# Patient Record
Sex: MALE | Race: WHITE | NOT HISPANIC OR LATINO
[De-identification: names, ages, dates, MRNs, and addresses within clinical notes are randomized per-mention and may not be internally consistent; named-entity substitution may affect disease eponyms.]

---

## 2018-01-04 ENCOUNTER — APPOINTMENT (OUTPATIENT)
Dept: UROLOGY | Facility: CLINIC | Age: 63
End: 2018-01-04

## 2018-01-18 ENCOUNTER — APPOINTMENT (OUTPATIENT)
Dept: UROLOGY | Facility: CLINIC | Age: 63
End: 2018-01-18
Payer: MEDICARE

## 2018-01-18 DIAGNOSIS — I10 ESSENTIAL (PRIMARY) HYPERTENSION: ICD-10-CM

## 2018-01-18 PROCEDURE — 99203 OFFICE O/P NEW LOW 30 MIN: CPT

## 2018-01-22 ENCOUNTER — APPOINTMENT (OUTPATIENT)
Dept: UROLOGY | Facility: CLINIC | Age: 63
End: 2018-01-22
Payer: MEDICARE

## 2018-01-29 ENCOUNTER — LABORATORY RESULT (OUTPATIENT)
Age: 63
End: 2018-01-29

## 2018-01-29 ENCOUNTER — APPOINTMENT (OUTPATIENT)
Dept: UROLOGY | Facility: CLINIC | Age: 63
End: 2018-01-29
Payer: MEDICARE

## 2018-01-29 VITALS
WEIGHT: 166 LBS | RESPIRATION RATE: 18 BRPM | BODY MASS INDEX: 26.06 KG/M2 | HEART RATE: 78 BPM | TEMPERATURE: 98.1 F | DIASTOLIC BLOOD PRESSURE: 86 MMHG | OXYGEN SATURATION: 98 % | SYSTOLIC BLOOD PRESSURE: 150 MMHG | HEIGHT: 67 IN

## 2018-01-29 VITALS — SYSTOLIC BLOOD PRESSURE: 160 MMHG | DIASTOLIC BLOOD PRESSURE: 84 MMHG | RESPIRATION RATE: 18 BRPM | HEART RATE: 88 BPM

## 2018-01-29 PROCEDURE — 55700: CPT

## 2018-01-29 PROCEDURE — 76872 US TRANSRECTAL: CPT

## 2018-01-29 PROCEDURE — 76942 ECHO GUIDE FOR BIOPSY: CPT | Mod: 59

## 2018-01-29 RX ORDER — GENTAMICIN SULFATE 40 MG/ML
40 INJECTION, SOLUTION INTRAMUSCULAR; INTRAVENOUS
Qty: 2 | Refills: 0 | Status: COMPLETED | OUTPATIENT
Start: 2018-01-29 | End: 2018-01-29

## 2018-02-02 ENCOUNTER — APPOINTMENT (OUTPATIENT)
Dept: UROLOGY | Facility: CLINIC | Age: 63
End: 2018-02-02
Payer: MEDICARE

## 2018-02-02 PROCEDURE — 99212 OFFICE O/P EST SF 10 MIN: CPT

## 2018-02-02 RX ORDER — SALINE LAXATIVE 7; 19 G/118ML; G/118ML
7-19 ENEMA RECTAL
Qty: 1 | Refills: 0 | Status: COMPLETED | COMMUNITY
Start: 2018-01-18 | End: 2018-02-02

## 2018-02-02 RX ORDER — OMEPRAZOLE 20 MG/1
20 CAPSULE, DELAYED RELEASE ORAL
Qty: 90 | Refills: 0 | Status: ACTIVE | COMMUNITY
Start: 2018-01-22

## 2018-02-02 RX ORDER — LEVOFLOXACIN 500 MG/1
500 TABLET, FILM COATED ORAL
Qty: 3 | Refills: 0 | Status: COMPLETED | COMMUNITY
Start: 2018-01-18 | End: 2018-02-02

## 2018-02-08 ENCOUNTER — APPOINTMENT (OUTPATIENT)
Dept: NUCLEAR MEDICINE | Facility: CLINIC | Age: 63
End: 2018-02-08
Payer: MEDICARE

## 2018-02-08 ENCOUNTER — APPOINTMENT (OUTPATIENT)
Dept: CT IMAGING | Facility: CLINIC | Age: 63
End: 2018-02-08
Payer: MEDICARE

## 2018-02-08 ENCOUNTER — OUTPATIENT (OUTPATIENT)
Dept: OUTPATIENT SERVICES | Facility: HOSPITAL | Age: 63
LOS: 1 days | End: 2018-02-08

## 2018-02-08 DIAGNOSIS — C61 MALIGNANT NEOPLASM OF PROSTATE: ICD-10-CM

## 2018-02-08 PROCEDURE — 78306 BONE IMAGING WHOLE BODY: CPT | Mod: 26

## 2018-02-08 PROCEDURE — 78999 UNLISTED MISC PX DX NUC MED: CPT | Mod: 26

## 2018-02-08 PROCEDURE — 74178 CT ABD&PLV WO CNTR FLWD CNTR: CPT | Mod: 26

## 2018-02-12 ENCOUNTER — APPOINTMENT (OUTPATIENT)
Dept: UROLOGY | Facility: CLINIC | Age: 63
End: 2018-02-12
Payer: MEDICARE

## 2018-02-12 VITALS
TEMPERATURE: 98.7 F | HEIGHT: 67 IN | WEIGHT: 166 LBS | DIASTOLIC BLOOD PRESSURE: 77 MMHG | SYSTOLIC BLOOD PRESSURE: 117 MMHG | HEART RATE: 70 BPM | OXYGEN SATURATION: 94 % | BODY MASS INDEX: 26.06 KG/M2

## 2018-02-12 DIAGNOSIS — Z77.098 CONTACT WITH AND (SUSPECTED) EXPOSURE TO OTHER HAZARDOUS, CHIEFLY NONMEDICINAL, CHEMICALS: ICD-10-CM

## 2018-02-12 DIAGNOSIS — Z87.891 PERSONAL HISTORY OF NICOTINE DEPENDENCE: ICD-10-CM

## 2018-02-12 DIAGNOSIS — Z83.79 FAMILY HISTORY OF OTHER DISEASES OF THE DIGESTIVE SYSTEM: ICD-10-CM

## 2018-02-12 DIAGNOSIS — Z78.9 OTHER SPECIFIED HEALTH STATUS: ICD-10-CM

## 2018-02-12 DIAGNOSIS — Z82.49 FAMILY HISTORY OF ISCHEMIC HEART DISEASE AND OTHER DISEASES OF THE CIRCULATORY SYSTEM: ICD-10-CM

## 2018-02-12 DIAGNOSIS — Z84.1 FAMILY HISTORY OF DISORDERS OF KIDNEY AND URETER: ICD-10-CM

## 2018-02-12 PROCEDURE — 99214 OFFICE O/P EST MOD 30 MIN: CPT

## 2018-02-16 ENCOUNTER — OUTPATIENT (OUTPATIENT)
Dept: OUTPATIENT SERVICES | Facility: HOSPITAL | Age: 63
LOS: 1 days | Discharge: HOME | End: 2018-02-16

## 2018-02-16 ENCOUNTER — OUTPATIENT (OUTPATIENT)
Dept: OUTPATIENT SERVICES | Facility: HOSPITAL | Age: 63
LOS: 1 days | End: 2018-02-16
Payer: MEDICARE

## 2018-02-16 ENCOUNTER — APPOINTMENT (OUTPATIENT)
Dept: MRI IMAGING | Facility: CLINIC | Age: 63
End: 2018-02-16
Payer: MEDICARE

## 2018-02-16 DIAGNOSIS — C61 MALIGNANT NEOPLASM OF PROSTATE: ICD-10-CM

## 2018-02-16 PROCEDURE — A9585: CPT

## 2018-02-16 PROCEDURE — 72197 MRI PELVIS W/O & W/DYE: CPT

## 2018-02-16 PROCEDURE — 72197 MRI PELVIS W/O & W/DYE: CPT | Mod: 26

## 2018-02-21 ENCOUNTER — APPOINTMENT (OUTPATIENT)
Dept: RADIATION ONCOLOGY | Facility: CLINIC | Age: 63
End: 2018-02-21
Payer: MEDICARE

## 2018-02-21 VITALS
BODY MASS INDEX: 26.68 KG/M2 | DIASTOLIC BLOOD PRESSURE: 89 MMHG | OXYGEN SATURATION: 95 % | WEIGHT: 170 LBS | HEART RATE: 77 BPM | RESPIRATION RATE: 16 BRPM | SYSTOLIC BLOOD PRESSURE: 147 MMHG | TEMPERATURE: 98.4 F | HEIGHT: 67 IN

## 2018-02-21 DIAGNOSIS — R68.89 OTHER GENERAL SYMPTOMS AND SIGNS: ICD-10-CM

## 2018-02-21 PROCEDURE — 99205 OFFICE O/P NEW HI 60 MIN: CPT | Mod: 25

## 2018-02-21 RX ORDER — ENALAPRIL MALEATE 10 MG/1
10 TABLET ORAL
Refills: 0 | Status: ACTIVE | COMMUNITY

## 2018-02-21 RX ORDER — NIFEDIPINE 60 MG/1
60 TABLET, FILM COATED, EXTENDED RELEASE ORAL
Refills: 0 | Status: ACTIVE | COMMUNITY

## 2018-02-28 ENCOUNTER — OUTPATIENT (OUTPATIENT)
Dept: OUTPATIENT SERVICES | Facility: HOSPITAL | Age: 63
LOS: 1 days | Discharge: HOME | End: 2018-02-28
Payer: MEDICARE

## 2018-02-28 VITALS
WEIGHT: 166.01 LBS | DIASTOLIC BLOOD PRESSURE: 94 MMHG | TEMPERATURE: 98 F | OXYGEN SATURATION: 98 % | SYSTOLIC BLOOD PRESSURE: 140 MMHG | HEIGHT: 67 IN | HEART RATE: 59 BPM | RESPIRATION RATE: 16 BRPM

## 2018-02-28 DIAGNOSIS — F41.9 ANXIETY DISORDER, UNSPECIFIED: ICD-10-CM

## 2018-02-28 DIAGNOSIS — G62.9 POLYNEUROPATHY, UNSPECIFIED: ICD-10-CM

## 2018-02-28 DIAGNOSIS — Z96.7 PRESENCE OF OTHER BONE AND TENDON IMPLANTS: Chronic | ICD-10-CM

## 2018-02-28 DIAGNOSIS — C61 MALIGNANT NEOPLASM OF PROSTATE: ICD-10-CM

## 2018-02-28 DIAGNOSIS — E78.5 HYPERLIPIDEMIA, UNSPECIFIED: ICD-10-CM

## 2018-02-28 DIAGNOSIS — Z98.890 OTHER SPECIFIED POSTPROCEDURAL STATES: Chronic | ICD-10-CM

## 2018-02-28 DIAGNOSIS — Z01.818 ENCOUNTER FOR OTHER PREPROCEDURAL EXAMINATION: ICD-10-CM

## 2018-02-28 DIAGNOSIS — N32.89 OTHER SPECIFIED DISORDERS OF BLADDER: ICD-10-CM

## 2018-02-28 DIAGNOSIS — I10 ESSENTIAL (PRIMARY) HYPERTENSION: ICD-10-CM

## 2018-02-28 DIAGNOSIS — K66.0 PERITONEAL ADHESIONS (POSTPROCEDURAL) (POSTINFECTION): ICD-10-CM

## 2018-02-28 DIAGNOSIS — K21.9 GASTRO-ESOPHAGEAL REFLUX DISEASE WITHOUT ESOPHAGITIS: ICD-10-CM

## 2018-02-28 DIAGNOSIS — G89.29 OTHER CHRONIC PAIN: ICD-10-CM

## 2018-02-28 LAB
ABO RH CONFIRMATION: SIGNIFICANT CHANGE UP
ANION GAP SERPL CALC-SCNC: 6 MMOL/L — SIGNIFICANT CHANGE UP (ref 5–17)
APPEARANCE UR: CLEAR — SIGNIFICANT CHANGE UP
APTT BLD: 28.5 SEC — SIGNIFICANT CHANGE UP (ref 27.5–37.4)
BASOPHILS # BLD AUTO: 0.1 K/UL — SIGNIFICANT CHANGE UP (ref 0–0.2)
BASOPHILS NFR BLD AUTO: 1.1 % — SIGNIFICANT CHANGE UP (ref 0–2)
BILIRUB UR-MCNC: NEGATIVE — SIGNIFICANT CHANGE UP
BLD GP AB SCN SERPL QL: SIGNIFICANT CHANGE UP
BUN SERPL-MCNC: 14 MG/DL — SIGNIFICANT CHANGE UP (ref 7–23)
CALCIUM SERPL-MCNC: 9.1 MG/DL — SIGNIFICANT CHANGE UP (ref 8.5–10.1)
CHLORIDE SERPL-SCNC: 102 MMOL/L — SIGNIFICANT CHANGE UP (ref 96–108)
CO2 SERPL-SCNC: 29 MMOL/L — SIGNIFICANT CHANGE UP (ref 22–31)
COLOR SPEC: YELLOW — SIGNIFICANT CHANGE UP
CREAT SERPL-MCNC: 0.95 MG/DL — SIGNIFICANT CHANGE UP (ref 0.5–1.3)
DIFF PNL FLD: NEGATIVE — SIGNIFICANT CHANGE UP
EOSINOPHIL # BLD AUTO: 0.1 K/UL — SIGNIFICANT CHANGE UP (ref 0–0.5)
EOSINOPHIL NFR BLD AUTO: 2.3 % — SIGNIFICANT CHANGE UP (ref 0–6)
GLUCOSE SERPL-MCNC: 112 MG/DL — HIGH (ref 70–99)
GLUCOSE UR QL: NEGATIVE MG/DL — SIGNIFICANT CHANGE UP
HCT VFR BLD CALC: 44.7 % — SIGNIFICANT CHANGE UP (ref 39–50)
HGB BLD-MCNC: 15.9 G/DL — SIGNIFICANT CHANGE UP (ref 13–17)
INR BLD: 1.01 RATIO — SIGNIFICANT CHANGE UP (ref 0.88–1.16)
KETONES UR-MCNC: NEGATIVE — SIGNIFICANT CHANGE UP
LEUKOCYTE ESTERASE UR-ACNC: NEGATIVE — SIGNIFICANT CHANGE UP
LYMPHOCYTES # BLD AUTO: 1.3 K/UL — SIGNIFICANT CHANGE UP (ref 1–3.3)
LYMPHOCYTES # BLD AUTO: 21.6 % — SIGNIFICANT CHANGE UP (ref 13–44)
MCHC RBC-ENTMCNC: 31.4 PG — SIGNIFICANT CHANGE UP (ref 27–34)
MCHC RBC-ENTMCNC: 35.6 GM/DL — SIGNIFICANT CHANGE UP (ref 32–36)
MCV RBC AUTO: 88.1 FL — SIGNIFICANT CHANGE UP (ref 80–100)
MONOCYTES # BLD AUTO: 0.5 K/UL — SIGNIFICANT CHANGE UP (ref 0–0.9)
MONOCYTES NFR BLD AUTO: 8.5 % — SIGNIFICANT CHANGE UP (ref 2–14)
NEUTROPHILS # BLD AUTO: 3.9 K/UL — SIGNIFICANT CHANGE UP (ref 1.8–7.4)
NEUTROPHILS NFR BLD AUTO: 66.4 % — SIGNIFICANT CHANGE UP (ref 43–77)
NITRITE UR-MCNC: NEGATIVE — SIGNIFICANT CHANGE UP
PH UR: 7 — SIGNIFICANT CHANGE UP (ref 5–8)
PLATELET # BLD AUTO: 236 K/UL — SIGNIFICANT CHANGE UP (ref 150–400)
POTASSIUM SERPL-MCNC: 4.4 MMOL/L — SIGNIFICANT CHANGE UP (ref 3.5–5.3)
POTASSIUM SERPL-SCNC: 4.4 MMOL/L — SIGNIFICANT CHANGE UP (ref 3.5–5.3)
PROT UR-MCNC: NEGATIVE MG/DL — SIGNIFICANT CHANGE UP
PROTHROM AB SERPL-ACNC: 10.9 SEC — SIGNIFICANT CHANGE UP (ref 9.8–12.7)
RBC # BLD: 5.07 M/UL — SIGNIFICANT CHANGE UP (ref 4.2–5.8)
RBC # FLD: 11.1 % — SIGNIFICANT CHANGE UP (ref 10.3–14.5)
SODIUM SERPL-SCNC: 137 MMOL/L — SIGNIFICANT CHANGE UP (ref 135–145)
SP GR SPEC: 1 — LOW (ref 1.01–1.02)
TYPE + AB SCN PNL BLD: SIGNIFICANT CHANGE UP
UROBILINOGEN FLD QL: NEGATIVE MG/DL — SIGNIFICANT CHANGE UP
WBC # BLD: 5.8 K/UL — SIGNIFICANT CHANGE UP (ref 3.8–10.5)
WBC # FLD AUTO: 5.8 K/UL — SIGNIFICANT CHANGE UP (ref 3.8–10.5)

## 2018-02-28 PROCEDURE — 93010 ELECTROCARDIOGRAM REPORT: CPT

## 2018-02-28 NOTE — H&P PST ADULT - HISTORY OF PRESENT ILLNESS
62 year old male PMH of HTN, HLD, neuropathy right hand, anxiety, GERD diagnosed with prostate cancer presents to PST for robotic radical prostatectomy

## 2018-02-28 NOTE — H&P PST ADULT - PSH
H/O elbow surgery  2005 with nerve damage post surgery according to pt  History of arthroscopy of right shoulder    History of back surgery  laminectomy 2000  S/P ORIF (open reduction internal fixation) fracture  right heel

## 2018-02-28 NOTE — H&P PST ADULT - PMH
Anxiety    GERD (gastroesophageal reflux disease)    Hypertension    Neuropathy    Prostate cancer    Skin cancer

## 2018-03-01 ENCOUNTER — CLINICAL ADVICE (OUTPATIENT)
Age: 63
End: 2018-03-01

## 2018-03-01 LAB
CULTURE RESULTS: NO GROWTH — SIGNIFICANT CHANGE UP
SPECIMEN SOURCE: SIGNIFICANT CHANGE UP

## 2018-03-05 ENCOUNTER — OUTPATIENT (OUTPATIENT)
Dept: OUTPATIENT SERVICES | Facility: HOSPITAL | Age: 63
LOS: 1 days | Discharge: HOME | End: 2018-03-05
Payer: MEDICARE

## 2018-03-05 ENCOUNTER — RESULT REVIEW (OUTPATIENT)
Age: 63
End: 2018-03-05

## 2018-03-05 DIAGNOSIS — Z98.890 OTHER SPECIFIED POSTPROCEDURAL STATES: Chronic | ICD-10-CM

## 2018-03-05 DIAGNOSIS — Z96.7 PRESENCE OF OTHER BONE AND TENDON IMPLANTS: Chronic | ICD-10-CM

## 2018-03-05 DIAGNOSIS — C61 MALIGNANT NEOPLASM OF PROSTATE: ICD-10-CM

## 2018-03-05 LAB — SURGICAL PATHOLOGY FINAL REPORT - CH: SIGNIFICANT CHANGE UP

## 2018-03-05 PROCEDURE — 88321 CONSLTJ&REPRT SLD PREP ELSWR: CPT

## 2018-03-05 RX ORDER — FAMOTIDINE 10 MG/ML
20 INJECTION INTRAVENOUS ONCE
Qty: 0 | Refills: 0 | Status: COMPLETED | OUTPATIENT
Start: 2018-03-06 | End: 2018-03-06

## 2018-03-05 RX ORDER — OXYCODONE HYDROCHLORIDE 5 MG/1
10 TABLET ORAL ONCE
Qty: 0 | Refills: 0 | Status: DISCONTINUED | OUTPATIENT
Start: 2018-03-06 | End: 2018-03-06

## 2018-03-05 RX ORDER — ACETAMINOPHEN 500 MG
975 TABLET ORAL ONCE
Qty: 0 | Refills: 0 | Status: COMPLETED | OUTPATIENT
Start: 2018-03-06 | End: 2018-03-06

## 2018-03-05 RX ORDER — OXYCODONE HYDROCHLORIDE 5 MG/1
5 TABLET ORAL EVERY 4 HOURS
Qty: 0 | Refills: 0 | Status: DISCONTINUED | OUTPATIENT
Start: 2018-03-06 | End: 2018-03-06

## 2018-03-05 RX ORDER — SODIUM CHLORIDE 9 MG/ML
3 INJECTION INTRAMUSCULAR; INTRAVENOUS; SUBCUTANEOUS EVERY 8 HOURS
Qty: 0 | Refills: 0 | Status: DISCONTINUED | OUTPATIENT
Start: 2018-03-06 | End: 2018-03-08

## 2018-03-06 ENCOUNTER — INPATIENT (INPATIENT)
Facility: HOSPITAL | Age: 63
LOS: 1 days | Discharge: HOME | End: 2018-03-08
Attending: UROLOGY | Admitting: UROLOGY
Payer: MEDICARE

## 2018-03-06 ENCOUNTER — OTHER (OUTPATIENT)
Age: 63
End: 2018-03-06

## 2018-03-06 ENCOUNTER — RESULT REVIEW (OUTPATIENT)
Age: 63
End: 2018-03-06

## 2018-03-06 ENCOUNTER — APPOINTMENT (OUTPATIENT)
Dept: UROLOGY | Facility: HOSPITAL | Age: 63
End: 2018-03-06

## 2018-03-06 VITALS
HEART RATE: 63 BPM | TEMPERATURE: 98 F | HEIGHT: 67 IN | RESPIRATION RATE: 16 BRPM | SYSTOLIC BLOOD PRESSURE: 137 MMHG | DIASTOLIC BLOOD PRESSURE: 85 MMHG | WEIGHT: 169.98 LBS | OXYGEN SATURATION: 98 %

## 2018-03-06 DIAGNOSIS — Z98.890 OTHER SPECIFIED POSTPROCEDURAL STATES: Chronic | ICD-10-CM

## 2018-03-06 DIAGNOSIS — Z96.7 PRESENCE OF OTHER BONE AND TENDON IMPLANTS: Chronic | ICD-10-CM

## 2018-03-06 LAB
ANION GAP SERPL CALC-SCNC: 5 MMOL/L — SIGNIFICANT CHANGE UP (ref 5–17)
BASOPHILS # BLD AUTO: 0 K/UL — SIGNIFICANT CHANGE UP (ref 0–0.2)
BASOPHILS NFR BLD AUTO: 0.2 % — SIGNIFICANT CHANGE UP (ref 0–2)
BUN SERPL-MCNC: 15 MG/DL — SIGNIFICANT CHANGE UP (ref 7–23)
CALCIUM SERPL-MCNC: 8.4 MG/DL — LOW (ref 8.5–10.1)
CHLORIDE SERPL-SCNC: 105 MMOL/L — SIGNIFICANT CHANGE UP (ref 96–108)
CO2 SERPL-SCNC: 28 MMOL/L — SIGNIFICANT CHANGE UP (ref 22–31)
CREAT SERPL-MCNC: 1.13 MG/DL — SIGNIFICANT CHANGE UP (ref 0.5–1.3)
EOSINOPHIL # BLD AUTO: 0 K/UL — SIGNIFICANT CHANGE UP (ref 0–0.5)
EOSINOPHIL NFR BLD AUTO: 0.1 % — SIGNIFICANT CHANGE UP (ref 0–6)
GLUCOSE SERPL-MCNC: 150 MG/DL — HIGH (ref 70–99)
HCT VFR BLD CALC: 41.6 % — SIGNIFICANT CHANGE UP (ref 39–50)
HGB BLD-MCNC: 14.6 G/DL — SIGNIFICANT CHANGE UP (ref 13–17)
LYMPHOCYTES # BLD AUTO: 0.7 K/UL — LOW (ref 1–3.3)
LYMPHOCYTES # BLD AUTO: 5.1 % — LOW (ref 13–44)
MCHC RBC-ENTMCNC: 31.1 PG — SIGNIFICANT CHANGE UP (ref 27–34)
MCHC RBC-ENTMCNC: 35.2 GM/DL — SIGNIFICANT CHANGE UP (ref 32–36)
MCV RBC AUTO: 88.4 FL — SIGNIFICANT CHANGE UP (ref 80–100)
MONOCYTES # BLD AUTO: 0.2 K/UL — SIGNIFICANT CHANGE UP (ref 0–0.9)
MONOCYTES NFR BLD AUTO: 1.4 % — LOW (ref 2–14)
NEUTROPHILS # BLD AUTO: 12.2 K/UL — HIGH (ref 1.8–7.4)
NEUTROPHILS NFR BLD AUTO: 93.2 % — HIGH (ref 43–77)
PLATELET # BLD AUTO: 239 K/UL — SIGNIFICANT CHANGE UP (ref 150–400)
POTASSIUM SERPL-MCNC: 4 MMOL/L — SIGNIFICANT CHANGE UP (ref 3.5–5.3)
POTASSIUM SERPL-SCNC: 4 MMOL/L — SIGNIFICANT CHANGE UP (ref 3.5–5.3)
RBC # BLD: 4.7 M/UL — SIGNIFICANT CHANGE UP (ref 4.2–5.8)
RBC # FLD: 10.6 % — SIGNIFICANT CHANGE UP (ref 10.3–14.5)
SODIUM SERPL-SCNC: 138 MMOL/L — SIGNIFICANT CHANGE UP (ref 135–145)
WBC # BLD: 13.1 K/UL — HIGH (ref 3.8–10.5)
WBC # FLD AUTO: 13.1 K/UL — HIGH (ref 3.8–10.5)

## 2018-03-06 PROCEDURE — 55866 LAPS SURG PRST8ECT RPBIC RAD: CPT

## 2018-03-06 PROCEDURE — 88307 TISSUE EXAM BY PATHOLOGIST: CPT | Mod: 26

## 2018-03-06 PROCEDURE — 38571 LAPAROSCOPY LYMPHADENECTOMY: CPT

## 2018-03-06 PROCEDURE — 88309 TISSUE EXAM BY PATHOLOGIST: CPT | Mod: 26

## 2018-03-06 RX ORDER — HYDROMORPHONE HYDROCHLORIDE 2 MG/ML
0.5 INJECTION INTRAMUSCULAR; INTRAVENOUS; SUBCUTANEOUS
Qty: 0 | Refills: 0 | Status: DISCONTINUED | OUTPATIENT
Start: 2018-03-06 | End: 2018-03-06

## 2018-03-06 RX ORDER — NIFEDIPINE 30 MG
60 TABLET, EXTENDED RELEASE 24 HR ORAL DAILY
Qty: 0 | Refills: 0 | Status: DISCONTINUED | OUTPATIENT
Start: 2018-03-06 | End: 2018-03-08

## 2018-03-06 RX ORDER — NIFEDIPINE 30 MG
1 TABLET, EXTENDED RELEASE 24 HR ORAL
Qty: 0 | Refills: 0 | COMMUNITY

## 2018-03-06 RX ORDER — SODIUM CHLORIDE 9 MG/ML
1000 INJECTION INTRAMUSCULAR; INTRAVENOUS; SUBCUTANEOUS
Qty: 0 | Refills: 0 | Status: DISCONTINUED | OUTPATIENT
Start: 2018-03-06 | End: 2018-03-07

## 2018-03-06 RX ORDER — HEPARIN SODIUM 5000 [USP'U]/ML
5000 INJECTION INTRAVENOUS; SUBCUTANEOUS EVERY 8 HOURS
Qty: 0 | Refills: 0 | Status: DISCONTINUED | OUTPATIENT
Start: 2018-03-06 | End: 2018-03-08

## 2018-03-06 RX ORDER — ONDANSETRON 8 MG/1
4 TABLET, FILM COATED ORAL EVERY 6 HOURS
Qty: 0 | Refills: 0 | Status: DISCONTINUED | OUTPATIENT
Start: 2018-03-06 | End: 2018-03-08

## 2018-03-06 RX ORDER — HYDROMORPHONE HYDROCHLORIDE 2 MG/ML
0.5 INJECTION INTRAMUSCULAR; INTRAVENOUS; SUBCUTANEOUS
Qty: 0 | Refills: 0 | Status: DISCONTINUED | OUTPATIENT
Start: 2018-03-06 | End: 2018-03-08

## 2018-03-06 RX ORDER — HYDROMORPHONE HYDROCHLORIDE 2 MG/ML
1 INJECTION INTRAMUSCULAR; INTRAVENOUS; SUBCUTANEOUS EVERY 4 HOURS
Qty: 0 | Refills: 0 | Status: DISCONTINUED | OUTPATIENT
Start: 2018-03-06 | End: 2018-03-07

## 2018-03-06 RX ORDER — OMEPRAZOLE 10 MG/1
1 CAPSULE, DELAYED RELEASE ORAL
Qty: 0 | Refills: 0 | COMMUNITY

## 2018-03-06 RX ORDER — OXYCODONE HYDROCHLORIDE 5 MG/1
10 TABLET ORAL EVERY 6 HOURS
Qty: 0 | Refills: 0 | Status: DISCONTINUED | OUTPATIENT
Start: 2018-03-06 | End: 2018-03-08

## 2018-03-06 RX ORDER — CEFAZOLIN SODIUM 1 G
2000 VIAL (EA) INJECTION EVERY 8 HOURS
Qty: 0 | Refills: 0 | Status: COMPLETED | OUTPATIENT
Start: 2018-03-06 | End: 2018-03-07

## 2018-03-06 RX ORDER — ONDANSETRON 8 MG/1
4 TABLET, FILM COATED ORAL ONCE
Qty: 0 | Refills: 0 | Status: DISCONTINUED | OUTPATIENT
Start: 2018-03-06 | End: 2018-03-06

## 2018-03-06 RX ORDER — SODIUM CHLORIDE 9 MG/ML
1000 INJECTION, SOLUTION INTRAVENOUS
Qty: 0 | Refills: 0 | Status: DISCONTINUED | OUTPATIENT
Start: 2018-03-06 | End: 2018-03-06

## 2018-03-06 RX ORDER — PANTOPRAZOLE SODIUM 20 MG/1
40 TABLET, DELAYED RELEASE ORAL
Qty: 0 | Refills: 0 | Status: DISCONTINUED | OUTPATIENT
Start: 2018-03-06 | End: 2018-03-08

## 2018-03-06 RX ORDER — OXYCODONE HYDROCHLORIDE 5 MG/1
5 TABLET ORAL ONCE
Qty: 0 | Refills: 0 | Status: DISCONTINUED | OUTPATIENT
Start: 2018-03-06 | End: 2018-03-06

## 2018-03-06 RX ORDER — GABAPENTIN 400 MG/1
3 CAPSULE ORAL
Qty: 0 | Refills: 0 | COMMUNITY

## 2018-03-06 RX ORDER — OXYCODONE HYDROCHLORIDE 5 MG/1
1 TABLET ORAL
Qty: 0 | Refills: 0 | COMMUNITY

## 2018-03-06 RX ORDER — ALPRAZOLAM 0.25 MG
1 TABLET ORAL AT BEDTIME
Qty: 0 | Refills: 0 | Status: DISCONTINUED | OUTPATIENT
Start: 2018-03-06 | End: 2018-03-08

## 2018-03-06 RX ORDER — GABAPENTIN 400 MG/1
300 CAPSULE ORAL DAILY
Qty: 0 | Refills: 0 | Status: DISCONTINUED | OUTPATIENT
Start: 2018-03-06 | End: 2018-03-08

## 2018-03-06 RX ORDER — FENTANYL CITRATE 50 UG/ML
50 INJECTION INTRAVENOUS
Qty: 0 | Refills: 0 | Status: DISCONTINUED | OUTPATIENT
Start: 2018-03-06 | End: 2018-03-06

## 2018-03-06 RX ADMIN — FAMOTIDINE 20 MILLIGRAM(S): 10 INJECTION INTRAVENOUS at 06:53

## 2018-03-06 RX ADMIN — GABAPENTIN 300 MILLIGRAM(S): 400 CAPSULE ORAL at 16:10

## 2018-03-06 RX ADMIN — HYDROMORPHONE HYDROCHLORIDE 0.5 MILLIGRAM(S): 2 INJECTION INTRAMUSCULAR; INTRAVENOUS; SUBCUTANEOUS at 11:46

## 2018-03-06 RX ADMIN — OXYCODONE HYDROCHLORIDE 10 MILLIGRAM(S): 5 TABLET ORAL at 16:13

## 2018-03-06 RX ADMIN — HYDROMORPHONE HYDROCHLORIDE 0.5 MILLIGRAM(S): 2 INJECTION INTRAMUSCULAR; INTRAVENOUS; SUBCUTANEOUS at 12:30

## 2018-03-06 RX ADMIN — HYDROMORPHONE HYDROCHLORIDE 0.5 MILLIGRAM(S): 2 INJECTION INTRAMUSCULAR; INTRAVENOUS; SUBCUTANEOUS at 12:31

## 2018-03-06 RX ADMIN — Medication 975 MILLIGRAM(S): at 06:53

## 2018-03-06 RX ADMIN — HYDROMORPHONE HYDROCHLORIDE 0.5 MILLIGRAM(S): 2 INJECTION INTRAMUSCULAR; INTRAVENOUS; SUBCUTANEOUS at 12:23

## 2018-03-06 RX ADMIN — Medication 60 MILLIGRAM(S): at 17:29

## 2018-03-06 RX ADMIN — SODIUM CHLORIDE 125 MILLILITER(S): 9 INJECTION INTRAMUSCULAR; INTRAVENOUS; SUBCUTANEOUS at 13:21

## 2018-03-06 RX ADMIN — HEPARIN SODIUM 5000 UNIT(S): 5000 INJECTION INTRAVENOUS; SUBCUTANEOUS at 22:13

## 2018-03-06 RX ADMIN — HYDROMORPHONE HYDROCHLORIDE 0.5 MILLIGRAM(S): 2 INJECTION INTRAMUSCULAR; INTRAVENOUS; SUBCUTANEOUS at 11:56

## 2018-03-06 RX ADMIN — SODIUM CHLORIDE 125 MILLILITER(S): 9 INJECTION INTRAMUSCULAR; INTRAVENOUS; SUBCUTANEOUS at 20:02

## 2018-03-06 RX ADMIN — Medication 10 MILLIGRAM(S): at 17:29

## 2018-03-06 RX ADMIN — OXYCODONE HYDROCHLORIDE 10 MILLIGRAM(S): 5 TABLET ORAL at 06:53

## 2018-03-06 RX ADMIN — HYDROMORPHONE HYDROCHLORIDE 1 MILLIGRAM(S): 2 INJECTION INTRAMUSCULAR; INTRAVENOUS; SUBCUTANEOUS at 20:15

## 2018-03-06 RX ADMIN — OXYCODONE HYDROCHLORIDE 10 MILLIGRAM(S): 5 TABLET ORAL at 17:13

## 2018-03-06 RX ADMIN — HYDROMORPHONE HYDROCHLORIDE 0.5 MILLIGRAM(S): 2 INJECTION INTRAMUSCULAR; INTRAVENOUS; SUBCUTANEOUS at 11:36

## 2018-03-06 RX ADMIN — HYDROMORPHONE HYDROCHLORIDE 0.5 MILLIGRAM(S): 2 INJECTION INTRAMUSCULAR; INTRAVENOUS; SUBCUTANEOUS at 13:05

## 2018-03-06 RX ADMIN — OXYCODONE HYDROCHLORIDE 5 MILLIGRAM(S): 5 TABLET ORAL at 12:07

## 2018-03-06 RX ADMIN — Medication 100 MILLIGRAM(S): at 21:13

## 2018-03-06 RX ADMIN — HYDROMORPHONE HYDROCHLORIDE 1 MILLIGRAM(S): 2 INJECTION INTRAMUSCULAR; INTRAVENOUS; SUBCUTANEOUS at 20:00

## 2018-03-06 RX ADMIN — HYDROMORPHONE HYDROCHLORIDE 0.5 MILLIGRAM(S): 2 INJECTION INTRAMUSCULAR; INTRAVENOUS; SUBCUTANEOUS at 11:26

## 2018-03-06 RX ADMIN — OXYCODONE HYDROCHLORIDE 5 MILLIGRAM(S): 5 TABLET ORAL at 11:53

## 2018-03-06 RX ADMIN — SODIUM CHLORIDE 100 MILLILITER(S): 9 INJECTION, SOLUTION INTRAVENOUS at 11:29

## 2018-03-06 RX ADMIN — HYDROMORPHONE HYDROCHLORIDE 0.5 MILLIGRAM(S): 2 INJECTION INTRAMUSCULAR; INTRAVENOUS; SUBCUTANEOUS at 12:10

## 2018-03-06 RX ADMIN — HYDROMORPHONE HYDROCHLORIDE 0.5 MILLIGRAM(S): 2 INJECTION INTRAMUSCULAR; INTRAVENOUS; SUBCUTANEOUS at 12:52

## 2018-03-06 RX ADMIN — SODIUM CHLORIDE 3 MILLILITER(S): 9 INJECTION INTRAMUSCULAR; INTRAVENOUS; SUBCUTANEOUS at 22:16

## 2018-03-06 NOTE — BRIEF OPERATIVE NOTE - PROCEDURE
<<-----Click on this checkbox to enter Procedure Radical prostatectomy  03/06/2018  Robotic assisted laparoscopic radical prostatectomy with b/l inguinal lymph node dissection  Active  [email or web address]

## 2018-03-06 NOTE — PROGRESS NOTE ADULT - SUBJECTIVE AND OBJECTIVE BOX
Subjective: 62y y/o M seen at bedside for post-op check. Reports moderate lower abdominal pain (just received oxycodone IR) but otherwise no complaints. (+) PO intake fluids, dinner tray at bedside (-) flatus/BM (-) OOB. Denies N/V/CP/SOB/F/C.     STATUS POST:  robot assisted radical prostatectomy with b/l inguinal lymph node dissection    POST OPERATIVE DAY #: 0     MALIGNANT NEOPLASM OF PROSTATE  No pertinent family history in first degree relatives  Handoff  MEWS Score  Skin cancer  Anxiety  Neuropathy  GERD (gastroesophageal reflux disease)  Hypertension  Prostate cancer  Prostate cancer  Prostate cancer  Radical prostatectomy  History of arthroscopy of right shoulder  History of back surgery  S/P ORIF (open reduction internal fixation) fracture  H/O elbow surgery      MEDICATIONS  (STANDING):  enalapril 10 milliGRAM(s) Oral daily  gabapentin 300 milliGRAM(s) Oral daily  heparin  Injectable 5000 Unit(s) SubCutaneous every 8 hours  NIFEdipine XL 60 milliGRAM(s) Oral daily  pantoprazole    Tablet 40 milliGRAM(s) Oral before breakfast  sodium chloride 0.9%. 1000 milliLiter(s) (125 mL/Hr) IV Continuous <Continuous>    MEDICATIONS  (PRN):  ALPRAZolam 1 milliGRAM(s) Oral at bedtime PRN for anxiety  HYDROmorphone  Injectable 1 milliGRAM(s) IV Push every 4 hours PRN Severe Pain  HYDROmorphone  Injectable 0.5 milliGRAM(s) IV Push every 10 minutes PRN Severe Pain (7 - 10)  ondansetron Injectable 4 milliGRAM(s) IV Push every 6 hours PRN Nausea  oxyCODONE    IR 10 milliGRAM(s) Oral every 6 hours PRN Moderate Pain      Vital Signs Last 24 Hrs  T(C): 36.5 (06 Mar 2018 13:33), Max: 36.5 (06 Mar 2018 13:33)  T(F): 97.7 (06 Mar 2018 13:33), Max: 97.7 (06 Mar 2018 13:33)  HR: 73 (06 Mar 2018 13:33) (60 - 88)  BP: 153/88 (06 Mar 2018 13:33) (136/68 - 166/85)  BP(mean): --  RR: 18 (06 Mar 2018 13:33) (12 - 18)  SpO2: 97% (06 Mar 2018 13:33) (97% - 100%)    Physical Exam:    Constitutional: NAD  HEENT: PERRL, EOMI, mucous membranes moist  Neck: No JVD  Respiratory: CTAB, no r/r/w, no accessory muscle use  Cardiovascular: RRR, S1, S2, distal pulses intact in all four extremities  Gastrointestinal: multiple abdominal incisions c/d/i with dermabond without erythema/ecchymosis/swelling/drainage, NAREN dressing c/d/i, NAREN with sanguinous drainage, BS+ hypoactive in all four quadrants, ND/grossly NT, soft, no rigidity/guarding/rebound tenderness, cunningham in place with clear yellow drainage (bloody drainage in reservoir)  Extremities: no pedal edema/calf tenderness/swelling b/l, venodynes in place  Neurological: A&O x 3; without gross deficit  Musculoskeletal: motor and sensory grossly intact in all four extremities    LABS:                        14.6   13.1  )-----------( 239      ( 06 Mar 2018 12:27 )             41.6     03-06    138  |  105  |  15  ----------------------------<  150<H>  4.0   |  28  |  1.13    Ca    8.4<L>      06 Mar 2018 12:27    I&O's Detail    06 Mar 2018 07:01  -  06 Mar 2018 16:40  --------------------------------------------------------  IN:    Other: 1880 mL  Total IN: 1880 mL    OUT:    Bulb: 80 mL    Indwelling Catheter - Urethral: 40 mL + 200 mL in bag  Total OUT: 320 mL    Total NET: 1560 mL    A/P: 62y y/o Male POD#0 s/p robot assisted radical prostatectomy with b/l inguinal lymph node dissection today. Pt doing well post-operatively  - VSS, CBC/BMP changes expected post-op, H/H stable, adequate UO, expected NAREN output  - Pt to stay overnight, repeat AM labs, and AM follow-up to evaluate for possible discharge  - Pt OOB to chair this PM, OOB in AM  - Continue pain management, IVF, advance diet as tolerated, DVT ppx with Heparin/SCDs, IS, Ancef x 2 doses post-op, NAREN drain to self-suction, cunningham to drainage, I&Os q6h

## 2018-03-07 LAB
ANION GAP SERPL CALC-SCNC: 7 MMOL/L — SIGNIFICANT CHANGE UP (ref 5–17)
BASOPHILS # BLD AUTO: 0.03 K/UL — SIGNIFICANT CHANGE UP (ref 0–0.2)
BASOPHILS NFR BLD AUTO: 0.3 % — SIGNIFICANT CHANGE UP (ref 0–2)
BUN SERPL-MCNC: 11 MG/DL — SIGNIFICANT CHANGE UP (ref 7–23)
CALCIUM SERPL-MCNC: 8.2 MG/DL — LOW (ref 8.5–10.1)
CHLORIDE SERPL-SCNC: 106 MMOL/L — SIGNIFICANT CHANGE UP (ref 96–108)
CO2 SERPL-SCNC: 26 MMOL/L — SIGNIFICANT CHANGE UP (ref 22–31)
CREAT SERPL-MCNC: 1.06 MG/DL — SIGNIFICANT CHANGE UP (ref 0.5–1.3)
EOSINOPHIL # BLD AUTO: 0.02 K/UL — SIGNIFICANT CHANGE UP (ref 0–0.5)
EOSINOPHIL NFR BLD AUTO: 0.2 % — SIGNIFICANT CHANGE UP (ref 0–6)
GLUCOSE SERPL-MCNC: 121 MG/DL — HIGH (ref 70–99)
HCT VFR BLD CALC: 38.4 % — LOW (ref 39–50)
HGB BLD-MCNC: 13.8 G/DL — SIGNIFICANT CHANGE UP (ref 13–17)
IMM GRANULOCYTES NFR BLD AUTO: 0.4 % — SIGNIFICANT CHANGE UP (ref 0–1.5)
LYMPHOCYTES # BLD AUTO: 1.63 K/UL — SIGNIFICANT CHANGE UP (ref 1–3.3)
LYMPHOCYTES # BLD AUTO: 14.8 % — SIGNIFICANT CHANGE UP (ref 13–44)
MCHC RBC-ENTMCNC: 31.6 PG — SIGNIFICANT CHANGE UP (ref 27–34)
MCHC RBC-ENTMCNC: 35.9 GM/DL — SIGNIFICANT CHANGE UP (ref 32–36)
MCV RBC AUTO: 87.9 FL — SIGNIFICANT CHANGE UP (ref 80–100)
MONOCYTES # BLD AUTO: 0.99 K/UL — HIGH (ref 0–0.9)
MONOCYTES NFR BLD AUTO: 9 % — SIGNIFICANT CHANGE UP (ref 2–14)
NEUTROPHILS # BLD AUTO: 8.33 K/UL — HIGH (ref 1.8–7.4)
NEUTROPHILS NFR BLD AUTO: 75.3 % — SIGNIFICANT CHANGE UP (ref 43–77)
NRBC # BLD: 0 /100 WBCS — SIGNIFICANT CHANGE UP (ref 0–0)
PLATELET # BLD AUTO: 227 K/UL — SIGNIFICANT CHANGE UP (ref 150–400)
POTASSIUM SERPL-MCNC: 3.7 MMOL/L — SIGNIFICANT CHANGE UP (ref 3.5–5.3)
POTASSIUM SERPL-SCNC: 3.7 MMOL/L — SIGNIFICANT CHANGE UP (ref 3.5–5.3)
RBC # BLD: 4.37 M/UL — SIGNIFICANT CHANGE UP (ref 4.2–5.8)
RBC # FLD: 11.8 % — SIGNIFICANT CHANGE UP (ref 10.3–14.5)
SODIUM SERPL-SCNC: 139 MMOL/L — SIGNIFICANT CHANGE UP (ref 135–145)
WBC # BLD: 11.04 K/UL — HIGH (ref 3.8–10.5)
WBC # FLD AUTO: 11.04 K/UL — HIGH (ref 3.8–10.5)

## 2018-03-07 RX ORDER — BENZOCAINE AND MENTHOL 5; 1 G/100ML; G/100ML
1 LIQUID ORAL
Qty: 0 | Refills: 0 | Status: DISCONTINUED | OUTPATIENT
Start: 2018-03-07 | End: 2018-03-08

## 2018-03-07 RX ORDER — OXYBUTYNIN CHLORIDE 5 MG
5 TABLET ORAL EVERY 8 HOURS
Qty: 0 | Refills: 0 | Status: DISCONTINUED | OUTPATIENT
Start: 2018-03-07 | End: 2018-03-08

## 2018-03-07 RX ORDER — SODIUM CHLORIDE 0.65 %
2 AEROSOL, SPRAY (ML) NASAL
Qty: 0 | Refills: 0 | Status: DISCONTINUED | OUTPATIENT
Start: 2018-03-07 | End: 2018-03-08

## 2018-03-07 RX ORDER — KETOROLAC TROMETHAMINE 30 MG/ML
30 SYRINGE (ML) INJECTION EVERY 8 HOURS
Qty: 0 | Refills: 0 | Status: DISCONTINUED | OUTPATIENT
Start: 2018-03-07 | End: 2018-03-08

## 2018-03-07 RX ORDER — HYDROMORPHONE HYDROCHLORIDE 2 MG/ML
0.5 INJECTION INTRAMUSCULAR; INTRAVENOUS; SUBCUTANEOUS ONCE
Qty: 0 | Refills: 0 | Status: DISCONTINUED | OUTPATIENT
Start: 2018-03-07 | End: 2018-03-08

## 2018-03-07 RX ORDER — HYDROMORPHONE HYDROCHLORIDE 2 MG/ML
1 INJECTION INTRAMUSCULAR; INTRAVENOUS; SUBCUTANEOUS
Qty: 0 | Refills: 0 | Status: DISCONTINUED | OUTPATIENT
Start: 2018-03-07 | End: 2018-03-08

## 2018-03-07 RX ORDER — DOCUSATE SODIUM 100 MG
100 CAPSULE ORAL THREE TIMES A DAY
Qty: 0 | Refills: 0 | Status: DISCONTINUED | OUTPATIENT
Start: 2018-03-07 | End: 2018-03-08

## 2018-03-07 RX ORDER — SENNA PLUS 8.6 MG/1
2 TABLET ORAL AT BEDTIME
Qty: 0 | Refills: 0 | Status: DISCONTINUED | OUTPATIENT
Start: 2018-03-07 | End: 2018-03-08

## 2018-03-07 RX ADMIN — Medication 30 MILLIGRAM(S): at 21:02

## 2018-03-07 RX ADMIN — SODIUM CHLORIDE 125 MILLILITER(S): 9 INJECTION INTRAMUSCULAR; INTRAVENOUS; SUBCUTANEOUS at 05:23

## 2018-03-07 RX ADMIN — SODIUM CHLORIDE 3 MILLILITER(S): 9 INJECTION INTRAMUSCULAR; INTRAVENOUS; SUBCUTANEOUS at 15:22

## 2018-03-07 RX ADMIN — Medication 10 MILLIGRAM(S): at 05:31

## 2018-03-07 RX ADMIN — OXYCODONE HYDROCHLORIDE 10 MILLIGRAM(S): 5 TABLET ORAL at 02:43

## 2018-03-07 RX ADMIN — Medication 100 MILLIGRAM(S): at 15:39

## 2018-03-07 RX ADMIN — Medication 5 MILLIGRAM(S): at 21:03

## 2018-03-07 RX ADMIN — PANTOPRAZOLE SODIUM 40 MILLIGRAM(S): 20 TABLET, DELAYED RELEASE ORAL at 07:39

## 2018-03-07 RX ADMIN — SODIUM CHLORIDE 3 MILLILITER(S): 9 INJECTION INTRAMUSCULAR; INTRAVENOUS; SUBCUTANEOUS at 21:03

## 2018-03-07 RX ADMIN — HYDROMORPHONE HYDROCHLORIDE 1 MILLIGRAM(S): 2 INJECTION INTRAMUSCULAR; INTRAVENOUS; SUBCUTANEOUS at 09:36

## 2018-03-07 RX ADMIN — BENZOCAINE AND MENTHOL 1 LOZENGE: 5; 1 LIQUID ORAL at 05:30

## 2018-03-07 RX ADMIN — Medication 30 MILLIGRAM(S): at 21:17

## 2018-03-07 RX ADMIN — HYDROMORPHONE HYDROCHLORIDE 1 MILLIGRAM(S): 2 INJECTION INTRAMUSCULAR; INTRAVENOUS; SUBCUTANEOUS at 02:56

## 2018-03-07 RX ADMIN — GABAPENTIN 300 MILLIGRAM(S): 400 CAPSULE ORAL at 12:14

## 2018-03-07 RX ADMIN — HYDROMORPHONE HYDROCHLORIDE 1 MILLIGRAM(S): 2 INJECTION INTRAMUSCULAR; INTRAVENOUS; SUBCUTANEOUS at 06:48

## 2018-03-07 RX ADMIN — HYDROMORPHONE HYDROCHLORIDE 1 MILLIGRAM(S): 2 INJECTION INTRAMUSCULAR; INTRAVENOUS; SUBCUTANEOUS at 09:51

## 2018-03-07 RX ADMIN — Medication 30 MILLIGRAM(S): at 14:15

## 2018-03-07 RX ADMIN — HEPARIN SODIUM 5000 UNIT(S): 5000 INJECTION INTRAVENOUS; SUBCUTANEOUS at 05:26

## 2018-03-07 RX ADMIN — HEPARIN SODIUM 5000 UNIT(S): 5000 INJECTION INTRAVENOUS; SUBCUTANEOUS at 13:17

## 2018-03-07 RX ADMIN — Medication 5 MILLIGRAM(S): at 16:45

## 2018-03-07 RX ADMIN — Medication 2 SPRAY(S): at 05:29

## 2018-03-07 RX ADMIN — SODIUM CHLORIDE 3 MILLILITER(S): 9 INJECTION INTRAMUSCULAR; INTRAVENOUS; SUBCUTANEOUS at 05:33

## 2018-03-07 RX ADMIN — SENNA PLUS 2 TABLET(S): 8.6 TABLET ORAL at 21:02

## 2018-03-07 RX ADMIN — Medication 30 MILLIGRAM(S): at 13:17

## 2018-03-07 RX ADMIN — Medication 60 MILLIGRAM(S): at 05:31

## 2018-03-07 RX ADMIN — Medication 100 MILLIGRAM(S): at 05:25

## 2018-03-07 RX ADMIN — HEPARIN SODIUM 5000 UNIT(S): 5000 INJECTION INTRAVENOUS; SUBCUTANEOUS at 21:01

## 2018-03-07 RX ADMIN — HYDROMORPHONE HYDROCHLORIDE 1 MILLIGRAM(S): 2 INJECTION INTRAMUSCULAR; INTRAVENOUS; SUBCUTANEOUS at 03:11

## 2018-03-07 RX ADMIN — Medication 5 MILLIGRAM(S): at 10:42

## 2018-03-07 NOTE — PROGRESS NOTE ADULT - SUBJECTIVE AND OBJECTIVE BOX
Pt woke up and coughed up mucus  that was in his upper airway. he had recently eaten a large quantity of ice cream and also c/o nasal dryness/post nasal drip. Coughing aggravated his Abd, dilaudid given with relief. no N/V    Heart: [id number]    Lungs: CTA B/L    Abd: ND, +BS, Abd incisions clean/dry with dermabond, NAREN in place with min drainage    A/P: nasal congestion with post nasal drip. cleared throat of mucus, now comfortable         [place] saline spray to B/L nostrils PRN          encourage PO fluids, incentive spirometer          cepacol lozenge PRN

## 2018-03-07 NOTE — PROGRESS NOTE ADULT - SUBJECTIVE AND OBJECTIVE BOX
Status Post:  Robotic Radical Prostatectomy    Post Operative Day #: 1    SUBJECTIVE:    Flatus: N             Bowel Movement: N  Pain (0-10): 10           Pain Control Adequate: N  Nausea: N          Vomiting: N  Diarrhea/Constipation?  N  Chest Pain: N   SOB:  N    Patient complaining of sudden onset suprapubic pain-- sharp 10/10.  Began about an hour ago, was relieved with analgesics and now has returned.  Denies N/V, D/C, F/C, chest pain, dyspnea, SOB.    MEDICATIONS  (STANDING):  enalapril 10 milliGRAM(s) Oral daily  gabapentin 300 milliGRAM(s) Oral daily  heparin  Injectable 5000 Unit(s) SubCutaneous every 8 hours  HYDROmorphone  Injectable 0.5 milliGRAM(s) IV Push once  NIFEdipine XL 60 milliGRAM(s) Oral daily  oxybutynin 5 milliGRAM(s) Oral every 8 hours  pantoprazole    Tablet 40 milliGRAM(s) Oral before breakfast  sodium chloride 0.9% lock flush 3 milliLiter(s) IV Push every 8 hours  sodium chloride 0.9%. 1000 milliLiter(s) (125 mL/Hr) IV Continuous <Continuous>    MEDICATIONS  (PRN):  ALPRAZolam 1 milliGRAM(s) Oral at bedtime PRN for anxiety  benzocaine 15 mG/menthol 3.6 mG Lozenge 1 Lozenge Oral every 3 hours PRN dry/sore throat  HYDROmorphone  Injectable 0.5 milliGRAM(s) IV Push every 10 minutes PRN Severe Pain (7 - 10)  HYDROmorphone  Injectable 1 milliGRAM(s) IV Push every 3 hours PRN Severe Pain (7 - 10)  ondansetron Injectable 4 milliGRAM(s) IV Push every 6 hours PRN Nausea  oxyCODONE    IR 10 milliGRAM(s) Oral every 6 hours PRN Moderate Pain  sodium chloride 0.65% Nasal 2 Spray(s) Both Nostrils every 3 hours PRN Nasal Congestion      OBJECTIVE:  Vital Signs Last 24 Hrs  T(C): 36.7 (07 Mar 2018 09:31), Max: 37.4 (07 Mar 2018 04:37)  T(F): 98.1 (07 Mar 2018 09:31), Max: 99.4 (07 Mar 2018 04:37)  HR: 98 (07 Mar 2018 09:31) (60 - 105)  BP: 152/76 (07 Mar 2018 09:31) (129/86 - 166/85)  BP(mean): --  RR: 95 (07 Mar 2018 09:31) (12 - 95)  SpO2: 94% (07 Mar 2018 04:37) (94% - 100%)  I&O's Detail    06 Mar 2018 07:01  -  07 Mar 2018 07:00  --------------------------------------------------------  IN:    Other: 1880 mL    sodium chloride 0.9%.: 2000 mL  Total IN: 3880 mL    OUT:    Bulb: 180 mL    Indwelling Catheter - Urethral: 2840 mL  Total OUT: 3020 mL    Total NET: 860 mL          CBC Full  -  ( 07 Mar 2018 05:54 )  WBC Count : 11.04 K/uL  Hemoglobin : 13.8 g/dL  Hematocrit : 38.4 %  Platelet Count - Automated : 227 K/uL  Mean Cell Volume : 87.9 fl  Mean Cell Hemoglobin : 31.6 pg  Mean Cell Hemoglobin Concentration : 35.9 gm/dL  Auto Neutrophil # : 8.33 K/uL  Auto Lymphocyte # : 1.63 K/uL  Auto Monocyte # : 0.99 K/uL  Auto Eosinophil # : 0.02 K/uL  Auto Basophil # : 0.03 K/uL  Auto Neutrophil % : 75.3 %  Auto Lymphocyte % : 14.8 %  Auto Monocyte % : 9.0 %  Auto Eosinophil % : 0.2 %  Auto Basophil % : 0.3 %  CBC Full  -  ( 06 Mar 2018 12:27 )  WBC Count : 13.1 K/uL  Hemoglobin : 14.6 g/dL  Hematocrit : 41.6 %  Platelet Count - Automated : 239 K/uL  Mean Cell Volume : 88.4 fl  Mean Cell Hemoglobin : 31.1 pg  Mean Cell Hemoglobin Concentration : 35.2 gm/dL  Auto Neutrophil # : 12.2 K/uL  Auto Lymphocyte # : 0.7 K/uL  Auto Monocyte # : 0.2 K/uL  Auto Eosinophil # : 0.0 K/uL  Auto Basophil # : 0.0 K/uL  Auto Neutrophil % : 93.2 %  Auto Lymphocyte % : 5.1 %  Auto Monocyte % : 1.4 %  Auto Eosinophil % : 0.1 %  Auto Basophil % : 0.2 %    07 Mar 2018 05:54    139    |  106    |  11     ----------------------------<  121    3.7     |  26     |  1.06   06 Mar 2018 12:27    138    |  105    |  15     ----------------------------<  150    4.0     |  28     |  1.13     Ca    8.2        07 Mar 2018 05:54  Ca    8.4        06 Mar 2018 12:27        PHYSICAL EXAM:      Constitutional: Appears in severe pain/ uncomfortable, unable to lie flat    Respiratory: CTA B/L.  No W/R/R    Cardiovascular: S1 and S2, no murmur    Gastrointestinal: abdomen nondistended, incision sites C/D/I with Dermabond, NAREN insertion site clean and dry.  NAREN with serosanguinous outputs    Genitourinary:  No scrotal edema.  Cunningham catheter in place.    Extremities: No C/C/E    Neurological: A&Ox3    Skin: Warm and dry, intact    Musculoskeletal:  FROM x 4      Assessment/Plan: POD # 1 s/p Robotic Radical Nephrectomy with intermittent severe suprapubic pain c/w bladder spasms    -- D/W Dr. Mckenna  -- Continue analgesics  -- Start Ditropan 5mg PO every 8 hours  -- Cunningham catheter advanced one inch  -- Maintain cunningham catheter to gravity  -- DVT prophylaxis  -- observation  -- am labs 3/8/18 Status Post:  Robotic Radical Prostatectomy    Post Operative Day #: 1    SUBJECTIVE:    Flatus: N             Bowel Movement: N  Pain (0-10): 10           Pain Control Adequate: N  Nausea: N          Vomiting: N  Diarrhea/Constipation?  N  Chest Pain: N   SOB:  N    Patient complaining of sudden onset suprapubic pain-- sharp 10/10.  Began about an hour ago, was relieved with analgesics and now has returned.  Denies N/V, D/C, F/C, chest pain, dyspnea, SOB.    MEDICATIONS  (STANDING):  enalapril 10 milliGRAM(s) Oral daily  gabapentin 300 milliGRAM(s) Oral daily  heparin  Injectable 5000 Unit(s) SubCutaneous every 8 hours  HYDROmorphone  Injectable 0.5 milliGRAM(s) IV Push once  NIFEdipine XL 60 milliGRAM(s) Oral daily  oxybutynin 5 milliGRAM(s) Oral every 8 hours  pantoprazole    Tablet 40 milliGRAM(s) Oral before breakfast  sodium chloride 0.9% lock flush 3 milliLiter(s) IV Push every 8 hours  sodium chloride 0.9%. 1000 milliLiter(s) (125 mL/Hr) IV Continuous <Continuous>    MEDICATIONS  (PRN):  ALPRAZolam 1 milliGRAM(s) Oral at bedtime PRN for anxiety  benzocaine 15 mG/menthol 3.6 mG Lozenge 1 Lozenge Oral every 3 hours PRN dry/sore throat  HYDROmorphone  Injectable 0.5 milliGRAM(s) IV Push every 10 minutes PRN Severe Pain (7 - 10)  HYDROmorphone  Injectable 1 milliGRAM(s) IV Push every 3 hours PRN Severe Pain (7 - 10)  ondansetron Injectable 4 milliGRAM(s) IV Push every 6 hours PRN Nausea  oxyCODONE    IR 10 milliGRAM(s) Oral every 6 hours PRN Moderate Pain  sodium chloride 0.65% Nasal 2 Spray(s) Both Nostrils every 3 hours PRN Nasal Congestion      OBJECTIVE:  Vital Signs Last 24 Hrs  T(C): 36.7 (07 Mar 2018 09:31), Max: 37.4 (07 Mar 2018 04:37)  T(F): 98.1 (07 Mar 2018 09:31), Max: 99.4 (07 Mar 2018 04:37)  HR: 98 (07 Mar 2018 09:31) (60 - 105)  BP: 152/76 (07 Mar 2018 09:31) (129/86 - 166/85)  BP(mean): --  RR: 95 (07 Mar 2018 09:31) (12 - 95)  SpO2: 94% (07 Mar 2018 04:37) (94% - 100%)  I&O's Detail    06 Mar 2018 07:01  -  07 Mar 2018 07:00  --------------------------------------------------------  IN:    Other: 1880 mL    sodium chloride 0.9%.: 2000 mL  Total IN: 3880 mL    OUT:    Bulb: 180 mL    Indwelling Catheter - Urethral: 2840 mL  Total OUT: 3020 mL    Total NET: 860 mL          CBC Full  -  ( 07 Mar 2018 05:54 )  WBC Count : 11.04 K/uL  Hemoglobin : 13.8 g/dL  Hematocrit : 38.4 %  Platelet Count - Automated : 227 K/uL  Mean Cell Volume : 87.9 fl  Mean Cell Hemoglobin : 31.6 pg  Mean Cell Hemoglobin Concentration : 35.9 gm/dL  Auto Neutrophil # : 8.33 K/uL  Auto Lymphocyte # : 1.63 K/uL  Auto Monocyte # : 0.99 K/uL  Auto Eosinophil # : 0.02 K/uL  Auto Basophil # : 0.03 K/uL  Auto Neutrophil % : 75.3 %  Auto Lymphocyte % : 14.8 %  Auto Monocyte % : 9.0 %  Auto Eosinophil % : 0.2 %  Auto Basophil % : 0.3 %  CBC Full  -  ( 06 Mar 2018 12:27 )  WBC Count : 13.1 K/uL  Hemoglobin : 14.6 g/dL  Hematocrit : 41.6 %  Platelet Count - Automated : 239 K/uL  Mean Cell Volume : 88.4 fl  Mean Cell Hemoglobin : 31.1 pg  Mean Cell Hemoglobin Concentration : 35.2 gm/dL  Auto Neutrophil # : 12.2 K/uL  Auto Lymphocyte # : 0.7 K/uL  Auto Monocyte # : 0.2 K/uL  Auto Eosinophil # : 0.0 K/uL  Auto Basophil # : 0.0 K/uL  Auto Neutrophil % : 93.2 %  Auto Lymphocyte % : 5.1 %  Auto Monocyte % : 1.4 %  Auto Eosinophil % : 0.1 %  Auto Basophil % : 0.2 %    07 Mar 2018 05:54    139    |  106    |  11     ----------------------------<  121    3.7     |  26     |  1.06   06 Mar 2018 12:27    138    |  105    |  15     ----------------------------<  150    4.0     |  28     |  1.13     Ca    8.2        07 Mar 2018 05:54  Ca    8.4        06 Mar 2018 12:27        PHYSICAL EXAM:      Constitutional: Appears in severe pain/ uncomfortable, unable to lie flat    Respiratory: CTA B/L.  No W/R/R    Cardiovascular: S1 and S2, no murmur    Gastrointestinal: abdomen nondistended, incision sites C/D/I with Dermabond, NAREN insertion site clean and dry.  + voluntary guarding, + suprapubic tenderness with palpation, no rebount.  NAREN with serosanguinous outputs.    Genitourinary:  No scrotal edema.  Cunningham catheter in place, draining clear urine-- no hematuria.    Extremities: No C/C/E    Neurological: A&Ox3    Skin: Warm and dry, intact    Musculoskeletal:  FROM x 4      Assessment/Plan: POD # 1 s/p Robotic Radical Nephrectomy with intermittent severe suprapubic pain c/w bladder spasms    -- D/W Dr. Mckenna  -- Continue analgesics  -- Start Ditropan 5mg PO every 8 hours  -- Cunningham catheter advanced one inch  -- Maintain cunningham catheter to gravity  -- DVT prophylaxis  -- observation  -- am labs 3/8/18

## 2018-03-08 ENCOUNTER — TRANSCRIPTION ENCOUNTER (OUTPATIENT)
Age: 63
End: 2018-03-08

## 2018-03-08 VITALS
SYSTOLIC BLOOD PRESSURE: 145 MMHG | TEMPERATURE: 99 F | OXYGEN SATURATION: 95 % | DIASTOLIC BLOOD PRESSURE: 81 MMHG | RESPIRATION RATE: 18 BRPM | HEART RATE: 75 BPM

## 2018-03-08 LAB
ANION GAP SERPL CALC-SCNC: 8 MMOL/L — SIGNIFICANT CHANGE UP (ref 5–17)
BUN SERPL-MCNC: 17 MG/DL — SIGNIFICANT CHANGE UP (ref 7–23)
CALCIUM SERPL-MCNC: 8.7 MG/DL — SIGNIFICANT CHANGE UP (ref 8.5–10.1)
CHLORIDE SERPL-SCNC: 104 MMOL/L — SIGNIFICANT CHANGE UP (ref 96–108)
CO2 SERPL-SCNC: 26 MMOL/L — SIGNIFICANT CHANGE UP (ref 22–31)
CREAT SERPL-MCNC: 1.04 MG/DL — SIGNIFICANT CHANGE UP (ref 0.5–1.3)
GLUCOSE SERPL-MCNC: 105 MG/DL — HIGH (ref 70–99)
HCT VFR BLD CALC: 36.2 % — LOW (ref 39–50)
HGB BLD-MCNC: 12.8 G/DL — LOW (ref 13–17)
MCHC RBC-ENTMCNC: 31.6 PG — SIGNIFICANT CHANGE UP (ref 27–34)
MCHC RBC-ENTMCNC: 35.4 GM/DL — SIGNIFICANT CHANGE UP (ref 32–36)
MCV RBC AUTO: 89.4 FL — SIGNIFICANT CHANGE UP (ref 80–100)
NRBC # BLD: 0 /100 WBCS — SIGNIFICANT CHANGE UP (ref 0–0)
PLATELET # BLD AUTO: 201 K/UL — SIGNIFICANT CHANGE UP (ref 150–400)
POTASSIUM SERPL-MCNC: 3.7 MMOL/L — SIGNIFICANT CHANGE UP (ref 3.5–5.3)
POTASSIUM SERPL-SCNC: 3.7 MMOL/L — SIGNIFICANT CHANGE UP (ref 3.5–5.3)
RBC # BLD: 4.05 M/UL — LOW (ref 4.2–5.8)
RBC # FLD: 11.9 % — SIGNIFICANT CHANGE UP (ref 10.3–14.5)
SODIUM SERPL-SCNC: 138 MMOL/L — SIGNIFICANT CHANGE UP (ref 135–145)
WBC # BLD: 8.08 K/UL — SIGNIFICANT CHANGE UP (ref 3.8–10.5)
WBC # FLD AUTO: 8.08 K/UL — SIGNIFICANT CHANGE UP (ref 3.8–10.5)

## 2018-03-08 RX ORDER — OXYBUTYNIN CHLORIDE 5 MG
1 TABLET ORAL
Qty: 45 | Refills: 0 | OUTPATIENT
Start: 2018-03-08 | End: 2018-03-22

## 2018-03-08 RX ORDER — ALPRAZOLAM 0.25 MG
0 TABLET ORAL
Qty: 0 | Refills: 0 | COMMUNITY

## 2018-03-08 RX ADMIN — SODIUM CHLORIDE 3 MILLILITER(S): 9 INJECTION INTRAMUSCULAR; INTRAVENOUS; SUBCUTANEOUS at 06:59

## 2018-03-08 RX ADMIN — Medication 100 MILLIGRAM(S): at 05:54

## 2018-03-08 RX ADMIN — Medication 5 MILLIGRAM(S): at 05:54

## 2018-03-08 RX ADMIN — HEPARIN SODIUM 5000 UNIT(S): 5000 INJECTION INTRAVENOUS; SUBCUTANEOUS at 05:56

## 2018-03-08 RX ADMIN — OXYCODONE HYDROCHLORIDE 10 MILLIGRAM(S): 5 TABLET ORAL at 03:43

## 2018-03-08 RX ADMIN — Medication 30 MILLIGRAM(S): at 05:55

## 2018-03-08 RX ADMIN — Medication 30 MILLIGRAM(S): at 06:10

## 2018-03-08 RX ADMIN — Medication 10 MILLIGRAM(S): at 05:55

## 2018-03-08 RX ADMIN — Medication 60 MILLIGRAM(S): at 05:54

## 2018-03-08 RX ADMIN — PANTOPRAZOLE SODIUM 40 MILLIGRAM(S): 20 TABLET, DELAYED RELEASE ORAL at 07:38

## 2018-03-08 NOTE — DISCHARGE NOTE ADULT - CARE PLAN
Principal Discharge DX:	Prostate cancer  Goal:	recovery surgery Principal Discharge DX:	Prostate cancer  Goal:	recovery surgery  Assessment and plan of treatment:	s/p Prostatectomy  -c/w cunningham cath for a week  -ambulate as tolerated  -pain control  -c/w Ditropan until see dr. Mckenna

## 2018-03-08 NOTE — DISCHARGE NOTE ADULT - HOSPITAL COURSE
Status Post:  Robotic Radical Prostatectomy. Pt is on Francois and NAREN drain. NAREN drain removed prior discharge. Pt given Ditropan for bladder spasm. Pt tolerated  pain.

## 2018-03-08 NOTE — PROVIDER CONTACT NOTE (OTHER) - BACKGROUND
Just a message before you leave our office:    Thank you for attending your urology appointment today.  I aim to make your appointment as positive as possible.     I hope that you have had your questions answered and that you understand any plans that have been discussed.  If you do not or if you have more questions, please call me at your convenience with daytime and night time numbers.  The number to call if you have questions is [phone].  If you would rather speak to a person directly, please call my  Jany at [phone] or my nurse Mikayla at [phone].      Please finalize the following details for your follow up:  1. Date and location of your follow up visit.  Please confirm the date and location of your next office and/or surgery appointment.  If you would like to confirm your appointment, please feel free to call Jany or Mikayla at the numbers listed below.  Remember that I see patients in 3 office locations and in the hospital.  Be sure that your follow up appointment is at the location you prefer.     2. Please take a card with my contact information    3. Please consider signing up for [facility].  This will allow you to set up an online account to check your own labs and to send me an email.    Thank you.    Shaquille Lima     PSA, Total (ng/mL)   Date Value   08/09/2017 <0.01   05/11/2017 <0.01   02/08/2017 <0.01   07/07/2016 11.70 (H)   03/29/2016 9.00 (H)   11/02/2015 8.20 (H)   02/27/2013 5.13 (H)        I will see him in 3 months with a PSA.  Sudafed may help with urine leakage during intercourse.  I made this recommendation.     Herson Lima MD    
Pt. had radical prostectomy on 3/6, pt. having pain controlled, pt. alerted me to leaking around the site around 830pm

## 2018-03-08 NOTE — DISCHARGE NOTE ADULT - PLAN OF CARE
recovery surgery s/p Prostatectomy  -c/w cunningham cath for a week  -ambulate as tolerated  -pain control  -c/w Ditropan until see dr. Mckenna

## 2018-03-08 NOTE — DISCHARGE NOTE ADULT - PATIENT PORTAL LINK FT
You can access the "[employer] Patient Portal, offered by [facility], by registering with the following website: [email or web address]

## 2018-03-08 NOTE — DISCHARGE NOTE ADULT - MEDICATION SUMMARY - MEDICATIONS TO TAKE
I will START or STAY ON the medications listed below when I get home from the hospital:    oxyCODONE 10 mg oral tablet  -- 1 tab(s) by mouth every 6 hours, As Needed  -- Indication: For PAIN    enalapril 10 mg oral tablet  -- 1 tab(s) by mouth once a day  -- Indication: For Per PMD    gabapentin 100 mg oral capsule  -- 3 cap(s) by mouth 1 times a day  -- Indication: For PAIN    NIFEdipine 60 mg oral tablet, extended release  -- 1 tab(s) by mouth once a day  -- Indication: For HTN    omeprazole 20 mg oral delayed release capsule  -- 1 cap(s) by mouth once a day  -- Indication: For GERD    oxybutynin 5 mg oral tablet  -- 1 tab(s) by mouth every 8 hours -for bladder spasms   -- Indication: For Bladder spasm

## 2018-03-09 LAB — SURGICAL PATHOLOGY FINAL REPORT - CH: SIGNIFICANT CHANGE UP

## 2018-03-12 ENCOUNTER — APPOINTMENT (OUTPATIENT)
Dept: UROLOGY | Facility: CLINIC | Age: 63
End: 2018-03-12
Payer: MEDICARE

## 2018-03-12 VITALS
SYSTOLIC BLOOD PRESSURE: 134 MMHG | TEMPERATURE: 98 F | DIASTOLIC BLOOD PRESSURE: 77 MMHG | HEART RATE: 80 BPM | OXYGEN SATURATION: 98 %

## 2018-03-12 PROCEDURE — 99024 POSTOP FOLLOW-UP VISIT: CPT

## 2018-03-14 DIAGNOSIS — C61 MALIGNANT NEOPLASM OF PROSTATE: ICD-10-CM

## 2018-03-19 DIAGNOSIS — K21.9 GASTRO-ESOPHAGEAL REFLUX DISEASE WITHOUT ESOPHAGITIS: ICD-10-CM

## 2018-03-19 DIAGNOSIS — G89.29 OTHER CHRONIC PAIN: ICD-10-CM

## 2018-03-19 DIAGNOSIS — I10 ESSENTIAL (PRIMARY) HYPERTENSION: ICD-10-CM

## 2018-03-19 DIAGNOSIS — G62.9 POLYNEUROPATHY, UNSPECIFIED: ICD-10-CM

## 2018-03-19 DIAGNOSIS — C61 MALIGNANT NEOPLASM OF PROSTATE: ICD-10-CM

## 2018-03-19 DIAGNOSIS — R09.82 POSTNASAL DRIP: ICD-10-CM

## 2018-03-19 DIAGNOSIS — K66.0 PERITONEAL ADHESIONS (POSTPROCEDURAL) (POSTINFECTION): ICD-10-CM

## 2018-03-19 DIAGNOSIS — Z85.828 PERSONAL HISTORY OF OTHER MALIGNANT NEOPLASM OF SKIN: ICD-10-CM

## 2018-03-19 DIAGNOSIS — M19.90 UNSPECIFIED OSTEOARTHRITIS, UNSPECIFIED SITE: ICD-10-CM

## 2018-03-19 DIAGNOSIS — N32.89 OTHER SPECIFIED DISORDERS OF BLADDER: ICD-10-CM

## 2018-03-19 DIAGNOSIS — E78.5 HYPERLIPIDEMIA, UNSPECIFIED: ICD-10-CM

## 2018-03-26 ENCOUNTER — APPOINTMENT (OUTPATIENT)
Dept: UROLOGY | Facility: CLINIC | Age: 63
End: 2018-03-26

## 2018-04-16 ENCOUNTER — APPOINTMENT (OUTPATIENT)
Dept: UROLOGY | Facility: CLINIC | Age: 63
End: 2018-04-16
Payer: MEDICARE

## 2018-04-16 VITALS
HEART RATE: 73 BPM | TEMPERATURE: 99.1 F | SYSTOLIC BLOOD PRESSURE: 127 MMHG | DIASTOLIC BLOOD PRESSURE: 80 MMHG | OXYGEN SATURATION: 95 %

## 2018-04-16 PROCEDURE — 99024 POSTOP FOLLOW-UP VISIT: CPT

## 2018-04-19 ENCOUNTER — APPOINTMENT (OUTPATIENT)
Dept: RADIATION ONCOLOGY | Facility: CLINIC | Age: 63
End: 2018-04-19
Payer: MEDICARE

## 2018-04-19 VITALS
RESPIRATION RATE: 16 BRPM | HEART RATE: 78 BPM | SYSTOLIC BLOOD PRESSURE: 122 MMHG | DIASTOLIC BLOOD PRESSURE: 81 MMHG | BODY MASS INDEX: 25.96 KG/M2 | OXYGEN SATURATION: 95 % | WEIGHT: 165.4 LBS | HEIGHT: 67 IN

## 2018-04-19 PROCEDURE — 77263 THER RADIOLOGY TX PLNG CPLX: CPT

## 2018-04-19 PROCEDURE — 99215 OFFICE O/P EST HI 40 MIN: CPT

## 2018-04-19 RX ORDER — METHYLPREDNISOLONE 4 MG/1
4 TABLET ORAL
Qty: 21 | Refills: 0 | Status: COMPLETED | COMMUNITY
Start: 2017-12-01

## 2018-04-19 RX ORDER — AMOXICILLIN AND CLAVULANATE POTASSIUM 875; 125 MG/1; MG/1
875-125 TABLET, COATED ORAL
Qty: 20 | Refills: 0 | Status: COMPLETED | COMMUNITY
Start: 2018-01-07

## 2018-04-19 RX ORDER — CEFUROXIME AXETIL 250 MG/1
250 TABLET ORAL
Qty: 14 | Refills: 0 | Status: COMPLETED | COMMUNITY
Start: 2017-12-22

## 2018-04-23 ENCOUNTER — OUTPATIENT (OUTPATIENT)
Dept: OUTPATIENT SERVICES | Facility: HOSPITAL | Age: 63
LOS: 1 days | Discharge: HOME | End: 2018-04-23

## 2018-04-23 DIAGNOSIS — Z98.890 OTHER SPECIFIED POSTPROCEDURAL STATES: Chronic | ICD-10-CM

## 2018-04-23 DIAGNOSIS — Z96.7 PRESENCE OF OTHER BONE AND TENDON IMPLANTS: Chronic | ICD-10-CM

## 2018-04-23 DIAGNOSIS — Z85.46 PERSONAL HISTORY OF MALIGNANT NEOPLASM OF PROSTATE: ICD-10-CM

## 2018-04-25 ENCOUNTER — APPOINTMENT (OUTPATIENT)
Dept: HEMATOLOGY ONCOLOGY | Facility: CLINIC | Age: 63
End: 2018-04-25
Payer: MEDICARE

## 2018-04-25 VITALS
DIASTOLIC BLOOD PRESSURE: 96 MMHG | HEIGHT: 66.93 IN | HEART RATE: 80 BPM | TEMPERATURE: 99.1 F | BODY MASS INDEX: 25.57 KG/M2 | WEIGHT: 162.92 LBS | SYSTOLIC BLOOD PRESSURE: 152 MMHG | OXYGEN SATURATION: 97 %

## 2018-04-25 PROCEDURE — 99205 OFFICE O/P NEW HI 60 MIN: CPT

## 2018-04-26 ENCOUNTER — APPOINTMENT (OUTPATIENT)
Dept: INFUSION THERAPY | Facility: CLINIC | Age: 63
End: 2018-04-26

## 2018-04-27 ENCOUNTER — RESULT REVIEW (OUTPATIENT)
Age: 63
End: 2018-04-27

## 2018-04-27 PROCEDURE — 77300 RADIATION THERAPY DOSE PLAN: CPT | Mod: 26

## 2018-04-27 PROCEDURE — 77301 RADIOTHERAPY DOSE PLAN IMRT: CPT | Mod: 26

## 2018-04-27 PROCEDURE — 77338 DESIGN MLC DEVICE FOR IMRT: CPT | Mod: 26

## 2018-04-30 DIAGNOSIS — C61 MALIGNANT NEOPLASM OF PROSTATE: ICD-10-CM

## 2018-05-02 VITALS
WEIGHT: 162 LBS | TEMPERATURE: 98.1 F | RESPIRATION RATE: 15 BRPM | HEART RATE: 69 BPM | OXYGEN SATURATION: 98 % | BODY MASS INDEX: 26.03 KG/M2 | HEIGHT: 66 IN | SYSTOLIC BLOOD PRESSURE: 160 MMHG | DIASTOLIC BLOOD PRESSURE: 94 MMHG

## 2018-05-02 LAB — SURGICAL PATHOLOGY STUDY: SIGNIFICANT CHANGE UP

## 2018-05-02 PROCEDURE — 77387B: CUSTOM | Mod: 26

## 2018-05-02 PROCEDURE — 77427 RADIATION TX MANAGEMENT X5: CPT

## 2018-05-02 RX ORDER — GABAPENTIN 100 MG/1
100 CAPSULE ORAL
Qty: 120 | Refills: 0 | Status: COMPLETED | COMMUNITY
Start: 2017-12-15

## 2018-05-03 PROCEDURE — 77387B: CUSTOM | Mod: 26

## 2018-05-04 PROCEDURE — 77387B: CUSTOM | Mod: 26

## 2018-05-07 ENCOUNTER — APPOINTMENT (OUTPATIENT)
Dept: UROLOGY | Facility: CLINIC | Age: 63
End: 2018-05-07

## 2018-05-07 VITALS
RESPIRATION RATE: 16 BRPM | OXYGEN SATURATION: 98 % | BODY MASS INDEX: 26.52 KG/M2 | DIASTOLIC BLOOD PRESSURE: 83 MMHG | WEIGHT: 165 LBS | SYSTOLIC BLOOD PRESSURE: 133 MMHG | TEMPERATURE: 98.3 F | HEART RATE: 71 BPM | HEIGHT: 66 IN

## 2018-05-07 PROCEDURE — 77387B: CUSTOM | Mod: 26

## 2018-05-08 PROCEDURE — 77387B: CUSTOM | Mod: 26

## 2018-05-09 PROCEDURE — 77014: CPT | Mod: 26

## 2018-05-09 PROCEDURE — 77427 RADIATION TX MANAGEMENT X5: CPT

## 2018-05-10 PROCEDURE — 77387B: CUSTOM | Mod: 26

## 2018-05-11 PROCEDURE — 77387B: CUSTOM | Mod: 26

## 2018-05-14 VITALS
SYSTOLIC BLOOD PRESSURE: 142 MMHG | HEIGHT: 66 IN | DIASTOLIC BLOOD PRESSURE: 96 MMHG | TEMPERATURE: 98.2 F | BODY MASS INDEX: 26.52 KG/M2 | RESPIRATION RATE: 15 BRPM | HEART RATE: 69 BPM | OXYGEN SATURATION: 98 % | WEIGHT: 165 LBS

## 2018-05-14 PROCEDURE — 77387B: CUSTOM | Mod: 26

## 2018-05-15 PROCEDURE — 77387B: CUSTOM | Mod: 26

## 2018-05-16 PROCEDURE — 77014: CPT | Mod: 26

## 2018-05-16 PROCEDURE — 77427 RADIATION TX MANAGEMENT X5: CPT

## 2018-05-17 PROCEDURE — 77387B: CUSTOM | Mod: 26

## 2018-05-18 PROCEDURE — 77387B: CUSTOM | Mod: 26

## 2018-05-21 VITALS
HEIGHT: 66 IN | SYSTOLIC BLOOD PRESSURE: 147 MMHG | DIASTOLIC BLOOD PRESSURE: 93 MMHG | OXYGEN SATURATION: 98 % | HEART RATE: 64 BPM | WEIGHT: 167 LBS | TEMPERATURE: 97.8 F | BODY MASS INDEX: 26.84 KG/M2 | RESPIRATION RATE: 16 BRPM

## 2018-05-21 PROCEDURE — 77387B: CUSTOM | Mod: 26

## 2018-05-22 ENCOUNTER — OUTPATIENT (OUTPATIENT)
Dept: OUTPATIENT SERVICES | Facility: HOSPITAL | Age: 63
LOS: 1 days | Discharge: HOME | End: 2018-05-22

## 2018-05-22 DIAGNOSIS — Z98.890 OTHER SPECIFIED POSTPROCEDURAL STATES: Chronic | ICD-10-CM

## 2018-05-22 DIAGNOSIS — Z85.46 PERSONAL HISTORY OF MALIGNANT NEOPLASM OF PROSTATE: ICD-10-CM

## 2018-05-22 DIAGNOSIS — Z96.7 PRESENCE OF OTHER BONE AND TENDON IMPLANTS: Chronic | ICD-10-CM

## 2018-05-22 PROCEDURE — 77387B: CUSTOM | Mod: 26

## 2018-05-23 PROCEDURE — 77427 RADIATION TX MANAGEMENT X5: CPT

## 2018-05-23 PROCEDURE — 77014: CPT | Mod: 26

## 2018-05-24 ENCOUNTER — APPOINTMENT (OUTPATIENT)
Dept: HEMATOLOGY ONCOLOGY | Facility: CLINIC | Age: 63
End: 2018-05-24
Payer: MEDICARE

## 2018-05-24 ENCOUNTER — RESULT REVIEW (OUTPATIENT)
Age: 63
End: 2018-05-24

## 2018-05-24 ENCOUNTER — APPOINTMENT (OUTPATIENT)
Dept: INFUSION THERAPY | Facility: CLINIC | Age: 63
End: 2018-05-24

## 2018-05-24 VITALS
BODY MASS INDEX: 26.92 KG/M2 | WEIGHT: 166.78 LBS | OXYGEN SATURATION: 98 % | DIASTOLIC BLOOD PRESSURE: 92 MMHG | HEART RATE: 66 BPM | SYSTOLIC BLOOD PRESSURE: 151 MMHG

## 2018-05-24 LAB
BASOPHILS # BLD AUTO: 0.1 K/UL — SIGNIFICANT CHANGE UP (ref 0–0.2)
BASOPHILS NFR BLD AUTO: 0.7 % — SIGNIFICANT CHANGE UP (ref 0–2)
EOSINOPHIL # BLD AUTO: 0.3 K/UL — SIGNIFICANT CHANGE UP (ref 0–0.5)
EOSINOPHIL NFR BLD AUTO: 4.3 % — SIGNIFICANT CHANGE UP (ref 0–6)
HCT VFR BLD CALC: 39.7 % — SIGNIFICANT CHANGE UP (ref 39–50)
HGB BLD-MCNC: 14.8 G/DL — SIGNIFICANT CHANGE UP (ref 13–17)
LYMPHOCYTES # BLD AUTO: 0.6 K/UL — LOW (ref 1–3.3)
LYMPHOCYTES # BLD AUTO: 7.2 % — LOW (ref 13–44)
MCHC RBC-ENTMCNC: 31.8 PG — SIGNIFICANT CHANGE UP (ref 27–34)
MCHC RBC-ENTMCNC: 37.2 GM/DL — HIGH (ref 32–36)
MCV RBC AUTO: 85.7 FL — SIGNIFICANT CHANGE UP (ref 80–100)
MONOCYTES # BLD AUTO: 0.6 K/UL — SIGNIFICANT CHANGE UP (ref 0–0.9)
MONOCYTES NFR BLD AUTO: 7.8 % — SIGNIFICANT CHANGE UP (ref 2–14)
NEUTROPHILS # BLD AUTO: 6.3 K/UL — SIGNIFICANT CHANGE UP (ref 1.8–7.4)
NEUTROPHILS NFR BLD AUTO: 79.9 % — HIGH (ref 43–77)
PLATELET # BLD AUTO: 179 K/UL — SIGNIFICANT CHANGE UP (ref 150–400)
RBC # BLD: 4.63 M/UL — SIGNIFICANT CHANGE UP (ref 4.2–5.8)
RBC # FLD: 10.6 % — SIGNIFICANT CHANGE UP (ref 10.3–14.5)
WBC # BLD: 7.9 K/UL — SIGNIFICANT CHANGE UP (ref 3.8–10.5)
WBC # FLD AUTO: 7.9 K/UL — SIGNIFICANT CHANGE UP (ref 3.8–10.5)

## 2018-05-24 PROCEDURE — 99213 OFFICE O/P EST LOW 20 MIN: CPT

## 2018-05-24 PROCEDURE — 77387B: CUSTOM | Mod: 26

## 2018-05-24 PROCEDURE — 77470 SPECIAL RADIATION TREATMENT: CPT | Mod: 26

## 2018-05-25 DIAGNOSIS — C61 MALIGNANT NEOPLASM OF PROSTATE: ICD-10-CM

## 2018-05-25 PROCEDURE — 77387B: CUSTOM | Mod: 26

## 2018-05-29 PROCEDURE — 77387B: CUSTOM | Mod: 26

## 2018-05-30 VITALS
TEMPERATURE: 97.6 F | RESPIRATION RATE: 16 BRPM | HEART RATE: 60 BPM | BODY MASS INDEX: 26.97 KG/M2 | WEIGHT: 167.8 LBS | OXYGEN SATURATION: 99 % | SYSTOLIC BLOOD PRESSURE: 158 MMHG | HEIGHT: 66 IN | DIASTOLIC BLOOD PRESSURE: 92 MMHG

## 2018-05-30 LAB — PSA SERPL-MCNC: 0.05 NG/ML

## 2018-05-30 PROCEDURE — 77014: CPT | Mod: 26

## 2018-05-31 PROCEDURE — 77387B: CUSTOM | Mod: 26

## 2018-06-01 PROCEDURE — 77387B: CUSTOM | Mod: 26

## 2018-06-01 PROCEDURE — 77427 RADIATION TX MANAGEMENT X5: CPT

## 2018-06-04 VITALS
OXYGEN SATURATION: 98 % | RESPIRATION RATE: 16 BRPM | DIASTOLIC BLOOD PRESSURE: 95 MMHG | SYSTOLIC BLOOD PRESSURE: 158 MMHG | HEIGHT: 66 IN | HEART RATE: 60 BPM | WEIGHT: 167 LBS | TEMPERATURE: 97.6 F | BODY MASS INDEX: 26.84 KG/M2

## 2018-06-04 PROCEDURE — 77387B: CUSTOM | Mod: 26

## 2018-06-05 PROCEDURE — 77387B: CUSTOM | Mod: 26

## 2018-06-06 ENCOUNTER — OTHER (OUTPATIENT)
Age: 63
End: 2018-06-06

## 2018-06-06 PROCEDURE — 77014: CPT | Mod: 26

## 2018-06-07 PROCEDURE — 77387B: CUSTOM | Mod: 26

## 2018-06-07 PROCEDURE — 77300 RADIATION THERAPY DOSE PLAN: CPT | Mod: 26

## 2018-06-07 PROCEDURE — 77338 DESIGN MLC DEVICE FOR IMRT: CPT | Mod: 26

## 2018-06-07 PROCEDURE — 77427 RADIATION TX MANAGEMENT X5: CPT

## 2018-06-08 PROCEDURE — 77387B: CUSTOM | Mod: 26

## 2018-06-11 VITALS
HEIGHT: 66 IN | HEART RATE: 60 BPM | WEIGHT: 169 LBS | SYSTOLIC BLOOD PRESSURE: 183 MMHG | DIASTOLIC BLOOD PRESSURE: 98 MMHG | RESPIRATION RATE: 16 BRPM | BODY MASS INDEX: 27.16 KG/M2 | OXYGEN SATURATION: 99 % | TEMPERATURE: 97.4 F

## 2018-06-11 PROCEDURE — 77387B: CUSTOM | Mod: 26

## 2018-06-12 PROCEDURE — 77387B: CUSTOM | Mod: 26

## 2018-06-13 PROCEDURE — 77427 RADIATION TX MANAGEMENT X5: CPT

## 2018-06-13 PROCEDURE — 77014: CPT | Mod: 26

## 2018-06-14 PROCEDURE — 77427 RADIATION TX MANAGEMENT X5: CPT

## 2018-06-14 PROCEDURE — 77387B: CUSTOM | Mod: 26

## 2018-06-18 PROCEDURE — 77387B: CUSTOM | Mod: 26

## 2018-06-19 VITALS
HEART RATE: 69 BPM | OXYGEN SATURATION: 96 % | RESPIRATION RATE: 16 BRPM | BODY MASS INDEX: 26.84 KG/M2 | DIASTOLIC BLOOD PRESSURE: 91 MMHG | HEIGHT: 66 IN | WEIGHT: 167 LBS | SYSTOLIC BLOOD PRESSURE: 145 MMHG | TEMPERATURE: 97.7 F

## 2018-06-19 PROCEDURE — 77387B: CUSTOM | Mod: 26

## 2018-06-20 ENCOUNTER — APPOINTMENT (OUTPATIENT)
Dept: UROLOGY | Facility: CLINIC | Age: 63
End: 2018-06-20

## 2018-06-20 PROCEDURE — 77014: CPT | Mod: 26

## 2018-06-21 PROCEDURE — 77387B: CUSTOM | Mod: 26

## 2018-06-22 PROCEDURE — 77427 RADIATION TX MANAGEMENT X5: CPT

## 2018-06-22 PROCEDURE — 77387B: CUSTOM | Mod: 26

## 2018-06-25 ENCOUNTER — APPOINTMENT (OUTPATIENT)
Dept: UROLOGY | Facility: CLINIC | Age: 63
End: 2018-06-25
Payer: MEDICARE

## 2018-06-25 VITALS
DIASTOLIC BLOOD PRESSURE: 102 MMHG | WEIGHT: 167 LBS | OXYGEN SATURATION: 97 % | SYSTOLIC BLOOD PRESSURE: 157 MMHG | TEMPERATURE: 98.1 F | BODY MASS INDEX: 26.84 KG/M2 | RESPIRATION RATE: 16 BRPM | HEART RATE: 63 BPM | HEIGHT: 66 IN

## 2018-06-25 PROCEDURE — 99213 OFFICE O/P EST LOW 20 MIN: CPT

## 2018-06-25 PROCEDURE — 77387B: CUSTOM | Mod: 26

## 2018-06-26 PROCEDURE — 77387B: CUSTOM | Mod: 26

## 2018-06-27 PROCEDURE — 77387B: CUSTOM | Mod: 26

## 2018-07-17 ENCOUNTER — APPOINTMENT (OUTPATIENT)
Dept: RADIATION ONCOLOGY | Facility: CLINIC | Age: 63
End: 2018-07-17
Payer: MEDICARE

## 2018-07-17 VITALS
WEIGHT: 167 LBS | DIASTOLIC BLOOD PRESSURE: 67 MMHG | HEART RATE: 68 BPM | SYSTOLIC BLOOD PRESSURE: 135 MMHG | RESPIRATION RATE: 16 BRPM | TEMPERATURE: 98 F | BODY MASS INDEX: 26.84 KG/M2 | HEIGHT: 66 IN | OXYGEN SATURATION: 94 %

## 2018-07-17 PROBLEM — C44.90 UNSPECIFIED MALIGNANT NEOPLASM OF SKIN, UNSPECIFIED: Chronic | Status: ACTIVE | Noted: 2018-02-28

## 2018-07-17 PROBLEM — G62.9 POLYNEUROPATHY, UNSPECIFIED: Chronic | Status: ACTIVE | Noted: 2018-02-28

## 2018-07-17 PROBLEM — C61 MALIGNANT NEOPLASM OF PROSTATE: Chronic | Status: ACTIVE | Noted: 2018-02-28

## 2018-07-17 PROBLEM — I10 ESSENTIAL (PRIMARY) HYPERTENSION: Chronic | Status: ACTIVE | Noted: 2018-02-28

## 2018-07-17 PROBLEM — K21.9 GASTRO-ESOPHAGEAL REFLUX DISEASE WITHOUT ESOPHAGITIS: Chronic | Status: ACTIVE | Noted: 2018-02-28

## 2018-07-17 PROBLEM — F41.9 ANXIETY DISORDER, UNSPECIFIED: Chronic | Status: ACTIVE | Noted: 2018-02-28

## 2018-07-17 PROCEDURE — 99024 POSTOP FOLLOW-UP VISIT: CPT

## 2018-08-06 ENCOUNTER — OUTPATIENT (OUTPATIENT)
Dept: OUTPATIENT SERVICES | Facility: HOSPITAL | Age: 63
LOS: 1 days | Discharge: HOME | End: 2018-08-06

## 2018-08-06 DIAGNOSIS — Z98.890 OTHER SPECIFIED POSTPROCEDURAL STATES: Chronic | ICD-10-CM

## 2018-08-06 DIAGNOSIS — Z96.7 PRESENCE OF OTHER BONE AND TENDON IMPLANTS: Chronic | ICD-10-CM

## 2018-08-06 DIAGNOSIS — Z85.46 PERSONAL HISTORY OF MALIGNANT NEOPLASM OF PROSTATE: ICD-10-CM

## 2018-08-14 ENCOUNTER — RESULT REVIEW (OUTPATIENT)
Age: 63
End: 2018-08-14

## 2018-08-14 ENCOUNTER — APPOINTMENT (OUTPATIENT)
Dept: HEMATOLOGY ONCOLOGY | Facility: CLINIC | Age: 63
End: 2018-08-14
Payer: MEDICARE

## 2018-08-14 ENCOUNTER — APPOINTMENT (OUTPATIENT)
Dept: INFUSION THERAPY | Facility: CLINIC | Age: 63
End: 2018-08-14

## 2018-08-14 VITALS
SYSTOLIC BLOOD PRESSURE: 148 MMHG | HEART RATE: 62 BPM | OXYGEN SATURATION: 98 % | HEIGHT: 66 IN | BODY MASS INDEX: 27.07 KG/M2 | DIASTOLIC BLOOD PRESSURE: 89 MMHG | WEIGHT: 168.43 LBS

## 2018-08-14 LAB
BASOPHILS # BLD AUTO: 0 K/UL — SIGNIFICANT CHANGE UP (ref 0–0.2)
BASOPHILS NFR BLD AUTO: 0.8 % — SIGNIFICANT CHANGE UP (ref 0–2)
EOSINOPHIL # BLD AUTO: 0.4 K/UL — SIGNIFICANT CHANGE UP (ref 0–0.5)
EOSINOPHIL NFR BLD AUTO: 8.2 % — HIGH (ref 0–6)
HCT VFR BLD CALC: 37.8 % — LOW (ref 39–50)
HGB BLD-MCNC: 13.8 G/DL — SIGNIFICANT CHANGE UP (ref 13–17)
LYMPHOCYTES # BLD AUTO: 1.2 K/UL — SIGNIFICANT CHANGE UP (ref 1–3.3)
LYMPHOCYTES # BLD AUTO: 25.8 % — SIGNIFICANT CHANGE UP (ref 13–44)
MCHC RBC-ENTMCNC: 32 PG — SIGNIFICANT CHANGE UP (ref 27–34)
MCHC RBC-ENTMCNC: 36.6 GM/DL — HIGH (ref 32–36)
MCV RBC AUTO: 87.5 FL — SIGNIFICANT CHANGE UP (ref 80–100)
MONOCYTES # BLD AUTO: 0.4 K/UL — SIGNIFICANT CHANGE UP (ref 0–0.9)
MONOCYTES NFR BLD AUTO: 8.3 % — SIGNIFICANT CHANGE UP (ref 2–14)
NEUTROPHILS # BLD AUTO: 2.7 K/UL — SIGNIFICANT CHANGE UP (ref 1.8–7.4)
NEUTROPHILS NFR BLD AUTO: 56.9 % — SIGNIFICANT CHANGE UP (ref 43–77)
PLATELET # BLD AUTO: 235 K/UL — SIGNIFICANT CHANGE UP (ref 150–400)
RBC # BLD: 4.32 M/UL — SIGNIFICANT CHANGE UP (ref 4.2–5.8)
RBC # FLD: 11.4 % — SIGNIFICANT CHANGE UP (ref 10.3–14.5)
WBC # BLD: 4.7 K/UL — SIGNIFICANT CHANGE UP (ref 3.8–10.5)
WBC # FLD AUTO: 4.7 K/UL — SIGNIFICANT CHANGE UP (ref 3.8–10.5)

## 2018-08-14 PROCEDURE — 99213 OFFICE O/P EST LOW 20 MIN: CPT

## 2018-08-14 RX ORDER — GABAPENTIN 100 MG/1
CAPSULE ORAL
Refills: 0 | Status: DISCONTINUED | COMMUNITY
End: 2018-08-14

## 2018-08-14 RX ORDER — ALPRAZOLAM 1 MG/1
1 TABLET ORAL
Qty: 60 | Refills: 0 | Status: ACTIVE | COMMUNITY
Start: 2018-08-06

## 2018-08-15 DIAGNOSIS — C61 MALIGNANT NEOPLASM OF PROSTATE: ICD-10-CM

## 2018-08-15 LAB
ALBUMIN SERPL ELPH-MCNC: 4.5 G/DL
ALP BLD-CCNC: 103 U/L
ALT SERPL-CCNC: 42 U/L
ANION GAP SERPL CALC-SCNC: 14 MMOL/L
AST SERPL-CCNC: 24 U/L
BILIRUB SERPL-MCNC: 0.7 MG/DL
BUN SERPL-MCNC: 18 MG/DL
CALCIUM SERPL-MCNC: 9.2 MG/DL
CHLORIDE SERPL-SCNC: 106 MMOL/L
CO2 SERPL-SCNC: 23 MMOL/L
CREAT SERPL-MCNC: 0.86 MG/DL
GLUCOSE SERPL-MCNC: 115 MG/DL
POTASSIUM SERPL-SCNC: 4.7 MMOL/L
PROT SERPL-MCNC: 6.6 G/DL
PSA SERPL-MCNC: <0.01 NG/ML
SODIUM SERPL-SCNC: 143 MMOL/L

## 2018-09-24 ENCOUNTER — APPOINTMENT (OUTPATIENT)
Dept: UROLOGY | Facility: CLINIC | Age: 63
End: 2018-09-24
Payer: MEDICARE

## 2018-09-24 VITALS
HEART RATE: 75 BPM | WEIGHT: 168 LBS | TEMPERATURE: 98.2 F | SYSTOLIC BLOOD PRESSURE: 167 MMHG | BODY MASS INDEX: 27 KG/M2 | DIASTOLIC BLOOD PRESSURE: 100 MMHG | HEIGHT: 66 IN

## 2018-09-24 PROCEDURE — 99213 OFFICE O/P EST LOW 20 MIN: CPT

## 2018-10-15 ENCOUNTER — APPOINTMENT (OUTPATIENT)
Dept: UROLOGY | Facility: CLINIC | Age: 63
End: 2018-10-15
Payer: MEDICARE

## 2018-10-15 VITALS — DIASTOLIC BLOOD PRESSURE: 84 MMHG | SYSTOLIC BLOOD PRESSURE: 128 MMHG | HEART RATE: 79 BPM

## 2018-10-15 PROCEDURE — 81003 URINALYSIS AUTO W/O SCOPE: CPT | Mod: QW

## 2018-10-15 PROCEDURE — 52000 CYSTOURETHROSCOPY: CPT

## 2018-10-16 ENCOUNTER — APPOINTMENT (OUTPATIENT)
Dept: RADIATION ONCOLOGY | Facility: CLINIC | Age: 63
End: 2018-10-16
Payer: MEDICARE

## 2018-10-16 VITALS
HEART RATE: 77 BPM | DIASTOLIC BLOOD PRESSURE: 85 MMHG | HEIGHT: 66 IN | WEIGHT: 174 LBS | RESPIRATION RATE: 16 BRPM | TEMPERATURE: 98 F | BODY MASS INDEX: 27.97 KG/M2 | SYSTOLIC BLOOD PRESSURE: 136 MMHG | OXYGEN SATURATION: 96 %

## 2018-10-16 PROCEDURE — 99214 OFFICE O/P EST MOD 30 MIN: CPT

## 2018-10-16 NOTE — PHYSICAL EXAM
[Extraocular Movements] : extraocular movements were intact [Normal] : oriented to person, place and time, the affect was normal, the mood was normal and not anxious

## 2018-10-16 NOTE — REVIEW OF SYSTEMS
[Anal Pain: Grade 0] : Anal Pain: Grade 0 [Constipation: Grade 0] : Constipation: Grade 0 [Diarrhea: Grade 1 - Increase of <4 stools per day over baseline; mild increase in ostomy output compared to baseline] : Diarrhea: Grade 1 - Increase of <4 stools per day over baseline; mild increase in ostomy output compared to baseline [Fecal Incontinence: Grade 0] : Fecal Incontinence: Grade 0 [Nausea: Grade 0] : Nausea: Grade 0 [Edema Limbs: Grade 0] : Edema Limbs: Grade 0  [Fatigue: Grade 1 - Fatigue relieved by rest] : Fatigue: Grade 1 - Fatigue relieved by rest [Localized Edema: Grade 0] : Localized Edema: Grade 0  [Neck Edema: Grade 0] : Neck Edema: Grade 0 [Hematuria: Grade 0] : Hematuria: Grade 0 [Urinary Incontinence: Grade 1 - Occasional (e.g., with coughing, sneezing, etc.), pads not indicated] : Urinary Incontinence: Grade 1 - Occasional (e.g., with coughing, sneezing, etc.), pads not indicated [Urinary Retention: Grade 0] : Urinary Retention: Grade 0 [Urinary Tract Pain: Grade 0] : Urinary Tract Pain: Grade 0 [Urinary Urgency: Grade 2 - Limiting instrumental ADL; medical management indicated] : Urinary Urgency: Grade 2 - Limiting instrumental ADL; medical management indicated [Urinary Frequency: Grade 2 - Limiting instrumental ADL; medical management indicated] : Urinary Frequency: Grade 2 - Limiting instrumental ADL; medical management indicated [Erectile Dysfunction: Grade 3 - Decrease in erectile function (frequency/rigidity of erections) but erectile intervention not helpful (e.g., medication or mechanical devices such as penile pump); placement of a permanent penile prosthesis indicated (not p] : Erectile Dysfunction: Grade 3 - Decrease in erectile function (frequency/rigidity of erections) but erectile intervention not helpful (e.g., medication or mechanical devices such as penile pump); placement of a permanent penile prosthesis indicated (not previously present)

## 2018-10-30 ENCOUNTER — OUTPATIENT (OUTPATIENT)
Dept: OUTPATIENT SERVICES | Facility: HOSPITAL | Age: 63
LOS: 1 days | End: 2018-10-30
Payer: COMMERCIAL

## 2018-10-30 DIAGNOSIS — Z98.890 OTHER SPECIFIED POSTPROCEDURAL STATES: Chronic | ICD-10-CM

## 2018-10-30 DIAGNOSIS — G90.511 COMPLEX REGIONAL PAIN SYNDROME I OF RIGHT UPPER LIMB: ICD-10-CM

## 2018-10-30 DIAGNOSIS — Z96.7 PRESENCE OF OTHER BONE AND TENDON IMPLANTS: Chronic | ICD-10-CM

## 2018-10-30 PROCEDURE — 64510 N BLOCK STELLATE GANGLION: CPT | Mod: RT

## 2018-10-30 PROCEDURE — 77003 FLUOROGUIDE FOR SPINE INJECT: CPT

## 2018-11-08 ENCOUNTER — APPOINTMENT (OUTPATIENT)
Dept: UROLOGY | Facility: CLINIC | Age: 63
End: 2018-11-08
Payer: MEDICARE

## 2018-11-08 VITALS
HEIGHT: 66 IN | WEIGHT: 172 LBS | HEART RATE: 77 BPM | DIASTOLIC BLOOD PRESSURE: 93 MMHG | SYSTOLIC BLOOD PRESSURE: 153 MMHG | BODY MASS INDEX: 27.64 KG/M2

## 2018-11-08 PROCEDURE — 54235 NJX CORPORA CAVERNOSA RX AGT: CPT

## 2018-11-08 RX ORDER — OXYCODONE 10 MG/1
10 TABLET ORAL
Refills: 0 | Status: DISCONTINUED | COMMUNITY
End: 2018-11-08

## 2018-11-08 RX ORDER — OXYBUTYNIN CHLORIDE 5 MG/1
5 TABLET ORAL
Qty: 45 | Refills: 0 | Status: DISCONTINUED | COMMUNITY
Start: 2018-03-08 | End: 2018-11-08

## 2018-11-08 RX ORDER — ALPROSTADIL 500 UG/1
500 SUPPOSITORY URETHRAL
Qty: 5 | Refills: 3 | Status: DISCONTINUED | COMMUNITY
Start: 2018-10-15 | End: 2018-11-08

## 2018-11-08 RX ORDER — SILDENAFIL 20 MG/1
20 TABLET ORAL
Qty: 90 | Refills: 1 | Status: DISCONTINUED | COMMUNITY
Start: 2018-09-24 | End: 2018-11-08

## 2018-11-08 RX ORDER — TADALAFIL 5 MG/1
5 TABLET ORAL DAILY
Qty: 30 | Refills: 1 | Status: DISCONTINUED | COMMUNITY
Start: 2018-10-16 | End: 2018-11-08

## 2018-11-08 RX ORDER — OXYBUTYNIN CHLORIDE 5 MG/1
5 TABLET ORAL
Qty: 30 | Refills: 0 | Status: DISCONTINUED | COMMUNITY
Start: 2018-05-21 | End: 2018-11-08

## 2018-11-13 ENCOUNTER — APPOINTMENT (OUTPATIENT)
Dept: RADIATION ONCOLOGY | Facility: CLINIC | Age: 63
End: 2018-11-13
Payer: MEDICARE

## 2018-11-13 VITALS
DIASTOLIC BLOOD PRESSURE: 84 MMHG | HEIGHT: 66 IN | TEMPERATURE: 99.2 F | SYSTOLIC BLOOD PRESSURE: 141 MMHG | RESPIRATION RATE: 16 BRPM | OXYGEN SATURATION: 96 % | HEART RATE: 71 BPM | BODY MASS INDEX: 26.2 KG/M2 | WEIGHT: 163 LBS

## 2018-11-13 PROCEDURE — 99214 OFFICE O/P EST MOD 30 MIN: CPT

## 2018-11-13 NOTE — REASON FOR VISIT
[Routine Follow-Up] : routine follow-up visit for [Prostate Cancer] : prostate cancer [Spouse] : spouse

## 2018-11-13 NOTE — HISTORY OF PRESENT ILLNESS
[FreeTextEntry1] : 63 year old gentleman with high grade, high volume prostate adenocarcinoma, [place] 9 s/p robot-assisted radical prostatectomy on 3/14/18 with positive margins and seminal vesicle involvement, followed by adjuvant RT 7020 cGy to prostate bed and lymph nodes completed on 6/27/18.  He continues on ADT.\par Reports that he discontinued oxybutynin when prescription ran out.  He notes significant urinary frequency, particularly nocturia, >5x.  He also notes daytime urinary frequency and urgency.  HIs urinary leakage/incontinence has improved since surgery, though stable and present when coughing or sneezing.  \par He reports irregular bowel movements, with diarrhea requiring Imodium every 2-3 days.  He also notes erectile dysfunction since surgery; he previously tried sildenafil, but experienced adverse side effects.  \par His most recent PSA on 8/15/18 was <0.01 ng/mL.  He is due for his next LHRH agonist depot injection in 12/2018.

## 2018-11-13 NOTE — DISEASE MANAGEMENT
[Pathological] : TNM Stage: p [III] : III [FreeTextEntry4] : [place] 9 prostate adenocarcinoma [TTNM] : 4 [NTNM] : 0 [MTNM] : 0 [de-identified] : prostate bed / pelvis

## 2018-11-13 NOTE — DISEASE MANAGEMENT
[Pathological] : TNM Stage: p [III] : III [FreeTextEntry4] : [place] 9 prostate adenocarcinoma [TTNM] : 4 [NTNM] : 0 [MTNM] : 0 [de-identified] : prostate bed / pelvis

## 2018-11-13 NOTE — HISTORY OF PRESENT ILLNESS
[FreeTextEntry1] : This 63 year-old gentleman is being seen for 1-month follow-up to reassess trial of oxybutynin for urinary frequency/incontinence.  He has a history of prostate adenocarcinoma ([place] 5+4 = 9, PNI, +SV/+bladder neck), status post radical prostatectomy and lymph node sampling on 3/17/18, with post-operative PSA of 1.57 ng/mL on 4/10/18, followed by adjuvant radiation to the prostate bed/pelvis 70.2 Gy completing 6/27/18. He continues on androgen deprivation therapy with appropriate PSA suppression (PSA <0.01 ng/mL on 8/14/18).\par He has noted increased daytime and nighttime urinary frequency, and reports having discontinued oxybutynin when prescription ran out. Will refill oxybutynin, and re-assess urinary frequency.\par Also notes bowel frequency, requiring Imodium every 2-3 days.\par Erectile dysfunction, previously tried sildenafil with adverse side effect. Trial tadalafil for urinary frequency/incontinence.  In interim, he was evaluated by Dr. Richard and tried Tri-Mix self-injections.  He reports some moderate results, though noted general malaise and side effects the next day.\par He also notes decreased muscle strength and increased fatigue since being on ADT, progressive over the past few months.

## 2018-11-13 NOTE — REVIEW OF SYSTEMS
[Constipation: Grade 0] : Constipation: Grade 0 [Diarrhea: Grade 0] : Diarrhea: Grade 0 [Edema Limbs: Grade 0] : Edema Limbs: Grade 0  [Fatigue: Grade 2 - Fatigue not relieved by rest; limiting instrumental ADL] : Fatigue: Grade 2 - Fatigue not relieved by rest; limiting instrumental ADL [Localized Edema: Grade 0] : Localized Edema: Grade 0  [Neck Edema: Grade 0] : Neck Edema: Grade 0 [Urinary Incontinence: Grade 1 - Occasional (e.g., with coughing, sneezing, etc.), pads not indicated] : Urinary Incontinence: Grade 1 - Occasional (e.g., with coughing, sneezing, etc.), pads not indicated [Urinary Retention: Grade 0] : Urinary Retention: Grade 0 [Urinary Tract Pain: Grade 0] : Urinary Tract Pain: Grade 0 [Urinary Urgency: Grade 2 - Limiting instrumental ADL; medical management indicated] : Urinary Urgency: Grade 2 - Limiting instrumental ADL; medical management indicated [Urinary Frequency: Grade 1 - Present] : Urinary Frequency: Grade 1 - Present [Erectile Dysfunction: Grade 3 - Decrease in erectile function (frequency/rigidity of erections) but erectile intervention not helpful (e.g., medication or mechanical devices such as penile pump); placement of a permanent penile prosthesis indicated (not p] : Erectile Dysfunction: Grade 3 - Decrease in erectile function (frequency/rigidity of erections) but erectile intervention not helpful (e.g., medication or mechanical devices such as penile pump); placement of a permanent penile prosthesis indicated (not previously present)

## 2018-11-19 NOTE — PATIENT PROFILE ADULT. - NS PRO OT REFERRAL QUES 1 YN
[facility]  [address], [address] [facility]    OUTPATIENT physical Therapy DAILY Treatment NOTe with Discharge  Visit: 8    NAME: Dale Edwards AGE: 47 y.o. GENDER: male  DATE: 11/19/2018  REFERRING PHYSICIAN: Kelle Hogan NP      GOALS  Short term goals  Time frame: 3 weeks  1. Patient will be compliant and independent with the initial HEP as evidenced by being able to perform without cuing. 2. Patient will report a 25% improvement in symptoms. 7. Patient will tolerate 15 minutes of clinic activities before onset of symptoms. Long term goals  Time frame: 6 weeks  1. Patient will have an improved score on the CROWLEY outcome measure by 5 points to demonstrate an increase in functional activity tolerance. 2. Patient will be independent in final individualized HEP. 3. Patient will return to walking without being limited by symptoms. SUBJECTIVE:   \"I'm feeling good. No falls\"    Pain In: 0/10    OBJECTIVE DATA SUMMARY:   Objective data from initial evaluation:  Pain:  Location: right knee  Quality: aching  Now: 4/10  Factors that improve pain: rest    Posture:   Decreased WB on right LE    Strength:   RLE, able to resist 4/5 knee flexion and 3/5 extension    Range of Motion:   Not tested    Spinal Assessment:   WNL    Joint Mobility:   Not tested    Palpation:   Not TTP in right LE    Neurologic Assessment:   Tone: right LE ataxia   Sensation: WFL   Reflexes: not tested    Mobility:   Transitional Movements: definite use of hands    Gait: pt uses a st cane in left hand, drags right foot    Balance:  See CROWLEY balance assessment below    Functional Measure:   Crowley Balance Test: 39/56    In compliance with CMSs Claims Based Outcome Reporting, the following G-code set was chosen for this patient based on their primary functional limitation being treated:     The outcome measure chosen to determine the severity of the functional limitation was the CROWLEY with a score of 39/56 which was correlated with the impairment scale. ? Mobility - Walking and Moving Around:     - CURRENT STATUS: CJ - 20%-39% impaired, limited or restricted    - GOAL STATUS:  CI - 1%-19% impaired, limited or restricted    - D/C STATUS:  ---------------To be determined---------------     TREATMENT/INTERVENTION:  Modalities (Rationale): None this date    Therapeutic Exercises:  HEP: Standing knee flexion, standing neck flexion/ext and rotation, squats, bridges, SLR, toe raises, self mob right knee flexion/ext, right resisted shoulder abduction (yellow TB dispensed)  Added to HEP 10/31/18: Ankle circles, Ankle DF/PF/EV/INV with yellow TB    D/C HEP 2018  Knee bends, marches, hip abduction, hip extension, resisted side walks (blue band dispensed), step- ups, bridges, SLR, side lying hip abduction, resisted hip adduction, resisted hip abduction, SLS for balance challenge.     Exercises in BOLD performed this date:    LBE: 7 minutes; resistance 6    Standing   Right hip flexion (high knees) with walkin feet x 2  Squats: 20 reps, holding 5# kettle bell  Right hip extension: 2 sets of 10 reps with manual resistance  Heel raises/ toe raises x 8 reps  Lunges on to BOSU x 10 reps B  Side steps with green TB: 30 feet x 4  Side step up onto green step with 2 risers: 20 reps (no UE support)   Step ups onto green step with 2 risers: 15 reps B (no UE support)  Hip abduction and extension with green TB: 20 reps  Quick toe taps on step with 2 risers, no UE support: 15 reps B  Single leg Malay deadlifts with unilateral UE support: 10 reps B    Supine  Bridges x 10 reps 2 sets  SLR with hip abduction (up, out, in, down): 2 sets X 10 reps   BKTC yellow ball x 10 reps  Ankle DF/PF and inversion/eversion with yellow TB: 2 sets of 10 reps each  Ankle circles: 15 reps CW and CCW    Seated   Right UE abduction red TB  T's and X's red TB 10 reps  Hip Adduction squeeze using ball, 10 reps  Abduction red TB x 15 reps   Shoulder flexion 3# x 12 reps B  Knee flexion with yellow ball x 10 reps B    Neuromuscular Re-Education:  NBOS with head turns in all directions on green foam; no UE support; stand-by assistance  NBOS with eyes closed on green foam: 28 seconds  Star touches,10 reps B  Weight shifts on BOSU ball (blue side up); no UE support; stand-by to minimal assistance as needed  Trunk shifts seated on yellow ball (large) between railings  Tandem stance: 9 seconds (right leg in front); 30 seconds (left leg in front); no UE support  Tandem walkin feet x 2; stand-by assistance  SLS on level ground: 20 and 25 seconds on left leg; 6, 10, and 8 seconds on right leg; no UE support  SLS on foam: 9, 4, 7, 11, and 17 seconds on left leg; 2 seconds x 5 on right leg; no UE support  Forward and retro walking over uneven red mat (4 10 lb weights under mat): x 6    Gait Training:   Treadmill: 3 mins at 1.2 mph, 1 min at 1.3 mph; cues for heel strike, increasing stride length  Ambulation on level surface: 200 feet each time; improved carryover after treadmill training noted     Activity tolerance and post treatment pain report:   Good tolerance and participation  Pain Out: 0/10    Education:  Education was provided to the patient on the following topics: Importance of exercises. [x]    No changes were made to the home exercise program.  []    The following changes were made to the home exercise program  Patient verbalized understanding of the topics presented. ASSESSMENT:   Patient continues to present with good participation and motivation. Excellent adherence to HEP. Patient presents without cane today. He reports no falls and that his toe is no longer catching when he walks. Patient is progressing well with strength and balance following CVA 8 years ago.  Pt encouraged to look into work out options and continue exercises, he is ready for discharge from our service    Patients progression toward goals is as follows:  [x]     Improving appropriately and progressing toward goals  []     Improving slowly and progressing toward goals  []     Not making progress toward goals and plan of care will be adjusted    PLAN OF CARE:       []    Continue treatment per established plan of care.   [x]     Recommend the following changes to the plan of care: Discharge      Bobo Cisneros PT   Time Calculation: 20 mins  Patient Time in clinic:   Start Time: 1350   Stop Time: [phone] no

## 2018-12-07 ENCOUNTER — OUTPATIENT (OUTPATIENT)
Dept: OUTPATIENT SERVICES | Facility: HOSPITAL | Age: 63
LOS: 1 days | Discharge: HOME | End: 2018-12-07

## 2018-12-07 DIAGNOSIS — C61 MALIGNANT NEOPLASM OF PROSTATE: ICD-10-CM

## 2018-12-07 DIAGNOSIS — Z96.7 PRESENCE OF OTHER BONE AND TENDON IMPLANTS: Chronic | ICD-10-CM

## 2018-12-07 DIAGNOSIS — Z98.890 OTHER SPECIFIED POSTPROCEDURAL STATES: Chronic | ICD-10-CM

## 2018-12-07 NOTE — H&P PST ADULT - CIGARETTES, PACKS/DAY
Patient Instructions by Alex Stoner DO at 09/27/17 07:39 AM     Author:  Alex Stoner DO Service:  (none) Author Type:  Physician     Filed:  09/27/17 07:40 AM Encounter Date:  9/27/2017 Status:  Signed     :  Alex Stoner DO (Physician)            PATIENT INFORMATION   .    Follow-Up  -- Make an appointment with Alex Stoner DO in six months   -- Try over the counter loratidine (brand names claritin, alavert) or cetirizine (brand name zyrtec)  -- Flonase one spray twice daily to each nostril (point towards eyes)     Additional Educational Resources:  For additional resources regarding your symptoms, diagnosis, or further health information, please visit the Health Resources section on [email or web address] or the Online Health Resources section in [email or web address].            Revision History        User Key Date/Time User Provider Type Action    > [N/A] 09/27/17 07:40 AM Alex Stoner DO Physician Sign             1

## 2018-12-10 LAB
PSA FREE FLD-MCNC: NORMAL
PSA FREE SERPL-MCNC: <0.01 NG/ML
PSA SERPL-MCNC: <0.01 NG/ML

## 2018-12-12 ENCOUNTER — APPOINTMENT (OUTPATIENT)
Dept: INFUSION THERAPY | Facility: CLINIC | Age: 63
End: 2018-12-12

## 2018-12-12 LAB
TESTOST BND SERPL-MCNC: 0.6 PG/ML
TESTOST SERPL-MCNC: 7.6 NG/DL

## 2018-12-14 ENCOUNTER — APPOINTMENT (OUTPATIENT)
Dept: RADIATION ONCOLOGY | Facility: CLINIC | Age: 63
End: 2018-12-14
Payer: MEDICARE

## 2018-12-14 PROCEDURE — 99214 OFFICE O/P EST MOD 30 MIN: CPT

## 2018-12-17 ENCOUNTER — OUTPATIENT (OUTPATIENT)
Dept: OUTPATIENT SERVICES | Facility: HOSPITAL | Age: 63
LOS: 1 days | End: 2018-12-17
Payer: MEDICARE

## 2018-12-17 ENCOUNTER — APPOINTMENT (OUTPATIENT)
Dept: HEMATOLOGY ONCOLOGY | Facility: CLINIC | Age: 63
End: 2018-12-17
Payer: MEDICARE

## 2018-12-17 VITALS
OXYGEN SATURATION: 97 % | HEIGHT: 66 IN | SYSTOLIC BLOOD PRESSURE: 110 MMHG | BODY MASS INDEX: 26.52 KG/M2 | HEART RATE: 77 BPM | DIASTOLIC BLOOD PRESSURE: 71 MMHG | WEIGHT: 165 LBS | TEMPERATURE: 98.9 F

## 2018-12-17 DIAGNOSIS — Z98.890 OTHER SPECIFIED POSTPROCEDURAL STATES: Chronic | ICD-10-CM

## 2018-12-17 DIAGNOSIS — Z96.7 PRESENCE OF OTHER BONE AND TENDON IMPLANTS: Chronic | ICD-10-CM

## 2018-12-17 DIAGNOSIS — C61 MALIGNANT NEOPLASM OF PROSTATE: ICD-10-CM

## 2018-12-17 PROCEDURE — 99214 OFFICE O/P EST MOD 30 MIN: CPT

## 2018-12-17 PROCEDURE — 73060 X-RAY EXAM OF HUMERUS: CPT | Mod: 26,RT

## 2018-12-17 NOTE — REVIEW OF SYSTEMS
[Negative] : Allergic/Immunologic [Fatigue] : fatigue [Muscle Weakness] : muscle weakness [FreeTextEntry2] : hot flashes [de-identified] : ED

## 2018-12-17 NOTE — ASSESSMENT
[FreeTextEntry1] : Very- high-risk prostate cancer, [place] 9, S/P radical prostatectomy and external beam RT for positive seminal vesicle margin. T4N0.\par Patient is now discontinuing treatment of Lupron (ADT)  because he is not tolerating side-effects, (metabolic syndrome/ED).\par Plan is to continue to monitor PSA and follow-up in 3 months.

## 2018-12-17 NOTE — RESULTS/DATA
[FreeTextEntry1] : WBC- 4.7, HGb-13.8, HCT-37.8, Plat-235,000, ANC- 2.7\par  CMP -- Glucose 115\par  PSA- < 0.01

## 2018-12-17 NOTE — HISTORY OF PRESENT ILLNESS
[de-identified] : This 64 y/o male saw Dr. García for PSA elevation from 5 in 2017 to 9 this March. Exam showed a hard stony prostate, and transrectal ultrasound-guided biopsy confirmed [place] 9 adenoCa prostate with perineural invasion.\par Robotic-assisted radical prostatectomy with Dr. Mckenna at [facility] on 3/14/18 - path identical to original, with Aditya 9 prostate adenCa with extensive perineural invasion, sravani-prostatic spread in soft tissue surrounding seminal vesicles, bilateral extensive seminal vesicle involvement and tumor within a few cell layers of bladder base.\par Soft tissue margins of both seminal vesicles were positive. 4 pelvic nodes were negative.\par Post-op PSA on 4/10/18 was 1.57.\par  Started on Lupron in April 2018 and Radiation in may 2018 [de-identified] : The patient returns today for his 4 month follow-up.  He has been on Lupron since April and is not currently tolerating side-effects well. Fatigue/muscle weakness/sexual dysfunction are all related to ADT, and today Jaden has chosen to discontinue Lupron, short of 1 year of therapy.

## 2019-01-09 ENCOUNTER — OTHER (OUTPATIENT)
Age: 64
End: 2019-01-09

## 2019-02-18 ENCOUNTER — LABORATORY RESULT (OUTPATIENT)
Age: 64
End: 2019-02-18

## 2019-03-01 ENCOUNTER — OUTPATIENT (OUTPATIENT)
Dept: OUTPATIENT SERVICES | Facility: HOSPITAL | Age: 64
LOS: 1 days | Discharge: HOME | End: 2019-03-01

## 2019-03-01 DIAGNOSIS — C61 MALIGNANT NEOPLASM OF PROSTATE: ICD-10-CM

## 2019-03-01 DIAGNOSIS — Z98.890 OTHER SPECIFIED POSTPROCEDURAL STATES: Chronic | ICD-10-CM

## 2019-03-01 DIAGNOSIS — Z96.7 PRESENCE OF OTHER BONE AND TENDON IMPLANTS: Chronic | ICD-10-CM

## 2019-03-04 ENCOUNTER — APPOINTMENT (OUTPATIENT)
Dept: UROLOGY | Facility: CLINIC | Age: 64
End: 2019-03-04
Payer: MEDICARE

## 2019-03-04 VITALS — HEART RATE: 74 BPM | SYSTOLIC BLOOD PRESSURE: 154 MMHG | DIASTOLIC BLOOD PRESSURE: 89 MMHG

## 2019-03-04 PROCEDURE — 99213 OFFICE O/P EST LOW 20 MIN: CPT

## 2019-03-05 ENCOUNTER — APPOINTMENT (OUTPATIENT)
Dept: RADIATION ONCOLOGY | Facility: CLINIC | Age: 64
End: 2019-03-05
Payer: MEDICARE

## 2019-03-05 VITALS
BODY MASS INDEX: 26.84 KG/M2 | WEIGHT: 167 LBS | DIASTOLIC BLOOD PRESSURE: 96 MMHG | OXYGEN SATURATION: 98 % | TEMPERATURE: 97.6 F | RESPIRATION RATE: 16 BRPM | HEIGHT: 66 IN | HEART RATE: 68 BPM | SYSTOLIC BLOOD PRESSURE: 156 MMHG

## 2019-03-05 PROCEDURE — 99214 OFFICE O/P EST MOD 30 MIN: CPT

## 2019-03-05 RX ORDER — CEPHALEXIN 500 MG/1
500 CAPSULE ORAL
Qty: 20 | Refills: 0 | Status: COMPLETED | COMMUNITY
Start: 2019-01-18

## 2019-03-05 NOTE — REVIEW OF SYSTEMS
[Anal Pain: Grade 0] : Anal Pain: Grade 0 [Constipation: Grade 0] : Constipation: Grade 0 [Diarrhea: Grade 0] : Diarrhea: Grade 0 [Dyspepsia: Grade 0] : Dyspepsia: Grade 0 [Fecal Incontinence: Grade 0] : Fecal Incontinence: Grade 0 [Nausea: Grade 0] : Nausea: Grade 0 [Proctitis: Grade 0] : Proctitis: Grade 0 [Rectal Pain: Grade 0] : Rectal Pain: Grade 0 [Hematuria: Grade 0] : Hematuria: Grade 0 [Urinary Incontinence: Grade 1 - Occasional (e.g., with coughing, sneezing, etc.), pads not indicated] : Urinary Incontinence: Grade 1 - Occasional (e.g., with coughing, sneezing, etc.), pads not indicated [Urinary Retention: Grade 0] : Urinary Retention: Grade 0 [Urinary Tract Pain: Grade 0] : Urinary Tract Pain: Grade 0 [Urinary Urgency: Grade 0] : Urinary Urgency: Grade 0 [Urinary Frequency: Grade 0] : Urinary Frequency: Grade 0 [Erectile Dysfunction: Grade 3 - Decrease in erectile function (frequency/rigidity of erections) but erectile intervention not helpful (e.g., medication or mechanical devices such as penile pump); placement of a permanent penile prosthesis indicated (not p] : Erectile Dysfunction: Grade 3 - Decrease in erectile function (frequency/rigidity of erections) but erectile intervention not helpful (e.g., medication or mechanical devices such as penile pump); placement of a permanent penile prosthesis indicated (not previously present) [Ejaculation Disorder: Grade 2 - Anejaculation or retrograde ejaculation] : Ejaculation Disorder: Grade 2 - Anejaculation or retrograde ejaculation

## 2019-03-05 NOTE — DISEASE MANAGEMENT
[2] : T2 [0-10] : 0 -10 ng/mL [] : Patient had a Prostate MRI [Extracapsular Extension] : Extracapsular extension [IIIC] : IIIC [Radical Prostatectomy] : Radical Prostatectomy [Radiation Therapy] : Radiation Therapy [Patient had a radical prostatectomy] : Patient had a radical prostatectomy  [9] : [place] Score 9 [0] : M0 [Treatment with radiation therapy] : Treatment with radiation therapy [EBRT] : EBRT [Treatment with androgen ablation] : Treatment with androgen ablation [BiopsyDate] : 1/18 [MeasuredProstateVolume] : 34.2 [TotalCores] : 12 [TotalPositiveCores] : 12 [MaxCoreInvolvement] : 100 [CTresults] : no adenopathy or evidence of metastasis [BoneScanResults] : no evidence of metastasis [RadicalProstatectomyDate] : 3/6/18 [TotalNumberofnodesresected] : 4 [PositiveNodes] : 0 [RadiationCompletedDate] : 6/27/18 [EBRTDose] : 7011 [EBRTFractions] : 39 [ADTStartedDate] : 4/18 [ADTDuration] : 7 months [ADTCompletedDate] : 11/18

## 2019-03-05 NOTE — HISTORY OF PRESENT ILLNESS
[FreeTextEntry1] : 63 year old male returns today for follow up of prostate adenocarcinoma (Aditya 5+4 = 9, PNI, +SV/+bladder neck), status post radical prostatectomy and lymph node sampling on 3/17/18, with post-operative PSA of 1.57 ng/mL on 4/10/18, followed by adjuvant radiation to the prostate bed/pelvis 70.2 Gy completing 6/27/18. \par \par He discontinued androgen deprivation therapy in 11/2018 due to poor tolerance of side effects (last 3-month depot injection on 8/14/18).\par \par Stable 2x nocturia; denies dysuria, urgency, hematuria, dribbling, hesitancy, bowel, bony, fatigue or weight loss.\par \par For his erectile dysfunction, previously tried sildenafil with adverse side effect. He was evaluated by Dr. Richard, and is currently trying Tri-Mix self-injections with mixed results.\par \par He reports interval improvement of his energy and muscle strength since discontinuing ADT.\par \par IPSS today was 7; EPIC 16.

## 2019-03-10 NOTE — HISTORY OF PRESENT ILLNESS
[FreeTextEntry1] : Mr. Singleton presents today for follow-up appt. s/p RALP 3/6/18. Pt reports feeling well however reports ED. Recently   PSA <0.01 and he has ADT

## 2019-03-10 NOTE — PHYSICAL EXAM
[General Appearance - Well Developed] : well developed [General Appearance - Well Nourished] : well nourished [Normal Appearance] : normal appearance [Well Groomed] : well groomed [General Appearance - In No Acute Distress] : no acute distress [Abdomen Soft] : soft [Abdomen Tenderness] : non-tender [Costovertebral Angle Tenderness] : no ~M costovertebral angle tenderness [Urethral Meatus] : meatus normal [Urinary Bladder Findings] : the bladder was normal on palpation [Scrotum] : the scrotum was normal [Testes Mass (___cm)] : there were no testicular masses [Edema] : no peripheral edema [] : no respiratory distress [Respiration, Rhythm And Depth] : normal respiratory rhythm and effort [Exaggerated Use Of Accessory Muscles For Inspiration] : no accessory muscle use [Oriented To Time, Place, And Person] : oriented to person, place, and time [Affect] : the affect was normal [Mood] : the mood was normal [Not Anxious] : not anxious [Normal Station and Gait] : the gait and station were normal for the patient's age [No Focal Deficits] : no focal deficits [No Palpable Adenopathy] : no palpable adenopathy [FreeTextEntry1] : firm hard prostate through out

## 2019-03-10 NOTE — ASSESSMENT
[FreeTextEntry1] : We discussed treatment andf continued followup with PSA.   He is on PDE5 inhibitors therapy for ED and he will see Dr García or Jose Manuel to discuss possible injection Therapyr

## 2019-03-12 ENCOUNTER — RESULT REVIEW (OUTPATIENT)
Age: 64
End: 2019-03-12

## 2019-03-12 ENCOUNTER — APPOINTMENT (OUTPATIENT)
Dept: HEMATOLOGY ONCOLOGY | Facility: CLINIC | Age: 64
End: 2019-03-12
Payer: MEDICARE

## 2019-03-12 VITALS
HEART RATE: 68 BPM | DIASTOLIC BLOOD PRESSURE: 97 MMHG | WEIGHT: 168.02 LBS | TEMPERATURE: 98.1 F | SYSTOLIC BLOOD PRESSURE: 159 MMHG | HEIGHT: 66 IN | BODY MASS INDEX: 27 KG/M2 | OXYGEN SATURATION: 95 %

## 2019-03-12 LAB
BASOPHILS # BLD AUTO: 0.1 K/UL — SIGNIFICANT CHANGE UP (ref 0–0.2)
BASOPHILS NFR BLD AUTO: 1.1 % — SIGNIFICANT CHANGE UP (ref 0–2)
EOSINOPHIL # BLD AUTO: 0.3 K/UL — SIGNIFICANT CHANGE UP (ref 0–0.5)
EOSINOPHIL NFR BLD AUTO: 4.8 % — SIGNIFICANT CHANGE UP (ref 0–6)
HCT VFR BLD CALC: 42.1 % — SIGNIFICANT CHANGE UP (ref 39–50)
HGB BLD-MCNC: 14.2 G/DL — SIGNIFICANT CHANGE UP (ref 13–17)
LYMPHOCYTES # BLD AUTO: 0.8 K/UL — LOW (ref 1–3.3)
LYMPHOCYTES # BLD AUTO: 13 % — SIGNIFICANT CHANGE UP (ref 13–44)
MCHC RBC-ENTMCNC: 30.3 PG — SIGNIFICANT CHANGE UP (ref 27–34)
MCHC RBC-ENTMCNC: 33.8 G/DL — SIGNIFICANT CHANGE UP (ref 32–36)
MCV RBC AUTO: 89.6 FL — SIGNIFICANT CHANGE UP (ref 80–100)
MONOCYTES # BLD AUTO: 0.5 K/UL — SIGNIFICANT CHANGE UP (ref 0–0.9)
MONOCYTES NFR BLD AUTO: 8.9 % — SIGNIFICANT CHANGE UP (ref 2–14)
NEUTROPHILS # BLD AUTO: 4.2 K/UL — SIGNIFICANT CHANGE UP (ref 1.8–7.4)
NEUTROPHILS NFR BLD AUTO: 72.3 % — SIGNIFICANT CHANGE UP (ref 43–77)
PLATELET # BLD AUTO: 244 K/UL — SIGNIFICANT CHANGE UP (ref 150–400)
RBC # BLD: 4.69 M/UL — SIGNIFICANT CHANGE UP (ref 4.2–5.8)
RBC # FLD: 11.3 % — SIGNIFICANT CHANGE UP (ref 10.3–14.5)
WBC # BLD: 5.9 K/UL — SIGNIFICANT CHANGE UP (ref 3.8–10.5)
WBC # FLD AUTO: 5.9 K/UL — SIGNIFICANT CHANGE UP (ref 3.8–10.5)

## 2019-03-12 PROCEDURE — 99214 OFFICE O/P EST MOD 30 MIN: CPT

## 2019-03-13 LAB
ALBUMIN SERPL ELPH-MCNC: 4.6 G/DL
ALP BLD-CCNC: 103 U/L
ALT SERPL-CCNC: 19 U/L
ANION GAP SERPL CALC-SCNC: 11 MMOL/L
AST SERPL-CCNC: 22 U/L
BILIRUB SERPL-MCNC: 0.9 MG/DL
BUN SERPL-MCNC: 21 MG/DL
CALCIUM SERPL-MCNC: 9.9 MG/DL
CHLORIDE SERPL-SCNC: 101 MMOL/L
CO2 SERPL-SCNC: 26 MMOL/L
CREAT SERPL-MCNC: 0.98 MG/DL
GLUCOSE SERPL-MCNC: 97 MG/DL
POTASSIUM SERPL-SCNC: 4.9 MMOL/L
PROT SERPL-MCNC: 6.8 G/DL
PSA SERPL-MCNC: <0.01 NG/ML
SODIUM SERPL-SCNC: 138 MMOL/L

## 2019-03-25 NOTE — ASSESSMENT
[FreeTextEntry1] : Adenocarcinoma of the prostate, Aditya 9, S/P radical prostatectomy and external beam RT for positive seminal vesicle margin. T4N0.\par Patient is now discontinuing treatment of Lupron (ADT) because he is not tolerating side-effects (metabolic syndrome/ED).\par \par Plan:\par -Continue to monitor PSA and follow-up in 3 months.

## 2019-03-25 NOTE — HISTORY OF PRESENT ILLNESS
[de-identified] : Mr. Singleton is a 63 year old male who present for a follow up visit for prostate cancer. He saw Dr. García for PSA elevation from 5 in 2017 to 9 in March 2018. Exam showed a hard stony prostate, and transrectal ultrasound-guided biopsy confirmed [place] 9 adenoCa of the prostate with perineural invasion. He is s/p robotic-assisted radical prostatectomy with Dr. Mckenna at [facility] on 3/14/18, with pathology identical to original: Aditya 9 prostate adenCa with extensive perineural invasion, sravani-prostatic spread in soft tissue surrounding seminal vesicles, bilateral extensive seminal vesicle involvement and tumor within a few cell layers of bladder base. Soft tissue margins of both seminal vesicles were positive, 4 pelvic nodes were negative. Post-op PSA on 4/10/18 was 1.57. Started on Lupron in April 2018 and Radiation in May 2018. Due to intolerable side effects (fatigue, muscle weakness, sexual dysfunction) Jaden decided to hold Lupron in December 2018, at which point his PSA was <0.01. [de-identified] : His PSA has remained at <0.01 (2/18/19)\par He states that he has been feeling better since off Lupron\par He reports his energy levels have improved\par He notes that he still has ED

## 2019-03-25 NOTE — ADDENDUM
[FreeTextEntry1] : All medical record entries made by hailey Esquivel were at Dr. Pipe Landers's direction and personally dictated by me on (3/12/2019).

## 2019-05-06 ENCOUNTER — APPOINTMENT (OUTPATIENT)
Dept: UROLOGY | Facility: CLINIC | Age: 64
End: 2019-05-06
Payer: MEDICARE

## 2019-05-06 VITALS
HEIGHT: 66 IN | BODY MASS INDEX: 26.52 KG/M2 | HEART RATE: 78 BPM | SYSTOLIC BLOOD PRESSURE: 148 MMHG | RESPIRATION RATE: 15 BRPM | WEIGHT: 165 LBS | DIASTOLIC BLOOD PRESSURE: 93 MMHG

## 2019-05-06 PROCEDURE — 99213 OFFICE O/P EST LOW 20 MIN: CPT

## 2019-05-06 NOTE — PHYSICAL EXAM
[Normal Appearance] : normal appearance [General Appearance - Well Developed] : well developed [General Appearance - Well Nourished] : well nourished [General Appearance - In No Acute Distress] : no acute distress [Well Groomed] : well groomed [Abdomen Tenderness] : non-tender [Costovertebral Angle Tenderness] : no ~M costovertebral angle tenderness [Edema] : no peripheral edema [Respiration, Rhythm And Depth] : normal respiratory rhythm and effort [] : no respiratory distress [Exaggerated Use Of Accessory Muscles For Inspiration] : no accessory muscle use [Oriented To Time, Place, And Person] : oriented to person, place, and time [Affect] : the affect was normal [Mood] : the mood was normal [Not Anxious] : not anxious [Normal Station and Gait] : the gait and station were normal for the patient's age

## 2019-05-06 NOTE — LETTER BODY
[Dear  ___] : Dear  [unfilled], [Courtesy Letter:] : I had the pleasure of seeing your patient, [unfilled], in my office today. [Sincerely,] : Sincerely, [Please see my note below.] : Please see my note below. [FreeTextEntry3] : Ed\par \par Feroz Richard MD\par [facility]\par  of Urology\par [employer] [facility]\par

## 2019-05-06 NOTE — ASSESSMENT
[FreeTextEntry1] : Impression:\par \par prostate cancer, JOSE\par erectile dysfunction, \par \par Plan:\par \par increase tri-mix to higher dose.\par follow up three months

## 2019-05-06 NOTE — HISTORY OF PRESENT ILLNESS
[FreeTextEntry1] : patient had robotic prostatectomy and post op radiation. tried tri-mix at standard dose.  initially good but has not worked much lately. on standard dose.

## 2019-06-03 ENCOUNTER — APPOINTMENT (OUTPATIENT)
Dept: UROLOGY | Facility: CLINIC | Age: 64
End: 2019-06-03
Payer: MEDICARE

## 2019-06-03 VITALS — DIASTOLIC BLOOD PRESSURE: 73 MMHG | SYSTOLIC BLOOD PRESSURE: 132 MMHG | HEART RATE: 76 BPM

## 2019-06-03 PROCEDURE — 99213 OFFICE O/P EST LOW 20 MIN: CPT

## 2019-06-04 LAB
PSA FREE FLD-MCNC: NORMAL %
PSA FREE SERPL-MCNC: <0.01 NG/ML
PSA SERPL-MCNC: 0.08 NG/ML
TESTOST BND SERPL-MCNC: 3.5 PG/ML
TESTOST SERPL-MCNC: 353 NG/DL

## 2019-06-11 ENCOUNTER — OUTPATIENT (OUTPATIENT)
Dept: OUTPATIENT SERVICES | Facility: HOSPITAL | Age: 64
LOS: 1 days | Discharge: HOME | End: 2019-06-11

## 2019-06-11 DIAGNOSIS — Z98.890 OTHER SPECIFIED POSTPROCEDURAL STATES: Chronic | ICD-10-CM

## 2019-06-11 DIAGNOSIS — Z96.7 PRESENCE OF OTHER BONE AND TENDON IMPLANTS: Chronic | ICD-10-CM

## 2019-06-11 DIAGNOSIS — C61 MALIGNANT NEOPLASM OF PROSTATE: ICD-10-CM

## 2019-06-11 NOTE — HISTORY OF PRESENT ILLNESS
[FreeTextEntry1] : He has RALP and high risk disease\par Received radiation and HT but stopped HT \par PSA 0.08 from Mat 31, 2019\par Total testosterone 353

## 2019-06-11 NOTE — PHYSICAL EXAM
[General Appearance - Well Developed] : well developed [General Appearance - Well Nourished] : well nourished [Normal Appearance] : normal appearance [Well Groomed] : well groomed [General Appearance - In No Acute Distress] : no acute distress [Abdomen Soft] : soft [Abdomen Tenderness] : non-tender [Costovertebral Angle Tenderness] : no ~M costovertebral angle tenderness [Urinary Bladder Findings] : the bladder was normal on palpation [Urethral Meatus] : meatus normal [Scrotum] : the scrotum was normal [Testes Mass (___cm)] : there were no testicular masses [Edema] : no peripheral edema [] : no respiratory distress [Respiration, Rhythm And Depth] : normal respiratory rhythm and effort [Exaggerated Use Of Accessory Muscles For Inspiration] : no accessory muscle use [Oriented To Time, Place, And Person] : oriented to person, place, and time [Affect] : the affect was normal [Not Anxious] : not anxious [Mood] : the mood was normal [Normal Station and Gait] : the gait and station were normal for the patient's age [No Focal Deficits] : no focal deficits [No Palpable Adenopathy] : no palpable adenopathy [FreeTextEntry1] : firm hard prostate through out

## 2019-06-13 ENCOUNTER — RESULT REVIEW (OUTPATIENT)
Age: 64
End: 2019-06-13

## 2019-06-13 ENCOUNTER — APPOINTMENT (OUTPATIENT)
Dept: HEMATOLOGY ONCOLOGY | Facility: CLINIC | Age: 64
End: 2019-06-13
Payer: MEDICARE

## 2019-06-13 VITALS
HEIGHT: 66 IN | BODY MASS INDEX: 26.36 KG/M2 | OXYGEN SATURATION: 96 % | WEIGHT: 164 LBS | HEART RATE: 70 BPM | DIASTOLIC BLOOD PRESSURE: 84 MMHG | SYSTOLIC BLOOD PRESSURE: 135 MMHG

## 2019-06-13 LAB
BASOPHILS # BLD AUTO: 0.1 K/UL — SIGNIFICANT CHANGE UP (ref 0–0.2)
BASOPHILS NFR BLD AUTO: 0.9 % — SIGNIFICANT CHANGE UP (ref 0–2)
EOSINOPHIL # BLD AUTO: 0.2 K/UL — SIGNIFICANT CHANGE UP (ref 0–0.5)
EOSINOPHIL NFR BLD AUTO: 2.4 % — SIGNIFICANT CHANGE UP (ref 0–6)
HCT VFR BLD CALC: 41.8 % — SIGNIFICANT CHANGE UP (ref 39–50)
HGB BLD-MCNC: 14.1 G/DL — SIGNIFICANT CHANGE UP (ref 13–17)
LYMPHOCYTES # BLD AUTO: 0.9 K/UL — LOW (ref 1–3.3)
LYMPHOCYTES # BLD AUTO: 13.6 % — SIGNIFICANT CHANGE UP (ref 13–44)
MCHC RBC-ENTMCNC: 29.6 PG — SIGNIFICANT CHANGE UP (ref 27–34)
MCHC RBC-ENTMCNC: 33.8 G/DL — SIGNIFICANT CHANGE UP (ref 32–36)
MCV RBC AUTO: 87.6 FL — SIGNIFICANT CHANGE UP (ref 80–100)
MONOCYTES # BLD AUTO: 0.5 K/UL — SIGNIFICANT CHANGE UP (ref 0–0.9)
MONOCYTES NFR BLD AUTO: 7.5 % — SIGNIFICANT CHANGE UP (ref 2–14)
NEUTROPHILS # BLD AUTO: 5.2 K/UL — SIGNIFICANT CHANGE UP (ref 1.8–7.4)
NEUTROPHILS NFR BLD AUTO: 75.5 % — SIGNIFICANT CHANGE UP (ref 43–77)
PLATELET # BLD AUTO: 312 K/UL — SIGNIFICANT CHANGE UP (ref 150–400)
RBC # BLD: 4.77 M/UL — SIGNIFICANT CHANGE UP (ref 4.2–5.8)
RBC # FLD: 11.4 % — SIGNIFICANT CHANGE UP (ref 10.3–14.5)
WBC # BLD: 6.8 K/UL — SIGNIFICANT CHANGE UP (ref 3.8–10.5)
WBC # FLD AUTO: 6.8 K/UL — SIGNIFICANT CHANGE UP (ref 3.8–10.5)

## 2019-06-13 PROCEDURE — 99215 OFFICE O/P EST HI 40 MIN: CPT

## 2019-06-17 LAB
ALBUMIN SERPL ELPH-MCNC: 4.8 G/DL
ALP BLD-CCNC: 109 U/L
ALT SERPL-CCNC: 22 U/L
ANION GAP SERPL CALC-SCNC: 14 MMOL/L
AST SERPL-CCNC: 23 U/L
BILIRUB SERPL-MCNC: 0.7 MG/DL
BUN SERPL-MCNC: 23 MG/DL
CALCIUM SERPL-MCNC: 9.8 MG/DL
CHLORIDE SERPL-SCNC: 99 MMOL/L
CO2 SERPL-SCNC: 25 MMOL/L
CREAT SERPL-MCNC: 0.94 MG/DL
GLUCOSE SERPL-MCNC: 98 MG/DL
POTASSIUM SERPL-SCNC: 4.5 MMOL/L
PROT SERPL-MCNC: 6.8 G/DL
SODIUM SERPL-SCNC: 138 MMOL/L

## 2019-06-17 NOTE — ASSESSMENT
[FreeTextEntry1] : Adenocarcinoma of the prostate, Artie 9, S/P radical prostatectomy and external beam RT for positive seminal vesicle margin. S/P Lupron ADT from April to December 2018, stopped due to intolerable side-effects (metabolic syndrome/ED). Recent PSA (6/4/19) shows slight elevation, 0.08 compared to <0.01 about three months prior.\par \par Plan:\par -Continue to monitor PSA with Dr. Mckenna\par -Follow up in 6 months

## 2019-06-17 NOTE — HISTORY OF PRESENT ILLNESS
[de-identified] : Mr. Singleton is a 64 year old male following up for prostate cancer. He saw Dr. García for PSA elevation from 5 in 2017 to 9 in March 2018. 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 He is s/p robotic-assisted radical prostatectomy with Dr. Mckenna at [facility] on 3/14/18, with pathology identical to original: [place] 9 prostate adenCa with extensive perineural invasion, sravani-prostatic spread in soft tissue surrounding seminal vesicles, bilateral extensive seminal vesicle involvement and tumor within a few cell layers of bladder base. Soft tissue margins of both seminal vesicles were positive, 4 pelvic nodes were negative. Post-op PSA on 4/10/18 was 1.57. Started on Lupron in April 2018 and treated with external beam radiation in May 2018. Due to intolerable side effects (fatigue, muscle weakness, sexual dysfunction) Jaden decided to hold Lupron in December 2018, at which point his PSA was <0.01. He continues under surveillance with PSA monitoring. [de-identified] : Feeling well overall.\par Has fatigue, but attributes this to weight lifting.\par Sees Dr. Mckenna every 3 months. Wishes to reduce doctor visit frequency.\par \par Last PSA: 0.08 ng/mL on 6/4/19\par Previously <0.01 on 3/12/19\par \par

## 2019-06-17 NOTE — ADDENDUM
[FreeTextEntry1] : Documented by Carlos Esquivel acting as scribe for Dr. Pipe Landers on 6/13/2019.\par \par All medical record entries made by the Scribe were at my (Dr. Pipe Landers's) direction and personally dictated by me on aforementioned date. I have reviewed the chart and agree that the record accurately reflects my personal performance of the history, physical exam, assessment and plan. I have also personally directed, reviewed, and agree with the discharge instructions.

## 2019-07-09 ENCOUNTER — APPOINTMENT (OUTPATIENT)
Dept: RADIATION ONCOLOGY | Facility: CLINIC | Age: 64
End: 2019-07-09

## 2019-08-20 ENCOUNTER — APPOINTMENT (OUTPATIENT)
Dept: UROLOGY | Facility: CLINIC | Age: 64
End: 2019-08-20

## 2019-09-04 ENCOUNTER — APPOINTMENT (OUTPATIENT)
Dept: UROLOGY | Facility: CLINIC | Age: 64
End: 2019-09-04
Payer: MEDICARE

## 2019-09-04 DIAGNOSIS — N52.9 MALE ERECTILE DYSFUNCTION, UNSPECIFIED: ICD-10-CM

## 2019-09-04 PROCEDURE — 99213 OFFICE O/P EST LOW 20 MIN: CPT

## 2019-09-04 NOTE — HISTORY OF PRESENT ILLNESS
[FreeTextEntry1] : pt reports past month or so he thinks he notices urine or water like liquid come after he performs. Pt had radical prostatectomy and radiation in past for prostate Ca. He is now taking tri-mix for ED. He doesn't withdraw back needle to see if blood comes before injection. Denies any dysuria, hematuria.  no drainage of medicine from penis.\par gets good erections

## 2019-09-04 NOTE — REVIEW OF SYSTEMS
[Negative] : Psychiatric [Dysuria] : no dysuria [Hesitancy] : no urinary hesitancy [Testicular Pain] : no testicular pain

## 2019-09-04 NOTE — PHYSICAL EXAM
[General Appearance - Well Developed] : well developed [Normal Appearance] : normal appearance [General Appearance - Well Nourished] : well nourished [Well Groomed] : well groomed [General Appearance - In No Acute Distress] : no acute distress [Abdomen Soft] : soft [Costovertebral Angle Tenderness] : no ~M costovertebral angle tenderness [Abdomen Tenderness] : non-tender [Edema] : no peripheral edema [] : no respiratory distress [Respiration, Rhythm And Depth] : normal respiratory rhythm and effort [Exaggerated Use Of Accessory Muscles For Inspiration] : no accessory muscle use [Normal Station and Gait] : the gait and station were normal for the patient's age [No Focal Deficits] : no focal deficits

## 2019-09-04 NOTE — ASSESSMENT
[FreeTextEntry1] : Erectile dysfunction from prostatectomy and XRT\par \par Plan:\par \par recommend sudafed before intercourse\par may not work and patient was counseled about this. \par follow up 6 months.\par

## 2019-09-16 ENCOUNTER — APPOINTMENT (OUTPATIENT)
Dept: UROLOGY | Facility: CLINIC | Age: 64
End: 2019-09-16
Payer: MEDICARE

## 2019-09-16 VITALS — SYSTOLIC BLOOD PRESSURE: 141 MMHG | DIASTOLIC BLOOD PRESSURE: 87 MMHG | HEART RATE: 71 BPM

## 2019-09-16 PROCEDURE — 99213 OFFICE O/P EST LOW 20 MIN: CPT

## 2019-09-21 LAB — PSA SERPL-MCNC: 0.39 NG/ML

## 2019-09-21 NOTE — ASSESSMENT
[FreeTextEntry1] : 63 yo M - high risk prostate cancer s/p RALP, RT and 6 months ADT (Stopped ADT Dec2018)\par \par PSA - rising; up to 0.34 from 0.08\par Testosterone - 330 last time (May 2019)\par Continence - no problems\par Erections - on treatment, working well\par \par Discussed with patient rising PSA and likely need for ADT to resume. He and his wife understood the situation. LIkely he has normal testosterone levels but we will check them again along with PSA repeat. If still castrate sensitive then patient should be restarted on ADT. \par \par Plan - \par 1. testosterone and PSA levels repeat\par 2. follow-up 1 month with results\par 3. make appointment to see Dr. Landers sooner than planned to plan to resume ADT if appropriate \par

## 2019-09-21 NOTE — PHYSICAL EXAM
[General Appearance - Well Developed] : well developed [General Appearance - Well Nourished] : well nourished [Normal Appearance] : normal appearance [Well Groomed] : well groomed [General Appearance - In No Acute Distress] : no acute distress [Abdomen Soft] : soft [Costovertebral Angle Tenderness] : no ~M costovertebral angle tenderness [Abdomen Tenderness] : non-tender [Urethral Meatus] : meatus normal [Urinary Bladder Findings] : the bladder was normal on palpation [Scrotum] : the scrotum was normal [Testes Mass (___cm)] : there were no testicular masses [FreeTextEntry1] : firm hard prostate through out [Edema] : no peripheral edema [Respiration, Rhythm And Depth] : normal respiratory rhythm and effort [] : no respiratory distress [Oriented To Time, Place, And Person] : oriented to person, place, and time [Exaggerated Use Of Accessory Muscles For Inspiration] : no accessory muscle use [Affect] : the affect was normal [Mood] : the mood was normal [Not Anxious] : not anxious [Normal Station and Gait] : the gait and station were normal for the patient's age [No Focal Deficits] : no focal deficits [No Palpable Adenopathy] : no palpable adenopathy

## 2019-09-21 NOTE — HISTORY OF PRESENT ILLNESS
[None] : no symptoms [Erectile Dysfunction] : Erectile Dysfunction [FreeTextEntry1] : \par \par He has RALP (03/2018) and high risk disease - chanelle 9\par Received radiation (05-06/2019) and HT but stopped HT \par Stopped ADT/HT because of side effects\par \par PSA (09/2019) - 0.34 \par -previous PSA 0.08 from May 31, 2019\par Total testosterone 353 (May 2019)\par \par Clinically doing well and feels better off ADT. Followed by Dr. Landers.

## 2019-09-23 LAB
TESTOST BND SERPL-MCNC: 4.8 PG/ML
TESTOST SERPL-MCNC: 407 NG/DL

## 2019-12-08 ENCOUNTER — OUTPATIENT (OUTPATIENT)
Dept: OUTPATIENT SERVICES | Facility: HOSPITAL | Age: 64
LOS: 1 days | Discharge: HOME | End: 2019-12-08

## 2019-12-08 DIAGNOSIS — Z98.890 OTHER SPECIFIED POSTPROCEDURAL STATES: Chronic | ICD-10-CM

## 2019-12-08 DIAGNOSIS — C61 MALIGNANT NEOPLASM OF PROSTATE: ICD-10-CM

## 2019-12-08 DIAGNOSIS — Z96.7 PRESENCE OF OTHER BONE AND TENDON IMPLANTS: Chronic | ICD-10-CM

## 2019-12-16 ENCOUNTER — APPOINTMENT (OUTPATIENT)
Dept: HEMATOLOGY ONCOLOGY | Facility: CLINIC | Age: 64
End: 2019-12-16

## 2019-12-16 ENCOUNTER — RESULT REVIEW (OUTPATIENT)
Age: 64
End: 2019-12-16

## 2019-12-16 LAB
ALBUMIN SERPL ELPH-MCNC: 4.9 G/DL
ALP BLD-CCNC: 100 U/L
ALT SERPL-CCNC: 23 U/L
ANION GAP SERPL CALC-SCNC: 13 MMOL/L
AST SERPL-CCNC: 23 U/L
BASOPHILS # BLD AUTO: 0.1 K/UL — SIGNIFICANT CHANGE UP (ref 0–0.2)
BASOPHILS NFR BLD AUTO: 1.2 % — SIGNIFICANT CHANGE UP (ref 0–2)
BILIRUB SERPL-MCNC: 0.6 MG/DL
BUN SERPL-MCNC: 19 MG/DL
CALCIUM SERPL-MCNC: 9.4 MG/DL
CHLORIDE SERPL-SCNC: 104 MMOL/L
CO2 SERPL-SCNC: 24 MMOL/L
CREAT SERPL-MCNC: 1.25 MG/DL
EOSINOPHIL # BLD AUTO: 0.1 K/UL — SIGNIFICANT CHANGE UP (ref 0–0.5)
EOSINOPHIL NFR BLD AUTO: 2.8 % — SIGNIFICANT CHANGE UP (ref 0–6)
GLUCOSE SERPL-MCNC: 85 MG/DL
HCT VFR BLD CALC: 43.8 % — SIGNIFICANT CHANGE UP (ref 39–50)
HGB BLD-MCNC: 15.4 G/DL — SIGNIFICANT CHANGE UP (ref 13–17)
LYMPHOCYTES # BLD AUTO: 0.8 K/UL — LOW (ref 1–3.3)
LYMPHOCYTES # BLD AUTO: 16.4 % — SIGNIFICANT CHANGE UP (ref 13–44)
MCHC RBC-ENTMCNC: 30.9 PG — SIGNIFICANT CHANGE UP (ref 27–34)
MCHC RBC-ENTMCNC: 35.2 G/DL — SIGNIFICANT CHANGE UP (ref 32–36)
MCV RBC AUTO: 87.7 FL — SIGNIFICANT CHANGE UP (ref 80–100)
MONOCYTES # BLD AUTO: 0.4 K/UL — SIGNIFICANT CHANGE UP (ref 0–0.9)
MONOCYTES NFR BLD AUTO: 8.5 % — SIGNIFICANT CHANGE UP (ref 2–14)
NEUTROPHILS # BLD AUTO: 3.6 K/UL — SIGNIFICANT CHANGE UP (ref 1.8–7.4)
NEUTROPHILS NFR BLD AUTO: 71.1 % — SIGNIFICANT CHANGE UP (ref 43–77)
PLATELET # BLD AUTO: 260 K/UL — SIGNIFICANT CHANGE UP (ref 150–400)
POTASSIUM SERPL-SCNC: 5 MMOL/L
PROT SERPL-MCNC: 7 G/DL
PSA SERPL-MCNC: 1.23 NG/ML
RBC # BLD: 5 M/UL — SIGNIFICANT CHANGE UP (ref 4.2–5.8)
RBC # FLD: 11.4 % — SIGNIFICANT CHANGE UP (ref 10.3–14.5)
SODIUM SERPL-SCNC: 141 MMOL/L
WBC # BLD: 5 K/UL — SIGNIFICANT CHANGE UP (ref 3.8–10.5)
WBC # FLD AUTO: 5 K/UL — SIGNIFICANT CHANGE UP (ref 3.8–10.5)

## 2019-12-19 ENCOUNTER — APPOINTMENT (OUTPATIENT)
Dept: HEMATOLOGY ONCOLOGY | Facility: CLINIC | Age: 64
End: 2019-12-19
Payer: MEDICARE

## 2019-12-19 VITALS
HEIGHT: 66 IN | BODY MASS INDEX: 26.52 KG/M2 | TEMPERATURE: 98.1 F | HEART RATE: 70 BPM | OXYGEN SATURATION: 98 % | WEIGHT: 165 LBS | SYSTOLIC BLOOD PRESSURE: 155 MMHG | DIASTOLIC BLOOD PRESSURE: 78 MMHG

## 2019-12-19 PROCEDURE — 99214 OFFICE O/P EST MOD 30 MIN: CPT

## 2019-12-23 ENCOUNTER — APPOINTMENT (OUTPATIENT)
Dept: UROLOGY | Facility: CLINIC | Age: 64
End: 2019-12-23

## 2019-12-23 NOTE — REVIEW OF SYSTEMS
[Diarrhea] : diarrhea [Incontinence] : incontinence [Negative] : Constitutional [FreeTextEntry8] : u

## 2019-12-23 NOTE — HISTORY OF PRESENT ILLNESS
[de-identified] : Mr. Singleton is a 64 year old male following up for prostate cancer. He saw Dr. García for PSA elevation from 5 in 2017 to 9 in March 2018. 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 He is s/p robotic-assisted radical prostatectomy with Dr. Mckenna at [facility] on 3/14/18, with pathology identical to original: [place] 9 prostate adenCa with extensive perineural invasion, sravani-prostatic spread in soft tissue surrounding seminal vesicles, bilateral extensive seminal vesicle involvement and tumor within a few cell layers of bladder base. Soft tissue margins of both seminal vesicles were positive, 4 pelvic nodes were negative. Post-op PSA on 4/10/18 was 1.57. Started on Lupron in April 2018 and treated with external beam radiation in May 2018. Due to intolerable side effects (fatigue, muscle weakness, sexual dysfunction) Jaden decided to hold Lupron in December 2018, at which point his PSA was <0.01. He continues under surveillance with PSA monitoring, and prefers not to start Lupron at this time. [de-identified] : C/o frequent diarrhea (2 times per day) and intermittent incontinence. \par \par Last PSA: 1.23 ng/mL on 12/16/19\par Previous PSA: 0.39 ng/mL on 9/21/19\par \par \par

## 2019-12-23 NOTE — ASSESSMENT
[FreeTextEntry1] : Adenocarcinoma of the prostate, [place] 9, S/P radical prostatectomy and external beam RT for positive seminal vesicle margin. S/P Lupron ADT from April to December 2018, stopped due to intolerable side-effects (metabolic syndrome/ED). Recent PSA (12/16/19) shows elevation, 1.23 compared to <0.01 about nine months prior.\par \par With rising PSA, presented options of intermittent androgen deprivation (monitoring PSA until it reaches 50% of baseline PSA, at which point Lupron will be restarted) or continuous androgen deprivation with Lupron. \par \par Plan:\par -Patient wishes to restart Lupron when PSA reaches value of 3. \par -Continue to monitor PSA with Dr. Mckenna every 2 months \par -Follow up in 2 months

## 2020-02-03 ENCOUNTER — OUTPATIENT (OUTPATIENT)
Dept: OUTPATIENT SERVICES | Facility: HOSPITAL | Age: 65
LOS: 1 days | Discharge: HOME | End: 2020-02-03

## 2020-02-03 DIAGNOSIS — Z96.7 PRESENCE OF OTHER BONE AND TENDON IMPLANTS: Chronic | ICD-10-CM

## 2020-02-03 DIAGNOSIS — C61 MALIGNANT NEOPLASM OF PROSTATE: ICD-10-CM

## 2020-02-03 DIAGNOSIS — Z98.890 OTHER SPECIFIED POSTPROCEDURAL STATES: Chronic | ICD-10-CM

## 2020-02-04 ENCOUNTER — RESULT REVIEW (OUTPATIENT)
Age: 65
End: 2020-02-04

## 2020-02-04 ENCOUNTER — APPOINTMENT (OUTPATIENT)
Dept: HEMATOLOGY ONCOLOGY | Facility: CLINIC | Age: 65
End: 2020-02-04

## 2020-02-04 LAB
BASOPHILS # BLD AUTO: 0 K/UL — SIGNIFICANT CHANGE UP (ref 0–0.2)
BASOPHILS NFR BLD AUTO: 0.8 % — SIGNIFICANT CHANGE UP (ref 0–2)
EOSINOPHIL # BLD AUTO: 0.3 K/UL — SIGNIFICANT CHANGE UP (ref 0–0.5)
EOSINOPHIL NFR BLD AUTO: 4.2 % — SIGNIFICANT CHANGE UP (ref 0–6)
HCT VFR BLD CALC: 43.3 % — SIGNIFICANT CHANGE UP (ref 39–50)
HGB BLD-MCNC: 15 G/DL — SIGNIFICANT CHANGE UP (ref 13–17)
LYMPHOCYTES # BLD AUTO: 0.9 K/UL — LOW (ref 1–3.3)
LYMPHOCYTES # BLD AUTO: 15.1 % — SIGNIFICANT CHANGE UP (ref 13–44)
MCHC RBC-ENTMCNC: 31 PG — SIGNIFICANT CHANGE UP (ref 27–34)
MCHC RBC-ENTMCNC: 34.6 G/DL — SIGNIFICANT CHANGE UP (ref 32–36)
MCV RBC AUTO: 89.7 FL — SIGNIFICANT CHANGE UP (ref 80–100)
MONOCYTES # BLD AUTO: 0.4 K/UL — SIGNIFICANT CHANGE UP (ref 0–0.9)
MONOCYTES NFR BLD AUTO: 7.1 % — SIGNIFICANT CHANGE UP (ref 2–14)
NEUTROPHILS # BLD AUTO: 4.5 K/UL — SIGNIFICANT CHANGE UP (ref 1.8–7.4)
NEUTROPHILS NFR BLD AUTO: 72.8 % — SIGNIFICANT CHANGE UP (ref 43–77)
PLATELET # BLD AUTO: 221 K/UL — SIGNIFICANT CHANGE UP (ref 150–400)
RBC # BLD: 4.83 M/UL — SIGNIFICANT CHANGE UP (ref 4.2–5.8)
RBC # FLD: 11.3 % — SIGNIFICANT CHANGE UP (ref 10.3–14.5)
WBC # BLD: 6.2 K/UL — SIGNIFICANT CHANGE UP (ref 3.8–10.5)
WBC # FLD AUTO: 6.2 K/UL — SIGNIFICANT CHANGE UP (ref 3.8–10.5)

## 2020-02-06 LAB
ALBUMIN SERPL ELPH-MCNC: 4.7 G/DL
ALP BLD-CCNC: 117 U/L
ALT SERPL-CCNC: 15 U/L
ANION GAP SERPL CALC-SCNC: 13 MMOL/L
AST SERPL-CCNC: 19 U/L
BILIRUB SERPL-MCNC: 1.2 MG/DL
BUN SERPL-MCNC: 14 MG/DL
CALCIUM SERPL-MCNC: 9.7 MG/DL
CHLORIDE SERPL-SCNC: 101 MMOL/L
CO2 SERPL-SCNC: 26 MMOL/L
CREAT SERPL-MCNC: 1.11 MG/DL
GLUCOSE SERPL-MCNC: 114 MG/DL
POTASSIUM SERPL-SCNC: 5 MMOL/L
PROT SERPL-MCNC: 6.6 G/DL
SODIUM SERPL-SCNC: 139 MMOL/L

## 2020-02-09 LAB
PSA FREE FLD-MCNC: 9 %
PSA FREE SERPL-MCNC: 0.18 NG/ML
PSA SERPL-MCNC: 2.09 NG/ML

## 2020-02-11 ENCOUNTER — APPOINTMENT (OUTPATIENT)
Dept: HEMATOLOGY ONCOLOGY | Facility: CLINIC | Age: 65
End: 2020-02-11
Payer: MEDICARE

## 2020-02-11 VITALS
OXYGEN SATURATION: 97 % | DIASTOLIC BLOOD PRESSURE: 93 MMHG | HEART RATE: 66 BPM | WEIGHT: 167 LBS | BODY MASS INDEX: 26.84 KG/M2 | SYSTOLIC BLOOD PRESSURE: 140 MMHG | HEIGHT: 66 IN | TEMPERATURE: 98.6 F

## 2020-02-11 PROCEDURE — 99214 OFFICE O/P EST MOD 30 MIN: CPT

## 2020-02-12 NOTE — HISTORY OF PRESENT ILLNESS
[T: ___] : T[unfilled] [Disease: _____________________] : Disease: [unfilled] [N: ___] : N[unfilled] [M: ___] : M[unfilled] [AJCC Stage: ____] : AJCC Stage: [unfilled] [de-identified] : Mr. Singleton is a 64 year old male following up for prostate cancer. He saw Dr. García for PSA elevation from 5 in 2017 to 9 in March 2018. 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 He is s/p robotic-assisted radical prostatectomy with Dr. Mckenna at [facility] on 3/14/18, with pathology identical to original: [place] 9 prostate adenCa with extensive perineural invasion, sravani-prostatic spread in soft tissue surrounding seminal vesicles, bilateral extensive seminal vesicle involvement and tumor within a few cell layers of bladder base. Soft tissue margins of both seminal vesicles were positive, 4 pelvic nodes were negative. Post-op PSA on 4/10/18 was 1.57. Started on Lupron in April 2018 and treated with external beam radiation in May 2018. Due to intolerable side effects (fatigue, muscle weakness, sexual dysfunction) Jaden decided to hold Lupron in December 2018, at which point his PSA was <0.01. He continues under surveillance with PSA monitoring, and prefers not to start Lupron at this time. [de-identified] : adenocarcinoma  [de-identified] : C/o pain in the left rib and right back.\par \par Last PSA: 2.09 ng/mL on 2/4/2020\par Previous PSA: 01.23 ng/mL on 12/16/19\par \par \par

## 2020-02-12 NOTE — ASSESSMENT
[FreeTextEntry1] : Adenocarcinoma of the prostate, [place] 9, S/P radical prostatectomy and external beam RT for positive seminal vesicle margin. S/P Lupron ADT from April to December 2018, stopped due to intolerable side-effects (metabolic syndrome/ED). Recent PSA (2/4/2020) shows elevation, 2.09 compared to <0.01 about 11 months prior.\par \par -With rising PSA, presented options of intermittent androgen deprivation (monitoring PSA until it reaches 50% of baseline PSA, at which point Lupron will be restarted) or continuous androgen deprivation with Lupron. \par -Patient wishes to proceed with intermittent androgen deprivation. \par \par Plan:\par -Start Lupron q 3 months\par -Follow up in 3 months

## 2020-02-18 ENCOUNTER — APPOINTMENT (OUTPATIENT)
Age: 65
End: 2020-02-18

## 2020-03-05 ENCOUNTER — APPOINTMENT (OUTPATIENT)
Dept: UROLOGY | Facility: CLINIC | Age: 65
End: 2020-03-05
Payer: MEDICARE

## 2020-03-05 VITALS — SYSTOLIC BLOOD PRESSURE: 129 MMHG | HEART RATE: 73 BPM | DIASTOLIC BLOOD PRESSURE: 83 MMHG

## 2020-03-05 PROCEDURE — 99213 OFFICE O/P EST LOW 20 MIN: CPT

## 2020-03-05 RX ORDER — PANTOPRAZOLE 20 MG/1
20 TABLET, DELAYED RELEASE ORAL
Refills: 0 | Status: DISCONTINUED | COMMUNITY
End: 2020-03-05

## 2020-03-05 RX ORDER — OXYBUTYNIN CHLORIDE 5 MG/1
5 TABLET ORAL TWICE DAILY
Qty: 90 | Refills: 1 | Status: DISCONTINUED | COMMUNITY
Start: 2018-10-16 | End: 2020-03-05

## 2020-03-05 NOTE — ASSESSMENT
[FreeTextEntry1] : Impression:\par \par Prostate cancer, \par just restarted lupron after  PSA anup to 2.0\par He is feeling ok\par \par Plan:\par \par check PSA\par has fatigue so we can check testosterone and TSH\par Probably should have had a bone scan with PSA at 2.0 but now likely would not be helpful.\par Consider bone density study if on long term androgen deprivation.\par \par Follow up in 1 week to review.

## 2020-03-05 NOTE — LETTER BODY
[Dear  ___] : Dear  [unfilled], [Courtesy Letter:] : I had the pleasure of seeing your patient, [unfilled], in my office today. [Please see my note below.] : Please see my note below. [Sincerely,] : Sincerely, [FreeTextEntry3] : Ed\par \par Feroz Richard MD\par [facility]\par  of Urology\par [employer] [facility]\par

## 2020-03-06 LAB
PSA SERPL-MCNC: 0.54 NG/ML
TESTOST SERPL-MCNC: 17.3 NG/DL
TSH SERPL-ACNC: 0.63 UIU/ML

## 2020-03-12 ENCOUNTER — APPOINTMENT (OUTPATIENT)
Dept: UROLOGY | Facility: CLINIC | Age: 65
End: 2020-03-12
Payer: MEDICARE

## 2020-03-12 PROCEDURE — 99213 OFFICE O/P EST LOW 20 MIN: CPT

## 2020-03-12 NOTE — HISTORY OF PRESENT ILLNESS
[FreeTextEntry1] : patient with chanelle 9 prostate cancer. now on Lupron again.  PSA dropped to 0.54 from 2.09. He has some lethargy.  Patient is sleeping about 8 hours per day.  no voiding issues

## 2020-03-12 NOTE — LETTER BODY
[Dear  ___] : Dear  [unfilled], [Courtesy Letter:] : I had the pleasure of seeing your patient, [unfilled], in my office today. [Please see my note below.] : Please see my note below. [Sincerely,] : Sincerely, [FreeTextEntry3] : Ed\par \par Feroz Richard MD\par [facility]\par  of Urology\par [employer] at [facility]\par

## 2020-03-12 NOTE — PHYSICAL EXAM

## 2020-03-12 NOTE — ASSESSMENT
[FreeTextEntry1] : Impression:\par \par Prostate cancer, good response to Lupron\par \par Plan\par \par \par I would recommend continuous lupron therapy\par to follow up wit Dr. Landers.\par No need for free and total PSA\par \par

## 2020-05-07 ENCOUNTER — OUTPATIENT (OUTPATIENT)
Dept: OUTPATIENT SERVICES | Facility: HOSPITAL | Age: 65
LOS: 1 days | Discharge: HOME | End: 2020-05-07

## 2020-05-07 DIAGNOSIS — Z98.890 OTHER SPECIFIED POSTPROCEDURAL STATES: Chronic | ICD-10-CM

## 2020-05-07 DIAGNOSIS — C61 MALIGNANT NEOPLASM OF PROSTATE: ICD-10-CM

## 2020-05-07 DIAGNOSIS — Z96.7 PRESENCE OF OTHER BONE AND TENDON IMPLANTS: Chronic | ICD-10-CM

## 2020-05-13 ENCOUNTER — APPOINTMENT (OUTPATIENT)
Age: 65
End: 2020-05-13

## 2020-05-13 ENCOUNTER — APPOINTMENT (OUTPATIENT)
Dept: HEMATOLOGY ONCOLOGY | Facility: CLINIC | Age: 65
End: 2020-05-13
Payer: MEDICARE

## 2020-05-13 ENCOUNTER — RESULT REVIEW (OUTPATIENT)
Age: 65
End: 2020-05-13

## 2020-05-13 VITALS
SYSTOLIC BLOOD PRESSURE: 136 MMHG | HEART RATE: 79 BPM | WEIGHT: 161 LBS | HEIGHT: 66 IN | DIASTOLIC BLOOD PRESSURE: 90 MMHG | BODY MASS INDEX: 25.88 KG/M2

## 2020-05-13 DIAGNOSIS — R68.89 OTHER GENERAL SYMPTOMS AND SIGNS: ICD-10-CM

## 2020-05-13 LAB
BASOPHILS # BLD AUTO: 0 K/UL — SIGNIFICANT CHANGE UP (ref 0–0.2)
BASOPHILS NFR BLD AUTO: 0.8 % — SIGNIFICANT CHANGE UP (ref 0–2)
EOSINOPHIL # BLD AUTO: 0.3 K/UL — SIGNIFICANT CHANGE UP (ref 0–0.5)
EOSINOPHIL NFR BLD AUTO: 4.3 % — SIGNIFICANT CHANGE UP (ref 0–6)
HCT VFR BLD CALC: 39.6 % — SIGNIFICANT CHANGE UP (ref 39–50)
HGB BLD-MCNC: 14.6 G/DL — SIGNIFICANT CHANGE UP (ref 13–17)
LYMPHOCYTES # BLD AUTO: 1.1 K/UL — SIGNIFICANT CHANGE UP (ref 1–3.3)
LYMPHOCYTES # BLD AUTO: 18.8 % — SIGNIFICANT CHANGE UP (ref 13–44)
MCHC RBC-ENTMCNC: 31.4 PG — SIGNIFICANT CHANGE UP (ref 27–34)
MCHC RBC-ENTMCNC: 36.8 G/DL — HIGH (ref 32–36)
MCV RBC AUTO: 85.5 FL — SIGNIFICANT CHANGE UP (ref 80–100)
MONOCYTES # BLD AUTO: 0.5 K/UL — SIGNIFICANT CHANGE UP (ref 0–0.9)
MONOCYTES NFR BLD AUTO: 8 % — SIGNIFICANT CHANGE UP (ref 2–14)
NEUTROPHILS # BLD AUTO: 4.1 K/UL — SIGNIFICANT CHANGE UP (ref 1.8–7.4)
NEUTROPHILS NFR BLD AUTO: 68.1 % — SIGNIFICANT CHANGE UP (ref 43–77)
PLATELET # BLD AUTO: 233 K/UL — SIGNIFICANT CHANGE UP (ref 150–400)
RBC # BLD: 4.63 M/UL — SIGNIFICANT CHANGE UP (ref 4.2–5.8)
RBC # FLD: 11.8 % — SIGNIFICANT CHANGE UP (ref 10.3–14.5)
SARS-COV-2 IGG SERPL IA-ACNC: <0.1 INDEX
SARS-COV-2 IGG SERPL QL IA: NEGATIVE
WBC # BLD: 6 K/UL — SIGNIFICANT CHANGE UP (ref 3.8–10.5)
WBC # FLD AUTO: 6 K/UL — SIGNIFICANT CHANGE UP (ref 3.8–10.5)

## 2020-05-13 PROCEDURE — 99213 OFFICE O/P EST LOW 20 MIN: CPT | Mod: 95

## 2020-05-13 NOTE — HISTORY OF PRESENT ILLNESS
[Other Location: e.g. School (Enter Location, City,State)___] : at [unfilled], at the time of the visit. [Other Location: e.g. Home (Enter Location, City,State)___] : at [unfilled] [de-identified] : \par Mr. Singleton is a 64 year old male following up for prostate cancer. He saw Dr. García for PSA elevation from 5 in 2017 to 9 in March 2018. 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 He is s/p robotic-assisted radical prostatectomy with Dr. Mckenna at [facility] on 3/14/18, with pathology identical to original: Aditya 9 prostate adenCa with extensive perineural invasion, sravani-prostatic spread in soft tissue surrounding seminal vesicles, bilateral extensive seminal vesicle involvement and tumor within a few cell layers of bladder base. Soft tissue margins of both seminal vesicles were positive, 4 pelvic nodes were negative. Post-op PSA on 4/10/18 was 1.57. Started on Lupron in April 2018 and treated with external beam radiation in May 2018. Due to intolerable side effects (fatigue, muscle weakness, sexual dysfunction) Jaden decided to hold Lupron in December 2018, at which point his PSA was <0.01. He is now treated with intermittent androgen deprivation, after PSA anup to 2.0\par \par  [de-identified] : Back in 2/20, had drenching sweat (different than Lupron hot flashes) and cough for 2 days, r/o Covid19.\par Now fully recovered.\par Will screen for COVID19 antibody.

## 2020-05-13 NOTE — ASSESSMENT
[FreeTextEntry1] : Prostate cancer, with PSA recurrence, now treating with intermittent androgen deprivation - restarted Lupron 2/20, treate again today and plans for 1 more round in 8/20, then to hold lupron until PSA has risen back to around 2.\par 20 minutes spent reviewing past history, current clinical status and future care plans.

## 2020-05-15 LAB
ALBUMIN SERPL ELPH-MCNC: 4.4 G/DL
ALP BLD-CCNC: 110 U/L
ALT SERPL-CCNC: 27 U/L
ANION GAP SERPL CALC-SCNC: 15 MMOL/L
AST SERPL-CCNC: 24 U/L
BILIRUB SERPL-MCNC: 0.6 MG/DL
BUN SERPL-MCNC: 19 MG/DL
CALCIUM SERPL-MCNC: 9.4 MG/DL
CHLORIDE SERPL-SCNC: 105 MMOL/L
CO2 SERPL-SCNC: 23 MMOL/L
CREAT SERPL-MCNC: 0.95 MG/DL
GLUCOSE SERPL-MCNC: 116 MG/DL
POTASSIUM SERPL-SCNC: 4 MMOL/L
PROT SERPL-MCNC: 6.5 G/DL
PSA FREE FLD-MCNC: NORMAL %
PSA FREE SERPL-MCNC: <0.01 NG/ML
PSA SERPL-MCNC: <0.01 NG/ML
SODIUM SERPL-SCNC: 143 MMOL/L

## 2020-08-06 ENCOUNTER — OUTPATIENT (OUTPATIENT)
Dept: OUTPATIENT SERVICES | Facility: HOSPITAL | Age: 65
LOS: 1 days | Discharge: HOME | End: 2020-08-06

## 2020-08-06 DIAGNOSIS — Z98.890 OTHER SPECIFIED POSTPROCEDURAL STATES: Chronic | ICD-10-CM

## 2020-08-06 DIAGNOSIS — C61 MALIGNANT NEOPLASM OF PROSTATE: ICD-10-CM

## 2020-08-06 DIAGNOSIS — Z96.7 PRESENCE OF OTHER BONE AND TENDON IMPLANTS: Chronic | ICD-10-CM

## 2020-08-13 ENCOUNTER — APPOINTMENT (OUTPATIENT)
Age: 65
End: 2020-08-13

## 2020-09-04 ENCOUNTER — OUTPATIENT (OUTPATIENT)
Dept: OUTPATIENT SERVICES | Facility: HOSPITAL | Age: 65
LOS: 1 days | Discharge: HOME | End: 2020-09-04

## 2020-09-04 DIAGNOSIS — Z98.890 OTHER SPECIFIED POSTPROCEDURAL STATES: Chronic | ICD-10-CM

## 2020-09-04 DIAGNOSIS — C61 MALIGNANT NEOPLASM OF PROSTATE: ICD-10-CM

## 2020-09-04 DIAGNOSIS — Z96.7 PRESENCE OF OTHER BONE AND TENDON IMPLANTS: Chronic | ICD-10-CM

## 2020-09-11 ENCOUNTER — RESULT REVIEW (OUTPATIENT)
Age: 65
End: 2020-09-11

## 2020-09-11 ENCOUNTER — APPOINTMENT (OUTPATIENT)
Age: 65
End: 2020-09-11

## 2020-09-11 LAB
BASOPHILS # BLD AUTO: 0 K/UL — SIGNIFICANT CHANGE UP (ref 0–0.2)
BASOPHILS NFR BLD AUTO: 0.9 % — SIGNIFICANT CHANGE UP (ref 0–2)
EOSINOPHIL # BLD AUTO: 0.2 K/UL — SIGNIFICANT CHANGE UP (ref 0–0.5)
EOSINOPHIL NFR BLD AUTO: 3.9 % — SIGNIFICANT CHANGE UP (ref 0–6)
HCT VFR BLD CALC: 41.2 % — SIGNIFICANT CHANGE UP (ref 39–50)
HGB BLD-MCNC: 14.4 G/DL — SIGNIFICANT CHANGE UP (ref 13–17)
LYMPHOCYTES # BLD AUTO: 0.8 K/UL — LOW (ref 1–3.3)
LYMPHOCYTES # BLD AUTO: 20.8 % — SIGNIFICANT CHANGE UP (ref 13–44)
MCHC RBC-ENTMCNC: 31.2 PG — SIGNIFICANT CHANGE UP (ref 27–34)
MCHC RBC-ENTMCNC: 34.9 G/DL — SIGNIFICANT CHANGE UP (ref 32–36)
MCV RBC AUTO: 89.4 FL — SIGNIFICANT CHANGE UP (ref 80–100)
MONOCYTES # BLD AUTO: 0.4 K/UL — SIGNIFICANT CHANGE UP (ref 0–0.9)
MONOCYTES NFR BLD AUTO: 9.1 % — SIGNIFICANT CHANGE UP (ref 2–14)
NEUTROPHILS # BLD AUTO: 2.7 K/UL — SIGNIFICANT CHANGE UP (ref 1.8–7.4)
NEUTROPHILS NFR BLD AUTO: 65.2 % — SIGNIFICANT CHANGE UP (ref 43–77)
PLATELET # BLD AUTO: 258 K/UL — SIGNIFICANT CHANGE UP (ref 150–400)
RBC # BLD: 4.61 M/UL — SIGNIFICANT CHANGE UP (ref 4.2–5.8)
RBC # FLD: 10.8 % — SIGNIFICANT CHANGE UP (ref 10.3–14.5)
WBC # BLD: 4.1 K/UL — SIGNIFICANT CHANGE UP (ref 3.8–10.5)
WBC # FLD AUTO: 4.1 K/UL — SIGNIFICANT CHANGE UP (ref 3.8–10.5)

## 2020-09-14 DIAGNOSIS — R97.20 ELEVATED PROSTATE SPECIFIC ANTIGEN [PSA]: ICD-10-CM

## 2020-09-14 LAB
ALBUMIN SERPL ELPH-MCNC: 4.7 G/DL
ALP BLD-CCNC: 91 U/L
ALT SERPL-CCNC: 39 U/L
ANION GAP SERPL CALC-SCNC: 18 MMOL/L
AST SERPL-CCNC: 32 U/L
BILIRUB SERPL-MCNC: 0.8 MG/DL
BUN SERPL-MCNC: 22 MG/DL
CALCIUM SERPL-MCNC: 10.4 MG/DL
CHLORIDE SERPL-SCNC: 102 MMOL/L
CO2 SERPL-SCNC: 21 MMOL/L
CREAT SERPL-MCNC: 0.96 MG/DL
GLUCOSE SERPL-MCNC: 106 MG/DL
POTASSIUM SERPL-SCNC: 5 MMOL/L
PROT SERPL-MCNC: 6.6 G/DL
PSA SERPL-MCNC: <0.01 NG/ML
SODIUM SERPL-SCNC: 141 MMOL/L

## 2020-09-29 ENCOUNTER — RESULT REVIEW (OUTPATIENT)
Age: 65
End: 2020-09-29

## 2020-09-29 LAB
BASOPHILS # BLD AUTO: 0.1 K/UL — SIGNIFICANT CHANGE UP (ref 0–0.2)
BASOPHILS NFR BLD AUTO: 0.8 % — SIGNIFICANT CHANGE UP (ref 0–2)
EOSINOPHIL # BLD AUTO: 0.3 K/UL — SIGNIFICANT CHANGE UP (ref 0–0.5)
EOSINOPHIL NFR BLD AUTO: 2.9 % — SIGNIFICANT CHANGE UP (ref 0–6)
HCT VFR BLD CALC: 37.9 % — LOW (ref 39–50)
HGB BLD-MCNC: 12.7 G/DL — LOW (ref 13–17)
LYMPHOCYTES # BLD AUTO: 1.3 K/UL — SIGNIFICANT CHANGE UP (ref 1–3.3)
LYMPHOCYTES # BLD AUTO: 13.8 % — SIGNIFICANT CHANGE UP (ref 13–44)
MCHC RBC-ENTMCNC: 28.2 PG — SIGNIFICANT CHANGE UP (ref 27–34)
MCHC RBC-ENTMCNC: 33.6 G/DL — SIGNIFICANT CHANGE UP (ref 32–36)
MCV RBC AUTO: 84 FL — SIGNIFICANT CHANGE UP (ref 80–100)
MONOCYTES # BLD AUTO: 1.1 K/UL — HIGH (ref 0–0.9)
MONOCYTES NFR BLD AUTO: 11.5 % — SIGNIFICANT CHANGE UP (ref 2–14)
NEUTROPHILS # BLD AUTO: 6.8 K/UL — SIGNIFICANT CHANGE UP (ref 1.8–7.4)
NEUTROPHILS NFR BLD AUTO: 71 % — SIGNIFICANT CHANGE UP (ref 43–77)
PLATELET # BLD AUTO: 156 K/UL — SIGNIFICANT CHANGE UP (ref 150–400)
RBC # BLD: 4.51 M/UL — SIGNIFICANT CHANGE UP (ref 4.2–5.8)
RBC # FLD: 13.5 % — SIGNIFICANT CHANGE UP (ref 10.3–14.5)
WBC # BLD: 9.6 K/UL — SIGNIFICANT CHANGE UP (ref 3.8–10.5)
WBC # FLD AUTO: 9.6 K/UL — SIGNIFICANT CHANGE UP (ref 3.8–10.5)

## 2020-10-08 ENCOUNTER — OUTPATIENT (OUTPATIENT)
Dept: OUTPATIENT SERVICES | Facility: HOSPITAL | Age: 65
LOS: 1 days | Discharge: HOME | End: 2020-10-08

## 2020-10-08 DIAGNOSIS — Z96.7 PRESENCE OF OTHER BONE AND TENDON IMPLANTS: Chronic | ICD-10-CM

## 2020-10-08 DIAGNOSIS — Z98.890 OTHER SPECIFIED POSTPROCEDURAL STATES: Chronic | ICD-10-CM

## 2020-10-08 DIAGNOSIS — R97.20 ELEVATED PROSTATE SPECIFIC ANTIGEN [PSA]: ICD-10-CM

## 2020-10-09 ENCOUNTER — APPOINTMENT (OUTPATIENT)
Age: 65
End: 2020-10-09

## 2020-10-12 DIAGNOSIS — C61 MALIGNANT NEOPLASM OF PROSTATE: ICD-10-CM

## 2020-11-07 ENCOUNTER — OUTPATIENT (OUTPATIENT)
Dept: OUTPATIENT SERVICES | Facility: HOSPITAL | Age: 65
LOS: 1 days | Discharge: HOME | End: 2020-11-07

## 2020-11-07 DIAGNOSIS — Z98.890 OTHER SPECIFIED POSTPROCEDURAL STATES: Chronic | ICD-10-CM

## 2020-11-07 DIAGNOSIS — Z96.7 PRESENCE OF OTHER BONE AND TENDON IMPLANTS: Chronic | ICD-10-CM

## 2020-11-07 DIAGNOSIS — R97.20 ELEVATED PROSTATE SPECIFIC ANTIGEN [PSA]: ICD-10-CM

## 2020-11-07 DIAGNOSIS — C61 MALIGNANT NEOPLASM OF PROSTATE: ICD-10-CM

## 2020-11-12 ENCOUNTER — APPOINTMENT (OUTPATIENT)
Dept: HEMATOLOGY ONCOLOGY | Facility: CLINIC | Age: 65
End: 2020-11-12
Payer: MEDICARE

## 2020-11-12 ENCOUNTER — APPOINTMENT (OUTPATIENT)
Age: 65
End: 2020-11-12

## 2020-11-12 ENCOUNTER — RESULT REVIEW (OUTPATIENT)
Age: 65
End: 2020-11-12

## 2020-11-12 VITALS
WEIGHT: 162.03 LBS | BODY MASS INDEX: 26.04 KG/M2 | HEIGHT: 66 IN | TEMPERATURE: 97.2 F | SYSTOLIC BLOOD PRESSURE: 153 MMHG | DIASTOLIC BLOOD PRESSURE: 91 MMHG | HEART RATE: 68 BPM | OXYGEN SATURATION: 94 %

## 2020-11-12 VITALS — TEMPERATURE: 98 F

## 2020-11-12 LAB
BASOPHILS # BLD AUTO: 0.1 K/UL — SIGNIFICANT CHANGE UP (ref 0–0.2)
BASOPHILS NFR BLD AUTO: 1.2 % — SIGNIFICANT CHANGE UP (ref 0–2)
EOSINOPHIL # BLD AUTO: 0.4 K/UL — SIGNIFICANT CHANGE UP (ref 0–0.5)
EOSINOPHIL NFR BLD AUTO: 7.7 % — HIGH (ref 0–6)
HCT VFR BLD CALC: 41.8 % — SIGNIFICANT CHANGE UP (ref 39–50)
HGB BLD-MCNC: 14.4 G/DL — SIGNIFICANT CHANGE UP (ref 13–17)
LYMPHOCYTES # BLD AUTO: 1.1 K/UL — SIGNIFICANT CHANGE UP (ref 1–3.3)
LYMPHOCYTES # BLD AUTO: 23.8 % — SIGNIFICANT CHANGE UP (ref 13–44)
MCHC RBC-ENTMCNC: 31.7 PG — SIGNIFICANT CHANGE UP (ref 27–34)
MCHC RBC-ENTMCNC: 34.4 G/DL — SIGNIFICANT CHANGE UP (ref 32–36)
MCV RBC AUTO: 92.2 FL — SIGNIFICANT CHANGE UP (ref 80–100)
MONOCYTES # BLD AUTO: 0.4 K/UL — SIGNIFICANT CHANGE UP (ref 0–0.9)
MONOCYTES NFR BLD AUTO: 7.7 % — SIGNIFICANT CHANGE UP (ref 2–14)
NEUTROPHILS # BLD AUTO: 2.8 K/UL — SIGNIFICANT CHANGE UP (ref 1.8–7.4)
NEUTROPHILS NFR BLD AUTO: 59.6 % — SIGNIFICANT CHANGE UP (ref 43–77)
PLATELET # BLD AUTO: 258 K/UL — SIGNIFICANT CHANGE UP (ref 150–400)
RBC # BLD: 4.53 M/UL — SIGNIFICANT CHANGE UP (ref 4.2–5.8)
RBC # FLD: 10.8 % — SIGNIFICANT CHANGE UP (ref 10.3–14.5)
WBC # BLD: 4.6 K/UL — SIGNIFICANT CHANGE UP (ref 3.8–10.5)
WBC # FLD AUTO: 4.6 K/UL — SIGNIFICANT CHANGE UP (ref 3.8–10.5)

## 2020-11-12 PROCEDURE — 99214 OFFICE O/P EST MOD 30 MIN: CPT

## 2020-11-12 NOTE — ASSESSMENT
[FreeTextEntry1] : \par Prostate cancer, with PSA recurrence, now treating with intermittent androgen deprivation - restarted Lupron 2/20, through 10/20, now to hold lupron until PSA has risen back to around 1- 2.\par \par

## 2020-11-12 NOTE — HISTORY OF PRESENT ILLNESS
[de-identified] : \par \par Mr. Singleton is a 65 year old male following up for prostate cancer. He saw Dr. García for PSA elevation from 5 in 2017 to 9 in March 2018. Exam showed a hard stony prostate, and transrectal ultrasound-guided biopsy confirmed [place] 9 adenocarcinoma of the prostate with perineural invasion. He is s/p robotic-assisted radical prostatectomy with Dr. Mckenna at [facility] on 3/14/18, with pathology identical to original: Aditya 9 prostate adenCa with extensive perineural invasion, sravani-prostatic spread in soft tissue surrounding seminal vesicles, bilateral extensive seminal vesicle involvement and tumor within a few cell layers of bladder base. Soft tissue margins of both seminal vesicles were positive, 4 pelvic nodes were negative. Post-op PSA on 4/10/18 was 1.57. Started on Lupron in April 2018 and treated with external beam radiation in May 2018. Due to intolerable side effects (fatigue, muscle weakness, sexual dysfunction) Jaden decided to hold Lupron in December 2018, at which point his PSA was <0.01. He is now treated with intermittent androgen deprivation, after PSA anup to 2.0\par  [de-identified] : Last Lupron injection was 7.5 mg on October 9.  PSA is now less than 0.01, and we will be able to hold Lupron until the PSA rises to greater than 1.

## 2020-11-12 NOTE — REVIEW OF SYSTEMS
[Negative] : Allergic/Immunologic [FreeTextEntry9] : Intermittent bilateral shoulder shooting pains.

## 2020-11-25 ENCOUNTER — APPOINTMENT (OUTPATIENT)
Dept: UROLOGY | Facility: CLINIC | Age: 65
End: 2020-11-25

## 2020-12-11 ENCOUNTER — APPOINTMENT (OUTPATIENT)
Age: 65
End: 2020-12-11

## 2021-02-08 ENCOUNTER — OUTPATIENT (OUTPATIENT)
Dept: OUTPATIENT SERVICES | Facility: HOSPITAL | Age: 66
LOS: 1 days | Discharge: HOME | End: 2021-02-08

## 2021-02-08 DIAGNOSIS — Z98.890 OTHER SPECIFIED POSTPROCEDURAL STATES: Chronic | ICD-10-CM

## 2021-02-08 DIAGNOSIS — C61 MALIGNANT NEOPLASM OF PROSTATE: ICD-10-CM

## 2021-02-08 DIAGNOSIS — Z96.7 PRESENCE OF OTHER BONE AND TENDON IMPLANTS: Chronic | ICD-10-CM

## 2021-02-08 DIAGNOSIS — R97.20 ELEVATED PROSTATE SPECIFIC ANTIGEN [PSA]: ICD-10-CM

## 2021-02-10 ENCOUNTER — APPOINTMENT (OUTPATIENT)
Dept: HEMATOLOGY ONCOLOGY | Facility: CLINIC | Age: 66
End: 2021-02-10
Payer: MEDICARE

## 2021-02-10 ENCOUNTER — RESULT REVIEW (OUTPATIENT)
Age: 66
End: 2021-02-10

## 2021-02-10 ENCOUNTER — APPOINTMENT (OUTPATIENT)
Age: 66
End: 2021-02-10

## 2021-02-10 VITALS
DIASTOLIC BLOOD PRESSURE: 90 MMHG | HEART RATE: 74 BPM | BODY MASS INDEX: 25.55 KG/M2 | HEIGHT: 66 IN | SYSTOLIC BLOOD PRESSURE: 146 MMHG | OXYGEN SATURATION: 98 % | WEIGHT: 159 LBS

## 2021-02-10 LAB
BASOPHILS # BLD AUTO: 0 K/UL — SIGNIFICANT CHANGE UP (ref 0–0.2)
BASOPHILS NFR BLD AUTO: 0.8 % — SIGNIFICANT CHANGE UP (ref 0–2)
EOSINOPHIL # BLD AUTO: 0.2 K/UL — SIGNIFICANT CHANGE UP (ref 0–0.5)
EOSINOPHIL NFR BLD AUTO: 3.7 % — SIGNIFICANT CHANGE UP (ref 0–6)
HCT VFR BLD CALC: 43.4 % — SIGNIFICANT CHANGE UP (ref 39–50)
HGB BLD-MCNC: 14.3 G/DL — SIGNIFICANT CHANGE UP (ref 13–17)
LYMPHOCYTES # BLD AUTO: 1 K/UL — SIGNIFICANT CHANGE UP (ref 1–3.3)
LYMPHOCYTES # BLD AUTO: 17.6 % — SIGNIFICANT CHANGE UP (ref 13–44)
MCHC RBC-ENTMCNC: 30.1 PG — SIGNIFICANT CHANGE UP (ref 27–34)
MCHC RBC-ENTMCNC: 32.8 G/DL — SIGNIFICANT CHANGE UP (ref 32–36)
MCV RBC AUTO: 91.6 FL — SIGNIFICANT CHANGE UP (ref 80–100)
MONOCYTES # BLD AUTO: 0.5 K/UL — SIGNIFICANT CHANGE UP (ref 0–0.9)
MONOCYTES NFR BLD AUTO: 7.9 % — SIGNIFICANT CHANGE UP (ref 2–14)
NEUTROPHILS # BLD AUTO: 4 K/UL — SIGNIFICANT CHANGE UP (ref 1.8–7.4)
NEUTROPHILS NFR BLD AUTO: 69.9 % — SIGNIFICANT CHANGE UP (ref 43–77)
PLATELET # BLD AUTO: 293 K/UL — SIGNIFICANT CHANGE UP (ref 150–400)
RBC # BLD: 4.74 M/UL — SIGNIFICANT CHANGE UP (ref 4.2–5.8)
RBC # FLD: 11.2 % — SIGNIFICANT CHANGE UP (ref 10.3–14.5)
WBC # BLD: 5.8 K/UL — SIGNIFICANT CHANGE UP (ref 3.8–10.5)
WBC # FLD AUTO: 5.8 K/UL — SIGNIFICANT CHANGE UP (ref 3.8–10.5)

## 2021-02-10 PROCEDURE — 99212 OFFICE O/P EST SF 10 MIN: CPT

## 2021-02-10 NOTE — ASSESSMENT
[FreeTextEntry1] : \par \par Prostate cancer, with PSA recurrence, now treating with intermittent androgen deprivation - restarted Lupron 2/20, through 10/20, now to continue holding  lupron until PSA has risen back to around 1- 2.\par \par  \par \par

## 2021-02-10 NOTE — HISTORY OF PRESENT ILLNESS
[de-identified] : \par \par \par Mr. Singleton is a 65 year old male following up for prostate cancer. He saw Dr. García for PSA elevation from 5 in 2017 to 9 in March 2018. 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 He is s/p robotic-assisted radical prostatectomy with Dr. Mckenna at [facility] on 3/14/18, with pathology identical to original: Aditya 9 prostate adenCa with extensive perineural invasion, sravani-prostatic spread in soft tissue surrounding seminal vesicles, bilateral extensive seminal vesicle involvement and tumor within a few cell layers of bladder base. Soft tissue margins of both seminal vesicles were positive, 4 pelvic nodes were negative. Post-op PSA on 4/10/18 was 1.57. Started on Lupron in April 2018 and treated with external beam radiation in May 2018. Due to intolerable side effects (fatigue, muscle weakness, sexual dysfunction) Jaden decided to hold Lupron in December 2018, at which point his PSA was <0.01. He is now treated with intermittent androgen deprivation, after PSA anup to 2.0\par . Last Lupron injection was 7.5 mg on October 9 2020.\par . PSA is now less than 0.01, and we will be able to hold Lupron until the PSA rises to greater than 1. \par \par

## 2021-02-11 LAB
ALBUMIN SERPL ELPH-MCNC: 4.7 G/DL
ALP BLD-CCNC: 109 U/L
ALT SERPL-CCNC: 34 U/L
ANION GAP SERPL CALC-SCNC: 12 MMOL/L
AST SERPL-CCNC: 25 U/L
BILIRUB SERPL-MCNC: 0.8 MG/DL
BUN SERPL-MCNC: 20 MG/DL
CALCIUM SERPL-MCNC: 9.8 MG/DL
CHLORIDE SERPL-SCNC: 100 MMOL/L
CO2 SERPL-SCNC: 26 MMOL/L
CREAT SERPL-MCNC: 1.06 MG/DL
GLUCOSE SERPL-MCNC: 149 MG/DL
POTASSIUM SERPL-SCNC: 4.5 MMOL/L
PROT SERPL-MCNC: 6.7 G/DL
PSA SERPL-MCNC: <0.01 NG/ML
SODIUM SERPL-SCNC: 138 MMOL/L

## 2021-05-07 ENCOUNTER — OUTPATIENT (OUTPATIENT)
Dept: OUTPATIENT SERVICES | Facility: HOSPITAL | Age: 66
LOS: 1 days | Discharge: HOME | End: 2021-05-07

## 2021-05-07 DIAGNOSIS — Z98.890 OTHER SPECIFIED POSTPROCEDURAL STATES: Chronic | ICD-10-CM

## 2021-05-07 DIAGNOSIS — Z96.7 PRESENCE OF OTHER BONE AND TENDON IMPLANTS: Chronic | ICD-10-CM

## 2021-05-07 DIAGNOSIS — C61 MALIGNANT NEOPLASM OF PROSTATE: ICD-10-CM

## 2021-05-10 ENCOUNTER — APPOINTMENT (OUTPATIENT)
Dept: HEMATOLOGY ONCOLOGY | Facility: CLINIC | Age: 66
End: 2021-05-10
Payer: MEDICARE

## 2021-05-10 ENCOUNTER — RESULT REVIEW (OUTPATIENT)
Age: 66
End: 2021-05-10

## 2021-05-10 VITALS
BODY MASS INDEX: 25.88 KG/M2 | HEART RATE: 69 BPM | OXYGEN SATURATION: 96 % | SYSTOLIC BLOOD PRESSURE: 134 MMHG | HEIGHT: 66 IN | WEIGHT: 161.03 LBS | DIASTOLIC BLOOD PRESSURE: 84 MMHG

## 2021-05-10 LAB
BASOPHILS # BLD AUTO: 0.1 K/UL — SIGNIFICANT CHANGE UP (ref 0–0.2)
BASOPHILS NFR BLD AUTO: 1 % — SIGNIFICANT CHANGE UP (ref 0–2)
EOSINOPHIL # BLD AUTO: 0.2 K/UL — SIGNIFICANT CHANGE UP (ref 0–0.5)
EOSINOPHIL NFR BLD AUTO: 4.2 % — SIGNIFICANT CHANGE UP (ref 0–6)
HCT VFR BLD CALC: 42.7 % — SIGNIFICANT CHANGE UP (ref 39–50)
HGB BLD-MCNC: 14.3 G/DL — SIGNIFICANT CHANGE UP (ref 13–17)
LYMPHOCYTES # BLD AUTO: 1.1 K/UL — SIGNIFICANT CHANGE UP (ref 1–3.3)
LYMPHOCYTES # BLD AUTO: 19.8 % — SIGNIFICANT CHANGE UP (ref 13–44)
MCHC RBC-ENTMCNC: 30.8 PG — SIGNIFICANT CHANGE UP (ref 27–34)
MCHC RBC-ENTMCNC: 33.6 G/DL — SIGNIFICANT CHANGE UP (ref 32–36)
MCV RBC AUTO: 91.7 FL — SIGNIFICANT CHANGE UP (ref 80–100)
MONOCYTES # BLD AUTO: 0.5 K/UL — SIGNIFICANT CHANGE UP (ref 0–0.9)
MONOCYTES NFR BLD AUTO: 8.3 % — SIGNIFICANT CHANGE UP (ref 2–14)
NEUTROPHILS # BLD AUTO: 3.8 K/UL — SIGNIFICANT CHANGE UP (ref 1.8–7.4)
NEUTROPHILS NFR BLD AUTO: 66.7 % — SIGNIFICANT CHANGE UP (ref 43–77)
PLATELET # BLD AUTO: 273 K/UL — SIGNIFICANT CHANGE UP (ref 150–400)
RBC # BLD: 4.65 M/UL — SIGNIFICANT CHANGE UP (ref 4.2–5.8)
RBC # FLD: 11.1 % — SIGNIFICANT CHANGE UP (ref 10.3–14.5)
WBC # BLD: 5.7 K/UL — SIGNIFICANT CHANGE UP (ref 3.8–10.5)
WBC # FLD AUTO: 5.7 K/UL — SIGNIFICANT CHANGE UP (ref 3.8–10.5)

## 2021-05-10 PROCEDURE — 99213 OFFICE O/P EST LOW 20 MIN: CPT

## 2021-05-10 NOTE — ASSESSMENT
[FreeTextEntry1] : \par \par Prostate cancer, with PSA recurrence, now treating with intermittent androgen deprivation - restarted Lupron 2/20, through 10/20, now to continue holding lupron until PSA has risen back to around 1- 2.\par \par Today's PSA 0.02.\par Bilateral gluteal pain has prompted a PET scan to restage.

## 2021-05-10 NOTE — HISTORY OF PRESENT ILLNESS
[de-identified] : \par \par Mr. Singleton is a 66 year old male following up for prostate cancer. He saw Dr. Gacría for PSA elevation from 5 in 2017 to 9 in March 2018. Exam showed a hard stony prostate, and transrectal ultrasound-guided biopsy confirmed [place] 9 adenocarcinoma of the prostate with perineural invasion. He is s/p robotic-assisted radical prostatectomy with Dr. Mckenna at [facility] on 3/14/18, with pathology identical to original: Aditya 9 prostate adenCa with extensive perineural invasion, sravani-prostatic spread in soft tissue surrounding seminal vesicles, bilateral extensive seminal vesicle involvement and tumor within a few cell layers of bladder base. Soft tissue margins of both seminal vesicles were positive, 4 pelvic nodes were negative. Post-op PSA on 4/10/18 was 1.57. Started on Lupron in April 2018 and treated with external beam radiation in May 2018. Due to intolerable side effects (fatigue, muscle weakness, sexual dysfunction) Jaden decided to hold Lupron in December 2018, at which point his PSA was <0.01. He is now treated with intermittent androgen deprivation, after PSA anup to 2.0\par . Last Lupron injection was 7.5 mg on October 9 2020.\par . PSA has been less than 0.01, and we have been able to hold Lupron until the PSA rises to greater than 1. \par  [de-identified] : Today's PSA 0.02.\jeferson Jaden describes bilateral pain in the lower fold of both buttocks, for which a PET scan will be obtained.

## 2021-05-11 LAB
ALBUMIN SERPL ELPH-MCNC: 4.3 G/DL
ALP BLD-CCNC: 121 U/L
ALT SERPL-CCNC: 16 U/L
ANION GAP SERPL CALC-SCNC: 12 MMOL/L
AST SERPL-CCNC: 16 U/L
BILIRUB SERPL-MCNC: 0.7 MG/DL
BUN SERPL-MCNC: 14 MG/DL
CALCIUM SERPL-MCNC: 9.2 MG/DL
CHLORIDE SERPL-SCNC: 103 MMOL/L
CO2 SERPL-SCNC: 26 MMOL/L
CREAT SERPL-MCNC: 0.95 MG/DL
GLUCOSE SERPL-MCNC: 83 MG/DL
POTASSIUM SERPL-SCNC: 4.5 MMOL/L
PROT SERPL-MCNC: 6.5 G/DL
PSA SERPL-MCNC: 0.02 NG/ML
SODIUM SERPL-SCNC: 141 MMOL/L

## 2021-05-24 ENCOUNTER — APPOINTMENT (OUTPATIENT)
Dept: CT IMAGING | Facility: CLINIC | Age: 66
End: 2021-05-24
Payer: MEDICARE

## 2021-05-24 ENCOUNTER — APPOINTMENT (OUTPATIENT)
Dept: NUCLEAR MEDICINE | Facility: CLINIC | Age: 66
End: 2021-05-24
Payer: MEDICARE

## 2021-05-24 ENCOUNTER — OUTPATIENT (OUTPATIENT)
Dept: OUTPATIENT SERVICES | Facility: HOSPITAL | Age: 66
LOS: 1 days | End: 2021-05-24

## 2021-05-24 ENCOUNTER — RESULT REVIEW (OUTPATIENT)
Age: 66
End: 2021-05-24

## 2021-05-24 DIAGNOSIS — Z98.890 OTHER SPECIFIED POSTPROCEDURAL STATES: Chronic | ICD-10-CM

## 2021-05-24 DIAGNOSIS — Z96.7 PRESENCE OF OTHER BONE AND TENDON IMPLANTS: Chronic | ICD-10-CM

## 2021-05-24 DIAGNOSIS — C61 MALIGNANT NEOPLASM OF PROSTATE: ICD-10-CM

## 2021-05-24 PROCEDURE — 74177 CT ABD & PELVIS W/CONTRAST: CPT | Mod: 26

## 2021-05-24 PROCEDURE — 78306 BONE IMAGING WHOLE BODY: CPT | Mod: 26

## 2021-05-24 PROCEDURE — 78830 RP LOCLZJ TUM SPECT W/CT 1: CPT | Mod: 26

## 2021-08-10 ENCOUNTER — OUTPATIENT (OUTPATIENT)
Dept: OUTPATIENT SERVICES | Facility: HOSPITAL | Age: 66
LOS: 1 days | Discharge: HOME | End: 2021-08-10

## 2021-08-10 DIAGNOSIS — Z96.7 PRESENCE OF OTHER BONE AND TENDON IMPLANTS: Chronic | ICD-10-CM

## 2021-08-10 DIAGNOSIS — C61 MALIGNANT NEOPLASM OF PROSTATE: ICD-10-CM

## 2021-08-10 DIAGNOSIS — Z98.890 OTHER SPECIFIED POSTPROCEDURAL STATES: Chronic | ICD-10-CM

## 2021-08-11 ENCOUNTER — APPOINTMENT (OUTPATIENT)
Dept: HEMATOLOGY ONCOLOGY | Facility: CLINIC | Age: 66
End: 2021-08-11
Payer: MEDICARE

## 2021-08-11 VITALS
OXYGEN SATURATION: 97 % | TEMPERATURE: 98 F | WEIGHT: 160 LBS | RESPIRATION RATE: 16 BRPM | SYSTOLIC BLOOD PRESSURE: 142 MMHG | HEART RATE: 70 BPM | BODY MASS INDEX: 25.71 KG/M2 | HEIGHT: 66 IN | DIASTOLIC BLOOD PRESSURE: 85 MMHG

## 2021-08-11 DIAGNOSIS — R97.20 ELEVATED PROSTATE, SPECIFIC ANTIGEN [PSA]: ICD-10-CM

## 2021-08-11 PROCEDURE — 99213 OFFICE O/P EST LOW 20 MIN: CPT

## 2021-08-12 PROBLEM — R97.20 ELEVATED PROSTATE SPECIFIC ANTIGEN (PSA): Status: ACTIVE | Noted: 2018-01-18

## 2021-08-12 NOTE — ASSESSMENT
[FreeTextEntry1] : \par \par Prostate cancer, with PSA recurrence, now treating with intermittent androgen deprivation - restarted Lupron 2/20, through 10/20, now to continue holding lupron until PSA has risen back to around 1- 2.\par \par 7/21 :  PSA 0.08.\par

## 2021-08-12 NOTE — HISTORY OF PRESENT ILLNESS
[de-identified] : \par \par Mr. Singleton is a 66 year old male following up for prostate cancer. He saw Dr. García for PSA elevation from 5 in 2017 to 9 in March 2018. Exam showed a hard stony prostate, and transrectal ultrasound-guided biopsy confirmed [place] 9 adenocarcinoma of the prostate with perineural invasion. He is s/p robotic-assisted radical prostatectomy with Dr. Mckenna at [facility] on 3/14/18, with pathology identical to original: [place]itya 9 prostate adenCa with extensive perineural invasion, sravani-prostatic spread in soft tissue surrounding seminal vesicles, bilateral extensive seminal vesicle involvement and tumor within a few cell layers of bladder base. Soft tissue margins of both seminal vesicles were positive, 4 pelvic nodes were negative. Post-op PSA on 4/10/18 was 1.57. Started on Lupron in April 2018 and treated with external beam radiation in May 2018. Due to intolerable side effects (fatigue, muscle weakness, sexual dysfunction) Jaden decided to hold Lupron in December 2018, at which point his PSA was <0.01. He is now treated with intermittent androgen deprivation, after PSA anup to 2.0\par . Last Lupron injection was 7.5 mg on October 9 2020.\par . PSA has been less than 0.01, and we have been able to hold Lupron until the PSA rises to greater than 1. \par  \par  [de-identified] : Feels well.\par CT 5/26/21: JOSE\par PSA 7/9/21 : 0.08\par \par

## 2021-11-08 ENCOUNTER — OUTPATIENT (OUTPATIENT)
Dept: OUTPATIENT SERVICES | Facility: HOSPITAL | Age: 66
LOS: 1 days | Discharge: HOME | End: 2021-11-08

## 2021-11-08 DIAGNOSIS — Z98.890 OTHER SPECIFIED POSTPROCEDURAL STATES: Chronic | ICD-10-CM

## 2021-11-08 DIAGNOSIS — C61 MALIGNANT NEOPLASM OF PROSTATE: ICD-10-CM

## 2021-11-08 DIAGNOSIS — Z96.7 PRESENCE OF OTHER BONE AND TENDON IMPLANTS: Chronic | ICD-10-CM

## 2021-11-10 ENCOUNTER — APPOINTMENT (OUTPATIENT)
Dept: HEMATOLOGY ONCOLOGY | Facility: CLINIC | Age: 66
End: 2021-11-10
Payer: MEDICARE

## 2021-11-10 ENCOUNTER — RESULT REVIEW (OUTPATIENT)
Age: 66
End: 2021-11-10

## 2021-11-10 VITALS
WEIGHT: 160.01 LBS | BODY MASS INDEX: 25.72 KG/M2 | HEART RATE: 72 BPM | SYSTOLIC BLOOD PRESSURE: 145 MMHG | OXYGEN SATURATION: 96 % | HEIGHT: 66 IN | DIASTOLIC BLOOD PRESSURE: 92 MMHG

## 2021-11-10 LAB
ALBUMIN SERPL ELPH-MCNC: 4.7 G/DL
ALP BLD-CCNC: 113 U/L
ALT SERPL-CCNC: 19 U/L
ANION GAP SERPL CALC-SCNC: 13 MMOL/L
AST SERPL-CCNC: 20 U/L
BASOPHILS # BLD AUTO: 0.1 K/UL — SIGNIFICANT CHANGE UP (ref 0–0.2)
BASOPHILS NFR BLD AUTO: 1 % — SIGNIFICANT CHANGE UP (ref 0–2)
BILIRUB SERPL-MCNC: 0.8 MG/DL
BUN SERPL-MCNC: 13 MG/DL
CALCIUM SERPL-MCNC: 9.7 MG/DL
CHLORIDE SERPL-SCNC: 102 MMOL/L
CO2 SERPL-SCNC: 24 MMOL/L
CREAT SERPL-MCNC: 0.91 MG/DL
EOSINOPHIL # BLD AUTO: 0.3 K/UL — SIGNIFICANT CHANGE UP (ref 0–0.5)
EOSINOPHIL NFR BLD AUTO: 6.7 % — HIGH (ref 0–6)
GLUCOSE SERPL-MCNC: 101 MG/DL
HCT VFR BLD CALC: 44.4 % — SIGNIFICANT CHANGE UP (ref 39–50)
HGB BLD-MCNC: 15.1 G/DL — SIGNIFICANT CHANGE UP (ref 13–17)
LYMPHOCYTES # BLD AUTO: 1.1 K/UL — SIGNIFICANT CHANGE UP (ref 1–3.3)
LYMPHOCYTES # BLD AUTO: 22.3 % — SIGNIFICANT CHANGE UP (ref 13–44)
MCHC RBC-ENTMCNC: 31.6 PG — SIGNIFICANT CHANGE UP (ref 27–34)
MCHC RBC-ENTMCNC: 34 G/DL — SIGNIFICANT CHANGE UP (ref 32–36)
MCV RBC AUTO: 92.7 FL — SIGNIFICANT CHANGE UP (ref 80–100)
MONOCYTES # BLD AUTO: 0.4 K/UL — SIGNIFICANT CHANGE UP (ref 0–0.9)
MONOCYTES NFR BLD AUTO: 9 % — SIGNIFICANT CHANGE UP (ref 2–14)
NEUTROPHILS # BLD AUTO: 2.9 K/UL — SIGNIFICANT CHANGE UP (ref 1.8–7.4)
NEUTROPHILS NFR BLD AUTO: 61 % — SIGNIFICANT CHANGE UP (ref 43–77)
PLATELET # BLD AUTO: 233 K/UL — SIGNIFICANT CHANGE UP (ref 150–400)
POTASSIUM SERPL-SCNC: 4.9 MMOL/L
PROT SERPL-MCNC: 6.8 G/DL
PSA SERPL-MCNC: 1.12 NG/ML
RBC # BLD: 4.79 M/UL — SIGNIFICANT CHANGE UP (ref 4.2–5.8)
RBC # FLD: 10.4 % — SIGNIFICANT CHANGE UP (ref 10.3–14.5)
SODIUM SERPL-SCNC: 139 MMOL/L
WBC # BLD: 4.8 K/UL — SIGNIFICANT CHANGE UP (ref 3.8–10.5)
WBC # FLD AUTO: 4.8 K/UL — SIGNIFICANT CHANGE UP (ref 3.8–10.5)

## 2021-11-10 PROCEDURE — 99213 OFFICE O/P EST LOW 20 MIN: CPT

## 2021-11-10 NOTE — ASSESSMENT
[FreeTextEntry1] : \par \par Prostate cancer, with PSA recurrence, now treating with intermittent androgen deprivation - restarted Lupron 2/20, through 10/20, now to continue holding lupron until PSA has risen back to around 1- 2.\par \par 7/21 : PSA 0.08.\par 11/10/21: back up to 1.12\par \par Will restart Lupron.\par  \par

## 2021-11-10 NOTE — HISTORY OF PRESENT ILLNESS
[de-identified] : \par \par Mr. Singleton is a 66 year old male following up for prostate cancer. He saw Dr. García for PSA elevation from 5 in 2017 to 9 in March 2018. Exam showed a hard stony prostate, and transrectal ultrasound-guided biopsy confirmed [place] 9 adenocarcinoma of the prostate with perineural invasion. He is s/p robotic-assisted radical prostatectomy with Dr. Mckenna at [facility] on 3/14/18, with pathology identical to original: [place]itya 9 prostate adenCa with extensive perineural invasion, sravani-prostatic spread in soft tissue surrounding seminal vesicles, bilateral extensive seminal vesicle involvement and tumor within a few cell layers of bladder base. Soft tissue margins of both seminal vesicles were positive, 4 pelvic nodes were negative. Post-op PSA on 4/10/18 was 1.57. Started on Lupron in April 2018 and treated with external beam radiation in May 2018. Due to intolerable side effects (fatigue, muscle weakness, sexual dysfunction) Jaden decided to hold Lupron in December 2018, at which point his PSA was <0.01. He is now treated with intermittent androgen deprivation, after PSA anup to 2.0\par . Last Lupron injection was 7.5 mg on October 9 2020.\par . PSA has been less than 0.01, and we have been able to hold Lupron until the PSA rises to greater than 1. \par  [de-identified] : Feels well, with no complaints.\par However today's PSA has risen to 1.12

## 2021-11-16 ENCOUNTER — APPOINTMENT (OUTPATIENT)
Dept: NEUROSURGERY | Facility: CLINIC | Age: 66
End: 2021-11-16
Payer: MEDICARE

## 2021-11-16 VITALS
HEART RATE: 70 BPM | DIASTOLIC BLOOD PRESSURE: 104 MMHG | WEIGHT: 160 LBS | HEIGHT: 67 IN | OXYGEN SATURATION: 98 % | SYSTOLIC BLOOD PRESSURE: 164 MMHG | TEMPERATURE: 97.2 F | BODY MASS INDEX: 25.11 KG/M2

## 2021-11-16 DIAGNOSIS — Z82.49 FAMILY HISTORY OF ISCHEMIC HEART DISEASE AND OTHER DISEASES OF THE CIRCULATORY SYSTEM: ICD-10-CM

## 2021-11-16 DIAGNOSIS — M50.20 OTHER CERVICAL DISC DISPLACEMENT, UNSPECIFIED CERVICAL REGION: ICD-10-CM

## 2021-11-16 DIAGNOSIS — Z85.46 PERSONAL HISTORY OF MALIGNANT NEOPLASM OF PROSTATE: ICD-10-CM

## 2021-11-16 DIAGNOSIS — Z86.59 PERSONAL HISTORY OF OTHER MENTAL AND BEHAVIORAL DISORDERS: ICD-10-CM

## 2021-11-16 PROCEDURE — 99213 OFFICE O/P EST LOW 20 MIN: CPT

## 2021-11-16 NOTE — ASSESSMENT
[FreeTextEntry1] : Patient with above history and scanned imaging. Disc herniation at C6-7, no neurological deficits. Advised to do  physical therapy for strengthening and to decrease pain modalities and core strengthening. He will call if he would like a script.\par \par Plan:\par follow up with Neurology for hand shakiness\par f/u here PRN\par Patient knows to call the office if there are any new or worsening symptoms. \par All questions and concerns answered and addressed in detail to patient's complete satisfaction. Patient verbalized understanding and agreed to plan.\par \par

## 2021-11-16 NOTE — HISTORY OF PRESENT ILLNESS
[> 3 months] : more  than 3 months [FreeTextEntry1] : Intermittent neck shock [de-identified] : DAMIEN KAY is a 66 year old male who presents for initial neurosurgical evaluation of what feels like tasers shooting in his neck. He states that a few months ago he would get a quick shock in his neck that last for a few seconds and would go away. He denies pain, numbness/tingling, incontinence, loss of  strength or muscle weakness. He had elbow surgery in 2007 where his hand is numb at times but that is not something new. He is a weight  and sees a Dr. Grijalva for pain management who gives him oxycodone. Wife states that his hands shake now, so much so that he is unable to mow the lawn. He states this problem with shakiness in his right arm has gone on for years. He is seeing a Neurologist for this issue. He has not done any physical therapy and is not interested in doing PT.

## 2021-11-16 NOTE — PHYSICAL EXAM
[General Appearance - Alert] : alert [General Appearance - In No Acute Distress] : in no acute distress [General Appearance - Well Nourished] : well nourished [Oriented To Time, Place, And Person] : oriented to person, place, and time [Impaired Insight] : insight and judgment were intact [Affect] : the affect was normal [Motor Tone] : muscle tone was normal in all four extremities [Motor Strength] : muscle strength was normal in all four extremities [Sensation Tactile Decrease] : light touch was intact [Sensation Pain / Temperature Decrease] : pain and temperature was intact [Balance] : balance was intact [2+] : Patella left 2+ [No Visual Abnormalities] : no visible abnormailities [No Tenderness to Palpation] : no spine tenderness on palpation [Normal] : normal [Able to toe walk] : the patient was able to toe walk [Able to heel walk] : the patient was able to heel walk [Sclera] : the sclera and conjunctiva were normal [PERRL With Normal Accommodation] : pupils were equal in size, round, reactive to light, with normal accommodation [Extraocular Movements] : extraocular movements were intact [Neck Appearance] : the appearance of the neck was normal [] : no respiratory distress [No Spinal Tenderness] : no spinal tenderness [Abnormal Walk] : normal gait [Involuntary Movements] : no involuntary movements were seen

## 2021-11-16 NOTE — REASON FOR VISIT
[New Patient Visit] : a new patient visit [Spouse] : spouse [FreeTextEntry1] : Taser feeling in neck

## 2021-11-19 ENCOUNTER — APPOINTMENT (OUTPATIENT)
Age: 66
End: 2021-11-19

## 2022-01-04 ENCOUNTER — TRANSCRIPTION ENCOUNTER (OUTPATIENT)
Age: 67
End: 2022-01-04

## 2022-02-16 ENCOUNTER — OUTPATIENT (OUTPATIENT)
Dept: OUTPATIENT SERVICES | Facility: HOSPITAL | Age: 67
LOS: 1 days | Discharge: HOME | End: 2022-02-16

## 2022-02-16 DIAGNOSIS — Z96.7 PRESENCE OF OTHER BONE AND TENDON IMPLANTS: Chronic | ICD-10-CM

## 2022-02-16 DIAGNOSIS — C61 MALIGNANT NEOPLASM OF PROSTATE: ICD-10-CM

## 2022-02-16 DIAGNOSIS — R97.20 ELEVATED PROSTATE SPECIFIC ANTIGEN [PSA]: ICD-10-CM

## 2022-02-16 DIAGNOSIS — Z98.890 OTHER SPECIFIED POSTPROCEDURAL STATES: Chronic | ICD-10-CM

## 2022-02-18 ENCOUNTER — RESULT REVIEW (OUTPATIENT)
Age: 67
End: 2022-02-18

## 2022-02-18 ENCOUNTER — APPOINTMENT (OUTPATIENT)
Dept: HEMATOLOGY ONCOLOGY | Facility: CLINIC | Age: 67
End: 2022-02-18
Payer: MEDICARE

## 2022-02-18 ENCOUNTER — APPOINTMENT (OUTPATIENT)
Age: 67
End: 2022-02-18

## 2022-02-18 VITALS
HEART RATE: 71 BPM | SYSTOLIC BLOOD PRESSURE: 160 MMHG | HEIGHT: 67 IN | WEIGHT: 157 LBS | BODY MASS INDEX: 24.64 KG/M2 | DIASTOLIC BLOOD PRESSURE: 103 MMHG | OXYGEN SATURATION: 96 %

## 2022-02-18 LAB
ALBUMIN SERPL ELPH-MCNC: 4.9 G/DL
ALP BLD-CCNC: 100 U/L
ALT SERPL-CCNC: 20 U/L
ANION GAP SERPL CALC-SCNC: 16 MMOL/L
AST SERPL-CCNC: 19 U/L
BASOPHILS # BLD AUTO: 0 K/UL — SIGNIFICANT CHANGE UP (ref 0–0.2)
BASOPHILS NFR BLD AUTO: 0.7 % — SIGNIFICANT CHANGE UP (ref 0–2)
BILIRUB SERPL-MCNC: 0.7 MG/DL
BUN SERPL-MCNC: 18 MG/DL
CALCIUM SERPL-MCNC: 9.7 MG/DL
CHLORIDE SERPL-SCNC: 107 MMOL/L
CO2 SERPL-SCNC: 22 MMOL/L
CREAT SERPL-MCNC: 0.79 MG/DL
EOSINOPHIL # BLD AUTO: 0.2 K/UL — SIGNIFICANT CHANGE UP (ref 0–0.5)
EOSINOPHIL NFR BLD AUTO: 2.5 % — SIGNIFICANT CHANGE UP (ref 0–6)
GLUCOSE SERPL-MCNC: 112 MG/DL
HCT VFR BLD CALC: 39.9 % — SIGNIFICANT CHANGE UP (ref 39–50)
HGB BLD-MCNC: 13.8 G/DL — SIGNIFICANT CHANGE UP (ref 13–17)
LYMPHOCYTES # BLD AUTO: 0.9 K/UL — LOW (ref 1–3.3)
LYMPHOCYTES # BLD AUTO: 14.8 % — SIGNIFICANT CHANGE UP (ref 13–44)
MCHC RBC-ENTMCNC: 31.2 PG — SIGNIFICANT CHANGE UP (ref 27–34)
MCHC RBC-ENTMCNC: 34.6 G/DL — SIGNIFICANT CHANGE UP (ref 32–36)
MCV RBC AUTO: 90.2 FL — SIGNIFICANT CHANGE UP (ref 80–100)
MONOCYTES # BLD AUTO: 0.5 K/UL — SIGNIFICANT CHANGE UP (ref 0–0.9)
MONOCYTES NFR BLD AUTO: 8.1 % — SIGNIFICANT CHANGE UP (ref 2–14)
NEUTROPHILS # BLD AUTO: 4.5 K/UL — SIGNIFICANT CHANGE UP (ref 1.8–7.4)
NEUTROPHILS NFR BLD AUTO: 73.9 % — SIGNIFICANT CHANGE UP (ref 43–77)
PLATELET # BLD AUTO: 238 K/UL — SIGNIFICANT CHANGE UP (ref 150–400)
POTASSIUM SERPL-SCNC: 4.4 MMOL/L
PROT SERPL-MCNC: 7 G/DL
PSA SERPL-MCNC: <0.01 NG/ML
RBC # BLD: 4.42 M/UL — SIGNIFICANT CHANGE UP (ref 4.2–5.8)
RBC # FLD: 11.4 % — SIGNIFICANT CHANGE UP (ref 10.3–14.5)
SODIUM SERPL-SCNC: 144 MMOL/L
WBC # BLD: 6.1 K/UL — SIGNIFICANT CHANGE UP (ref 3.8–10.5)
WBC # FLD AUTO: 6.1 K/UL — SIGNIFICANT CHANGE UP (ref 3.8–10.5)

## 2022-02-18 PROCEDURE — 99213 OFFICE O/P EST LOW 20 MIN: CPT

## 2022-02-18 NOTE — ASSESSMENT
[FreeTextEntry1] : Prostate cancer, with PSA recurrence, now treating with intermittent androgen deprivation - restarted Lupron 2/20, through 10/20, now to continue holding lupron until PSA has risen back to around 1- 2.\par \par 7/21 : PSA 0.08.\par 11/10/21: back up to 1.12\par \par - Patient resumed Lupron on 11/19/21, will receive injection today\par - PSA/CMP drawn\par - Advised is GI symptoms worsens to f/u with GI. Continue Imodium\par - F/U in 3 months\par  \par

## 2022-02-18 NOTE — HISTORY OF PRESENT ILLNESS
[de-identified] : \par \par Mr. Singleton is a 66 year old male following up for prostate cancer. He saw Dr. García for PSA elevation from 5 in 2017 to 9 in March 2018. Exam showed a hard stony prostate, and transrectal ultrasound-guided biopsy confirmed [place] 9 adenocarcinoma of the prostate with perineural invasion. He is s/p robotic-assisted radical prostatectomy with Dr. Mckenna at [facility] on 3/14/18, with pathology identical to original: [place] 9 prostate adenCa with extensive perineural invasion, sravani-prostatic spread in soft tissue surrounding seminal vesicles, bilateral extensive seminal vesicle involvement and tumor within a few cell layers of bladder base. Soft tissue margins of both seminal vesicles were positive, 4 pelvic nodes were negative. Post-op PSA on 4/10/18 was 1.57. Started on Lupron in April 2018 and treated with external beam radiation in May 2018. Due to intolerable side effects (fatigue, muscle weakness, sexual dysfunction) Jaden decided to hold Lupron in December 2018, at which point his PSA was <0.01. He is now treated with intermittent androgen deprivation, after PSA anup to 2.0\par . Last Lupron injection was 7.5 mg on October 9 2020.\par . PSA has been less than 0.01, and we have been able to hold Lupron until the PSA rises to greater than 1. \par  [de-identified] : Patient here for follow up.\par Patient resumed Lupron on 11/19/21 due to rise of PSA to 1.12\par  Patient noticing soft BM for the past 2 months. States he has about 2 episodes a day. Patient taking 1 Imodium with relief.\par Patient also wishes to return to the gym.  \par Denies fever/chills, fatigue,rash, mouth sores, nausea, vomiting, constipation,  abdominal pain, bleeding, easy bruising or visual changes, chest pain, cough, SOB or BRIGHT,  neuropathy, LE edema.\par

## 2022-02-18 NOTE — REVIEW OF SYSTEMS
[Negative] : Allergic/Immunologic [Diarrhea] : diarrhea [Abdominal Pain] : no abdominal pain [Vomiting] : no vomiting [Constipation] : no constipation [FreeTextEntry8] : Slight hesitancy

## 2022-03-04 NOTE — PATIENT PROFILE ADULT. - HARM RISK FACTORS
Pharmacy faxed in a request for prior authorization on:    Medication: Fluocinonide   Dosage: 0.05% cream  Quantity requested:  30  Pharmacy for prescription has been selected.    Prior authorization request has been initiated and sent for completion.     no

## 2022-05-11 ENCOUNTER — OUTPATIENT (OUTPATIENT)
Dept: OUTPATIENT SERVICES | Facility: HOSPITAL | Age: 67
LOS: 1 days | Discharge: HOME | End: 2022-05-11

## 2022-05-11 DIAGNOSIS — R97.20 ELEVATED PROSTATE SPECIFIC ANTIGEN [PSA]: ICD-10-CM

## 2022-05-11 DIAGNOSIS — Z98.890 OTHER SPECIFIED POSTPROCEDURAL STATES: Chronic | ICD-10-CM

## 2022-05-11 DIAGNOSIS — C61 MALIGNANT NEOPLASM OF PROSTATE: ICD-10-CM

## 2022-05-11 DIAGNOSIS — Z96.7 PRESENCE OF OTHER BONE AND TENDON IMPLANTS: Chronic | ICD-10-CM

## 2022-05-18 ENCOUNTER — APPOINTMENT (OUTPATIENT)
Age: 67
End: 2022-05-18

## 2022-05-18 ENCOUNTER — APPOINTMENT (OUTPATIENT)
Dept: HEMATOLOGY ONCOLOGY | Facility: CLINIC | Age: 67
End: 2022-05-18
Payer: MEDICARE

## 2022-05-18 NOTE — REVIEW OF SYSTEMS
[Abdominal Pain] : no abdominal pain [Vomiting] : no vomiting [Constipation] : no constipation [Diarrhea] : diarrhea [Negative] : Allergic/Immunologic [FreeTextEntry8] : Slight hesitancy

## 2022-05-18 NOTE — ADDENDUM
[FreeTextEntry1] : Documented by Lorene Delacruz acting as scribe for Dr. Landers on 05/18/2022 \par \par All Medical record entries made by the Scribe were at my, Dr. Landers's, direction and personally dictated by me on 05/18/2022 . I have reviewed the chart and agree that the record accurately reflects my personal performance of the history, physical exam, assessment and plan. I have also personally directed, reviewed, and agreed with the discharge instructions.\par

## 2022-05-18 NOTE — HISTORY OF PRESENT ILLNESS
[de-identified] : \par \par Mr. Singleton is a 66 year old male following up for prostate cancer. He saw Dr. García for PSA elevation from 5 in 2017 to 9 in March 2018. Exam showed a hard stony prostate, and transrectal ultrasound-guided biopsy confirmed [place] 9 adenocarcinoma of the prostate with perineural invasion. He is s/p robotic-assisted radical prostatectomy with Dr. Mckenna at [facility] on 3/14/18, with pathology identical to original: [place] 9 prostate adenCa with extensive perineural invasion, sravani-prostatic spread in soft tissue surrounding seminal vesicles, bilateral extensive seminal vesicle involvement and tumor within a few cell layers of bladder base. Soft tissue margins of both seminal vesicles were positive, 4 pelvic nodes were negative. Post-op PSA on 4/10/18 was 1.57. Started on Lupron in April 2018 and treated with external beam radiation in May 2018. Due to intolerable side effects (fatigue, muscle weakness, sexual dysfunction) Jaden decided to hold Lupron in December 2018, at which point his PSA was <0.01. He is now treated with intermittent androgen deprivation, after PSA anup to 2.0\par . Last Lupron injection was 7.5 mg on October 9 2020.\par . PSA has been less than 0.01, and we have been able to hold Lupron until the PSA rises to greater than 1. \par  [de-identified] : Patient here for follow up.\par Patient resumed Lupron on 11/19/21 due to rise of PSA to 1.12\par  Patient noticing soft BM for the past 2 months. States he has about 2 episodes a day. Patient taking 1 Imodium with relief.\par Patient also wishes to return to the gym.  \par Denies fever/chills, fatigue,rash, mouth sores, nausea, vomiting, constipation,  abdominal pain, bleeding, easy bruising or visual changes, chest pain, cough, SOB or BRIGHT,  neuropathy, LE edema.\par

## 2022-05-23 ENCOUNTER — APPOINTMENT (OUTPATIENT)
Age: 67
End: 2022-05-23

## 2022-05-23 ENCOUNTER — APPOINTMENT (OUTPATIENT)
Dept: HEMATOLOGY ONCOLOGY | Facility: CLINIC | Age: 67
End: 2022-05-23
Payer: MEDICARE

## 2022-05-23 ENCOUNTER — RESULT REVIEW (OUTPATIENT)
Age: 67
End: 2022-05-23

## 2022-05-23 VITALS
WEIGHT: 158.01 LBS | OXYGEN SATURATION: 94 % | HEIGHT: 67 IN | DIASTOLIC BLOOD PRESSURE: 114 MMHG | SYSTOLIC BLOOD PRESSURE: 173 MMHG | BODY MASS INDEX: 24.8 KG/M2 | HEART RATE: 69 BPM

## 2022-05-23 LAB
ALBUMIN SERPL ELPH-MCNC: 4.6 G/DL
ALP BLD-CCNC: 93 U/L
ALT SERPL-CCNC: 15 U/L
ANION GAP SERPL CALC-SCNC: 13 MMOL/L
AST SERPL-CCNC: 18 U/L
BASOPHILS # BLD AUTO: 0 K/UL — SIGNIFICANT CHANGE UP (ref 0–0.2)
BASOPHILS NFR BLD AUTO: 0.8 % — SIGNIFICANT CHANGE UP (ref 0–2)
BILIRUB SERPL-MCNC: 0.6 MG/DL
BUN SERPL-MCNC: 22 MG/DL
CALCIUM SERPL-MCNC: 9 MG/DL
CHLORIDE SERPL-SCNC: 110 MMOL/L
CO2 SERPL-SCNC: 23 MMOL/L
CREAT SERPL-MCNC: 0.81 MG/DL
EGFR: 96 ML/MIN/1.73M2
EOSINOPHIL # BLD AUTO: 0.1 K/UL — SIGNIFICANT CHANGE UP (ref 0–0.5)
EOSINOPHIL NFR BLD AUTO: 2.9 % — SIGNIFICANT CHANGE UP (ref 0–6)
GLUCOSE SERPL-MCNC: 117 MG/DL
HCT VFR BLD CALC: 41.2 % — SIGNIFICANT CHANGE UP (ref 39–50)
HGB BLD-MCNC: 14.2 G/DL — SIGNIFICANT CHANGE UP (ref 13–17)
LYMPHOCYTES # BLD AUTO: 0.9 K/UL — LOW (ref 1–3.3)
LYMPHOCYTES # BLD AUTO: 18 % — SIGNIFICANT CHANGE UP (ref 13–44)
MCHC RBC-ENTMCNC: 31.3 PG — SIGNIFICANT CHANGE UP (ref 27–34)
MCHC RBC-ENTMCNC: 34.4 G/DL — SIGNIFICANT CHANGE UP (ref 32–36)
MCV RBC AUTO: 91 FL — SIGNIFICANT CHANGE UP (ref 80–100)
MONOCYTES # BLD AUTO: 0.4 K/UL — SIGNIFICANT CHANGE UP (ref 0–0.9)
MONOCYTES NFR BLD AUTO: 8.6 % — SIGNIFICANT CHANGE UP (ref 2–14)
NEUTROPHILS # BLD AUTO: 3.4 K/UL — SIGNIFICANT CHANGE UP (ref 1.8–7.4)
NEUTROPHILS NFR BLD AUTO: 69.7 % — SIGNIFICANT CHANGE UP (ref 43–77)
PLATELET # BLD AUTO: 217 K/UL — SIGNIFICANT CHANGE UP (ref 150–400)
POTASSIUM SERPL-SCNC: 4.2 MMOL/L
PROT SERPL-MCNC: 6.7 G/DL
PSA SERPL-MCNC: <0.01 NG/ML
RBC # BLD: 4.53 M/UL — SIGNIFICANT CHANGE UP (ref 4.2–5.8)
RBC # FLD: 11.1 % — SIGNIFICANT CHANGE UP (ref 10.3–14.5)
SODIUM SERPL-SCNC: 145 MMOL/L
WBC # BLD: 4.9 K/UL — SIGNIFICANT CHANGE UP (ref 3.8–10.5)
WBC # FLD AUTO: 4.9 K/UL — SIGNIFICANT CHANGE UP (ref 3.8–10.5)

## 2022-05-23 PROCEDURE — 99213 OFFICE O/P EST LOW 20 MIN: CPT

## 2022-05-23 NOTE — ASSESSMENT
[FreeTextEntry1] : Prostate cancer, with PSA recurrence, now treating with intermittent androgen deprivation - restarted Lupron 2/20, through 10/20, now to continue holding lupron until PSA has risen back to around 1- 2.\par \par 7/21 : PSA 0.08.\par 11/10/21: back up to 1.12 - back to<0.01 2/22 and 5/22.\par \par - Patient resumed Lupron on 11/19/21,,2/22, and will receive injection today (5/23/22)\par -Now follow off therapy once again - intermittent androgen deprivation.\par \par  \par

## 2022-05-23 NOTE — HISTORY OF PRESENT ILLNESS
[de-identified] : Mr. Singleton is a 67 year old male following up for prostate cancer. He saw Dr. García for PSA elevation from 5 in 2017 to 9 in March 2018. Exam showed a hard stony prostate, and transrectal ultrasound-guided biopsy confirmed [place] 9 adenocarcinoma of the prostate with perineural invasion. He is s/p robotic-assisted radical prostatectomy with Dr. Mckenna at [facility] on 3/14/18, with pathology identical to original: [place] 9 prostate adenCa with extensive perineural invasion, sravani-prostatic spread in soft tissue surrounding seminal vesicles, bilateral extensive seminal vesicle involvement and tumor within a few cell layers of bladder base. Soft tissue margins of both seminal vesicles were positive, 4 pelvic nodes were negative. Post-op PSA on 4/10/18 was 1.57. Started on Lupron in April 2018 and treated with external beam radiation in May 2018. Due to intolerable side effects (fatigue, muscle weakness, sexual dysfunction) Jaden decided to hold Lupron in December 2018, at which point his PSA was <0.01. He is now treated with intermittent androgen deprivation, after PSA anup to 2.0\par . Last Lupron injection was 7.5 mg on October 9 2020.\par . PSA had been less than 0.01, and were able to hold Lupron until the PSA anup to greater than 1. \par  \par Patient resumed Lupron on 11/19/21 due to rise of PSA to 1.12\par  \par  \par  [de-identified] : By February 18, 2022 PSA was back down to less than 0.01.\par A 3-month dose of Lupron was given on February 18 and another dose in our office today.\par The PSA remains less than 0.01.\par He will now be followed off therapy again

## 2022-08-10 ENCOUNTER — OUTPATIENT (OUTPATIENT)
Dept: OUTPATIENT SERVICES | Facility: HOSPITAL | Age: 67
LOS: 1 days | Discharge: HOME | End: 2022-08-10

## 2022-08-10 DIAGNOSIS — Z98.890 OTHER SPECIFIED POSTPROCEDURAL STATES: Chronic | ICD-10-CM

## 2022-08-10 DIAGNOSIS — Z96.7 PRESENCE OF OTHER BONE AND TENDON IMPLANTS: Chronic | ICD-10-CM

## 2022-08-10 DIAGNOSIS — C61 MALIGNANT NEOPLASM OF PROSTATE: ICD-10-CM

## 2022-08-10 DIAGNOSIS — R97.20 ELEVATED PROSTATE SPECIFIC ANTIGEN [PSA]: ICD-10-CM

## 2022-08-17 ENCOUNTER — RESULT REVIEW (OUTPATIENT)
Age: 67
End: 2022-08-17

## 2022-08-17 ENCOUNTER — APPOINTMENT (OUTPATIENT)
Age: 67
End: 2022-08-17

## 2022-08-17 LAB
BASOPHILS # BLD AUTO: 0.1 K/UL — SIGNIFICANT CHANGE UP (ref 0–0.2)
BASOPHILS NFR BLD AUTO: 0.9 % — SIGNIFICANT CHANGE UP (ref 0–2)
EOSINOPHIL # BLD AUTO: 0.2 K/UL — SIGNIFICANT CHANGE UP (ref 0–0.5)
EOSINOPHIL NFR BLD AUTO: 3.6 % — SIGNIFICANT CHANGE UP (ref 0–6)
HCT VFR BLD CALC: 42 % — SIGNIFICANT CHANGE UP (ref 39–50)
HGB BLD-MCNC: 14.2 G/DL — SIGNIFICANT CHANGE UP (ref 13–17)
LYMPHOCYTES # BLD AUTO: 1.1 K/UL — SIGNIFICANT CHANGE UP (ref 1–3.3)
LYMPHOCYTES # BLD AUTO: 17.9 % — SIGNIFICANT CHANGE UP (ref 13–44)
MCHC RBC-ENTMCNC: 30.9 PG — SIGNIFICANT CHANGE UP (ref 27–34)
MCHC RBC-ENTMCNC: 33.9 G/DL — SIGNIFICANT CHANGE UP (ref 32–36)
MCV RBC AUTO: 91 FL — SIGNIFICANT CHANGE UP (ref 80–100)
MONOCYTES # BLD AUTO: 0.4 K/UL — SIGNIFICANT CHANGE UP (ref 0–0.9)
MONOCYTES NFR BLD AUTO: 7.4 % — SIGNIFICANT CHANGE UP (ref 2–14)
NEUTROPHILS # BLD AUTO: 4.3 K/UL — SIGNIFICANT CHANGE UP (ref 1.8–7.4)
NEUTROPHILS NFR BLD AUTO: 70.3 % — SIGNIFICANT CHANGE UP (ref 43–77)
PLATELET # BLD AUTO: 276 K/UL — SIGNIFICANT CHANGE UP (ref 150–400)
RBC # BLD: 4.61 M/UL — SIGNIFICANT CHANGE UP (ref 4.2–5.8)
RBC # FLD: 11.9 % — SIGNIFICANT CHANGE UP (ref 10.3–14.5)
WBC # BLD: 6.1 K/UL — SIGNIFICANT CHANGE UP (ref 3.8–10.5)
WBC # FLD AUTO: 6.1 K/UL — SIGNIFICANT CHANGE UP (ref 3.8–10.5)

## 2022-08-18 LAB
ALBUMIN SERPL ELPH-MCNC: 5 G/DL
ALP BLD-CCNC: 104 U/L
ALT SERPL-CCNC: 23 U/L
ANION GAP SERPL CALC-SCNC: 13 MMOL/L
AST SERPL-CCNC: 22 U/L
BILIRUB SERPL-MCNC: 0.6 MG/DL
BUN SERPL-MCNC: 18 MG/DL
CALCIUM SERPL-MCNC: 9.7 MG/DL
CHLORIDE SERPL-SCNC: 104 MMOL/L
CO2 SERPL-SCNC: 25 MMOL/L
CREAT SERPL-MCNC: 0.93 MG/DL
EGFR: 89 ML/MIN/1.73M2
GLUCOSE SERPL-MCNC: 88 MG/DL
POTASSIUM SERPL-SCNC: 4.4 MMOL/L
PROT SERPL-MCNC: 7.1 G/DL
PSA SERPL-MCNC: <0.01 NG/ML
SODIUM SERPL-SCNC: 143 MMOL/L

## 2022-11-08 ENCOUNTER — OUTPATIENT (OUTPATIENT)
Dept: OUTPATIENT SERVICES | Facility: HOSPITAL | Age: 67
LOS: 1 days | Discharge: HOME | End: 2022-11-08

## 2022-11-08 DIAGNOSIS — Z96.7 PRESENCE OF OTHER BONE AND TENDON IMPLANTS: Chronic | ICD-10-CM

## 2022-11-08 DIAGNOSIS — Z98.890 OTHER SPECIFIED POSTPROCEDURAL STATES: Chronic | ICD-10-CM

## 2022-11-08 DIAGNOSIS — C61 MALIGNANT NEOPLASM OF PROSTATE: ICD-10-CM

## 2022-11-16 ENCOUNTER — APPOINTMENT (OUTPATIENT)
Dept: HEMATOLOGY ONCOLOGY | Facility: CLINIC | Age: 67
End: 2022-11-16

## 2022-11-16 ENCOUNTER — RESULT REVIEW (OUTPATIENT)
Age: 67
End: 2022-11-16

## 2022-11-16 ENCOUNTER — APPOINTMENT (OUTPATIENT)
Age: 67
End: 2022-11-16

## 2022-11-16 VITALS
HEIGHT: 67 IN | WEIGHT: 161.03 LBS | OXYGEN SATURATION: 94 % | SYSTOLIC BLOOD PRESSURE: 127 MMHG | HEART RATE: 73 BPM | DIASTOLIC BLOOD PRESSURE: 84 MMHG | BODY MASS INDEX: 25.27 KG/M2

## 2022-11-16 LAB
BASOPHILS # BLD AUTO: 0.1 K/UL — SIGNIFICANT CHANGE UP (ref 0–0.2)
BASOPHILS NFR BLD AUTO: 1 % — SIGNIFICANT CHANGE UP (ref 0–2)
EOSINOPHIL # BLD AUTO: 0.1 K/UL — SIGNIFICANT CHANGE UP (ref 0–0.5)
EOSINOPHIL NFR BLD AUTO: 1.3 % — SIGNIFICANT CHANGE UP (ref 0–6)
HCT VFR BLD CALC: 39.7 % — SIGNIFICANT CHANGE UP (ref 39–50)
HGB BLD-MCNC: 13.9 G/DL — SIGNIFICANT CHANGE UP (ref 13–17)
LYMPHOCYTES # BLD AUTO: 1.3 K/UL — SIGNIFICANT CHANGE UP (ref 1–3.3)
LYMPHOCYTES # BLD AUTO: 17 % — SIGNIFICANT CHANGE UP (ref 13–44)
MCHC RBC-ENTMCNC: 31.2 PG — SIGNIFICANT CHANGE UP (ref 27–34)
MCHC RBC-ENTMCNC: 35 G/DL — SIGNIFICANT CHANGE UP (ref 32–36)
MCV RBC AUTO: 89.2 FL — SIGNIFICANT CHANGE UP (ref 80–100)
MONOCYTES # BLD AUTO: 0.6 K/UL — SIGNIFICANT CHANGE UP (ref 0–0.9)
MONOCYTES NFR BLD AUTO: 7.5 % — SIGNIFICANT CHANGE UP (ref 2–14)
NEUTROPHILS # BLD AUTO: 5.4 K/UL — SIGNIFICANT CHANGE UP (ref 1.8–7.4)
NEUTROPHILS NFR BLD AUTO: 73.2 % — SIGNIFICANT CHANGE UP (ref 43–77)
PLATELET # BLD AUTO: 227 K/UL — SIGNIFICANT CHANGE UP (ref 150–400)
RBC # BLD: 4.45 M/UL — SIGNIFICANT CHANGE UP (ref 4.2–5.8)
RBC # FLD: 10.7 % — SIGNIFICANT CHANGE UP (ref 10.3–14.5)
WBC # BLD: 7.4 K/UL — SIGNIFICANT CHANGE UP (ref 3.8–10.5)
WBC # FLD AUTO: 7.4 K/UL — SIGNIFICANT CHANGE UP (ref 3.8–10.5)

## 2022-11-16 PROCEDURE — 99213 OFFICE O/P EST LOW 20 MIN: CPT

## 2022-11-17 LAB
ALBUMIN SERPL ELPH-MCNC: 4.5 G/DL
ALP BLD-CCNC: 101 U/L
ALT SERPL-CCNC: 18 U/L
ANION GAP SERPL CALC-SCNC: 14 MMOL/L
AST SERPL-CCNC: 17 U/L
BILIRUB SERPL-MCNC: 0.4 MG/DL
BUN SERPL-MCNC: 20 MG/DL
CALCIUM SERPL-MCNC: 9.5 MG/DL
CHLORIDE SERPL-SCNC: 107 MMOL/L
CO2 SERPL-SCNC: 21 MMOL/L
CREAT SERPL-MCNC: 1.05 MG/DL
EGFR: 77 ML/MIN/1.73M2
GLUCOSE SERPL-MCNC: 107 MG/DL
POTASSIUM SERPL-SCNC: 3.9 MMOL/L
PROT SERPL-MCNC: 6.8 G/DL
PSA SERPL-MCNC: <0.01 NG/ML
SODIUM SERPL-SCNC: 142 MMOL/L

## 2022-11-17 NOTE — ASSESSMENT
[FreeTextEntry1] : Prostate cancer, with PSA recurrence, now treating with intermittent androgen deprivation - restarted Lupron 2/20, through 10/20, now continuing at patient request.\par \par  PSA <0.01\par \par - Patient resumed Lupron on 11/19/21,,2/22,5/23/22, 8/1722 - continue q 3 month\par

## 2022-11-17 NOTE — ADDENDUM
[FreeTextEntry1] : Documented by Lorene Delacruz acting as scribe for Dr. Landers on 11/16/2022 \par \par All Medical record entries made by the Scribe were at my, Dr. Landers's, direction and personally dictated by me on 11/16/2022 . I have reviewed the chart and agree that the record accurately reflects my personal performance of the history, physical exam, assessment and plan. I have also personally directed, reviewed, and agreed with the discharge instructions.\par

## 2022-11-17 NOTE — HISTORY OF PRESENT ILLNESS
[de-identified] : Mr. Singleton is a 67 year old male following up for prostate cancer. He saw Dr. García for PSA elevation from 5 in 2017 to 9 in March 2018. 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 He is s/p robotic-assisted radical prostatectomy with Dr. Mckenna at [facility] on 3/14/18, with pathology identical to original: [place] 9 prostate adenCa with extensive perineural invasion, sravani-prostatic spread in soft tissue surrounding seminal vesicles, bilateral extensive seminal vesicle involvement and tumor within a few cell layers of bladder base. Soft tissue margins of both seminal vesicles were positive, 4 pelvic nodes were negative. Post-op PSA on 4/10/18 was 1.57. Started on Lupron in April 2018 and treated with external beam radiation in May 2018. Due to intolerable side effects (fatigue, muscle weakness, sexual dysfunction) Jaden decided to hold Lupron in December 2018, at which point his PSA was <0.01. He is now treated with intermittent androgen deprivation, after PSA anup to 2.0\par . Last Lupron injection was 7.5 mg on October 9 2020.\par . PSA had been less than 0.01, and were able to hold Lupron until the PSA anup to greater than 1. \par  \par Patient resumed Lupron on 11/19/21 due to rise of PSA to 1.12\par  \par  \par  [de-identified] : By February 18, 2022 PSA was back down to less than 0.01.\par A 3-month dose of Lupron was given on 8/17/22\par The PSA remains less than 0.01.\par \par Patient doing well \par Reports diarrhea after eating\par Reports increased protein intake\par

## 2022-12-01 VITALS — HEIGHT: 67 IN | WEIGHT: 160 LBS | BODY MASS INDEX: 25.11 KG/M2

## 2022-12-21 ENCOUNTER — NON-APPOINTMENT (OUTPATIENT)
Age: 67
End: 2022-12-21

## 2022-12-21 DIAGNOSIS — Z79.891 LONG TERM (CURRENT) USE OF OPIATE ANALGESIC: ICD-10-CM

## 2022-12-21 DIAGNOSIS — M79.641 PAIN IN RIGHT HAND: ICD-10-CM

## 2022-12-21 DIAGNOSIS — G89.29 OTHER CHRONIC PAIN: ICD-10-CM

## 2022-12-21 DIAGNOSIS — M79.644 PAIN IN RIGHT FINGER(S): ICD-10-CM

## 2022-12-21 DIAGNOSIS — M25.529 PAIN IN UNSPECIFIED ELBOW: ICD-10-CM

## 2023-01-04 ENCOUNTER — APPOINTMENT (OUTPATIENT)
Dept: PAIN MANAGEMENT | Facility: CLINIC | Age: 68
End: 2023-01-04
Payer: OTHER MISCELLANEOUS

## 2023-01-04 VITALS — HEIGHT: 67 IN | BODY MASS INDEX: 25.11 KG/M2 | WEIGHT: 160 LBS

## 2023-01-04 PROCEDURE — 99213 OFFICE O/P EST LOW 20 MIN: CPT | Mod: ACP,95

## 2023-01-04 NOTE — DISCUSSION/SUMMARY
[Medication Risks Reviewed] : Medication risks reviewed [de-identified] : Medications reviewed at current level.  reviewed. Opioid agreement on chart.

## 2023-01-04 NOTE — ASSESSMENT
[FreeTextEntry1] : Alert and oriented x3. Limited ROM to upper extremities. Notes sensitivity to right arm.

## 2023-01-04 NOTE — REASON FOR VISIT
[Follow-Up Visit] : a follow-up pain management visit [Home] : at home, [unfilled] , at the time of the visit. [Medical Office: ([facility])___] : at the medical office located in  [Patient] : the patient [Self] : self [FreeTextEntry2] : right arm CRPS pain
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
## 2023-01-30 ENCOUNTER — APPOINTMENT (OUTPATIENT)
Dept: PAIN MANAGEMENT | Facility: CLINIC | Age: 68
End: 2023-01-30
Payer: OTHER MISCELLANEOUS

## 2023-01-30 VITALS — BODY MASS INDEX: 25.11 KG/M2 | HEIGHT: 67 IN | WEIGHT: 160 LBS

## 2023-01-30 PROCEDURE — 99213 OFFICE O/P EST LOW 20 MIN: CPT | Mod: ACP,95

## 2023-01-30 NOTE — ASSESSMENT
[FreeTextEntry1] : Alert and oriented  X 3. Since last visit there are no changes to the patient's physical status.\par \par

## 2023-01-30 NOTE — HISTORY OF PRESENT ILLNESS
[Work related] : work related [Gradual] : gradual [5] : 5 [Burning] : burning [Dull/Aching] : dull/aching [Radiating] : radiating [Throbbing] : throbbing [Constant] : constant [Sleep] : sleep [Meds] : meds [Walking/activity] : walking/activity [Retired] : Work status: retired [Steroid] : Steroid [] : no [FreeTextEntry1] : RT ELBOW  [FreeTextEntry3] : 03/05/2005 [FreeTextEntry5] : CUT NERVE IN ARM DURING OPERATION FOR ELBOW  [FreeTextEntry7] :  UP AND DOWN TO HANDS NUMBNESS  [de-identified] : WEATHER  [de-identified] : NEWS DAY  [de-identified] : CERVICAL  [de-identified] : NERVE BLOCK  [TWNoteComboBox1] : 50%

## 2023-01-30 NOTE — REASON FOR VISIT
[Follow-Up Visit] : a follow-up pain management visit [Home] : at home, [unfilled] , at the time of the visit. [Medical Office: ([facility])___] : at the medical office located in  [Patient] : the patient [Self] : self [FreeTextEntry2] : Right upper extremity CRPS pain

## 2023-01-30 NOTE — DISCUSSION/SUMMARY
[Medication Risks Reviewed] : Medication risks reviewed [de-identified] : Prescriptions renewed. Opioid [place]obtained [place] chart [place] and [place]. SOAPP-R completed on chart. The patient's medications are documented to the best of their ability. Quality of life and functional ability improved on medications. The patient is showing no aberrant behavior or evidence of diversion. The patient was advised not to use narcotic medication while operating an automobile or heavy machinery due to potential sedation or dizziness. The patient was educated to the risks associated with potential opioid dependence and addiction. Urine toxicology screens as per office protocol. Use of multimodal analgesia used prn.\par Follow up one month.

## 2023-02-07 ENCOUNTER — OUTPATIENT (OUTPATIENT)
Dept: OUTPATIENT SERVICES | Facility: HOSPITAL | Age: 68
LOS: 1 days | Discharge: HOME | End: 2023-02-07

## 2023-02-07 DIAGNOSIS — Z98.890 OTHER SPECIFIED POSTPROCEDURAL STATES: Chronic | ICD-10-CM

## 2023-02-07 DIAGNOSIS — C61 MALIGNANT NEOPLASM OF PROSTATE: ICD-10-CM

## 2023-02-07 DIAGNOSIS — Z96.7 PRESENCE OF OTHER BONE AND TENDON IMPLANTS: Chronic | ICD-10-CM

## 2023-02-15 ENCOUNTER — APPOINTMENT (OUTPATIENT)
Age: 68
End: 2023-02-15

## 2023-02-15 ENCOUNTER — RESULT REVIEW (OUTPATIENT)
Age: 68
End: 2023-02-15

## 2023-02-15 ENCOUNTER — APPOINTMENT (OUTPATIENT)
Dept: HEMATOLOGY ONCOLOGY | Facility: CLINIC | Age: 68
End: 2023-02-15
Payer: MEDICARE

## 2023-02-15 VITALS
TEMPERATURE: 97.1 F | HEART RATE: 80 BPM | DIASTOLIC BLOOD PRESSURE: 102 MMHG | SYSTOLIC BLOOD PRESSURE: 167 MMHG | BODY MASS INDEX: 25.11 KG/M2 | WEIGHT: 160 LBS | HEIGHT: 67 IN | OXYGEN SATURATION: 95 %

## 2023-02-15 LAB
ALBUMIN SERPL ELPH-MCNC: 5.1 G/DL
ALP BLD-CCNC: 93 U/L
ALT SERPL-CCNC: 18 U/L
ANION GAP SERPL CALC-SCNC: 14 MMOL/L
AST SERPL-CCNC: 20 U/L
BASOPHILS # BLD AUTO: 0.1 K/UL — SIGNIFICANT CHANGE UP (ref 0–0.2)
BASOPHILS NFR BLD AUTO: 0.9 % — SIGNIFICANT CHANGE UP (ref 0–2)
BILIRUB SERPL-MCNC: 0.7 MG/DL
BUN SERPL-MCNC: 28 MG/DL
CALCIUM SERPL-MCNC: 9.7 MG/DL
CHLORIDE SERPL-SCNC: 106 MMOL/L
CO2 SERPL-SCNC: 21 MMOL/L
CREAT SERPL-MCNC: 0.81 MG/DL
EGFR: 96 ML/MIN/1.73M2
EOSINOPHIL # BLD AUTO: 0.2 K/UL — SIGNIFICANT CHANGE UP (ref 0–0.5)
EOSINOPHIL NFR BLD AUTO: 2.4 % — SIGNIFICANT CHANGE UP (ref 0–6)
GLUCOSE SERPL-MCNC: 125 MG/DL
HCT VFR BLD CALC: 39.5 % — SIGNIFICANT CHANGE UP (ref 39–50)
HGB BLD-MCNC: 14.5 G/DL — SIGNIFICANT CHANGE UP (ref 13–17)
LYMPHOCYTES # BLD AUTO: 1.2 K/UL — SIGNIFICANT CHANGE UP (ref 1–3.3)
LYMPHOCYTES # BLD AUTO: 15.9 % — SIGNIFICANT CHANGE UP (ref 13–44)
MCHC RBC-ENTMCNC: 31.3 PG — SIGNIFICANT CHANGE UP (ref 27–34)
MCHC RBC-ENTMCNC: 36.7 G/DL — HIGH (ref 32–36)
MCV RBC AUTO: 85.4 FL — SIGNIFICANT CHANGE UP (ref 80–100)
MONOCYTES # BLD AUTO: 0.4 K/UL — SIGNIFICANT CHANGE UP (ref 0–0.9)
MONOCYTES NFR BLD AUTO: 5.8 % — SIGNIFICANT CHANGE UP (ref 2–14)
NEUTROPHILS # BLD AUTO: 5.5 K/UL — SIGNIFICANT CHANGE UP (ref 1.8–7.4)
NEUTROPHILS NFR BLD AUTO: 75.1 % — SIGNIFICANT CHANGE UP (ref 43–77)
PLATELET # BLD AUTO: 244 K/UL — SIGNIFICANT CHANGE UP (ref 150–400)
POTASSIUM SERPL-SCNC: 4.1 MMOL/L
PROT SERPL-MCNC: 6.9 G/DL
PSA SERPL-MCNC: <0.01 NG/ML
RBC # BLD: 4.62 M/UL — SIGNIFICANT CHANGE UP (ref 4.2–5.8)
RBC # FLD: 11.3 % — SIGNIFICANT CHANGE UP (ref 10.3–14.5)
SODIUM SERPL-SCNC: 142 MMOL/L
WBC # BLD: 7.3 K/UL — SIGNIFICANT CHANGE UP (ref 3.8–10.5)
WBC # FLD AUTO: 7.3 K/UL — SIGNIFICANT CHANGE UP (ref 3.8–10.5)

## 2023-02-15 PROCEDURE — 99213 OFFICE O/P EST LOW 20 MIN: CPT

## 2023-02-16 NOTE — HISTORY OF PRESENT ILLNESS
[de-identified] : \par Mr. Singleton is a 67 year old male following up for prostate cancer. He saw Dr. García for PSA elevation from 5 in 2017 to 9 in March 2018. 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 He is s/p robotic-assisted radical prostatectomy with Dr. Mckenna at [facility] on 3/14/18, with pathology identical to original: [place] 9 prostate adenCa with extensive perineural invasion, sravani-prostatic spread in soft tissue surrounding seminal vesicles, bilateral extensive seminal vesicle involvement and tumor within a few cell layers of bladder base. Soft tissue margins of both seminal vesicles were positive, 4 pelvic nodes were negative. Post-op PSA on 4/10/18 was 1.57. Started on Lupron in April 2018 and treated with external beam radiation in May 2018. Due to intolerable side effects (fatigue, muscle weakness, sexual dysfunction) Jaden decided to hold Lupron in December 2018, at which point his PSA was <0.01. He is now treated with intermittent androgen deprivation, after PSA anup to 2.0\par . Last Lupron injection was 7.5 mg on October 9 2020.\par . PSA had been less than 0.01, and were able to hold Lupron until the PSA anup to greater than 1. \par  \par Patient resumed Lupron on 11/19/21 due to rise of PSA to 1.12\par  \par  \par . By February 18, 2022 PSA was back down to less than 0.01.\par A 3-month dose of Lupron was given on 8/17/22\par The PSA remains less than 0.01.\par

## 2023-02-16 NOTE — ASSESSMENT
[FreeTextEntry1] : \par Prostate cancer, with PSA recurrence, now treating with intermittent androgen deprivation - restarted Lupron 2/20, through 10/20, now continuing at patient request.\par \par  PSA <0.01\par \par - Patient resumed Lupron on 11/19/21,2/22,5/23/22, 8/1722 - continue q 3 month -patient prefers continuous ADT.\par . \par

## 2023-02-27 ENCOUNTER — APPOINTMENT (OUTPATIENT)
Dept: PAIN MANAGEMENT | Facility: CLINIC | Age: 68
End: 2023-02-27
Payer: OTHER MISCELLANEOUS

## 2023-02-27 VITALS — BODY MASS INDEX: 25.11 KG/M2 | WEIGHT: 160 LBS | HEIGHT: 67 IN

## 2023-02-27 PROCEDURE — 99213 OFFICE O/P EST LOW 20 MIN: CPT | Mod: ACP,95

## 2023-02-27 NOTE — HISTORY OF PRESENT ILLNESS
[Right Arm] : right arm [Work related] : work related [Gradual] : gradual [5] : 5 [Dull/Aching] : dull/aching [Radiating] : radiating [Throbbing] : throbbing [Intermittent] : intermittent [Household chores] : household chores [Work] : work [Sleep] : sleep [Rest] : rest [Meds] : meds [Heat] : heat [Retired] : Work status: retired [Steroid] : Steroid [FreeTextEntry1] : Chronic right upper extremity CRPS pain managed with current pain medication regimen. He maintains  a functional ADL. Weather  changes affect his pain level.  [] : Post Surgical Visit: no [FreeTextEntry3] : 03/09/2005 [de-identified] : WEATHER  [de-identified] : NEWS DAY [de-identified] : CERVICAL  [de-identified] : EPIDURAL [TWNoteComboBox1] : 60%

## 2023-02-27 NOTE — REASON FOR VISIT
[Follow-Up Visit] : a follow-up pain management visit [Home] : at home, [unfilled] , at the time of the visit. [Medical Office: ([facility])___] : at the medical office located in  [Patient] : the patient [Self] : self [FreeTextEntry2] : Back pain

## 2023-02-27 NOTE — DISCUSSION/SUMMARY
[Medication Risks Reviewed] : Medication risks reviewed [de-identified] : Prescriptions renewed. Opioid [place]obtained [place] chart [place] and [place]. SOAPP-R completed on chart. The patient's medications are documented to the best of their ability. Quality of life and functional ability improved on medications. The patient is showing no aberrant behavior or evidence of diversion. The patient was advised not to use narcotic medication while operating an automobile or heavy machinery due to potential sedation or dizziness. The patient was educated to the risks associated with potential opioid dependence and addiction. Urine toxicology screens as per office protocol. Use of multimodal analgesia used prn.\par Follow up one month.

## 2023-03-15 ENCOUNTER — APPOINTMENT (OUTPATIENT)
Age: 68
End: 2023-03-15

## 2023-03-24 ENCOUNTER — APPOINTMENT (OUTPATIENT)
Dept: PAIN MANAGEMENT | Facility: CLINIC | Age: 68
End: 2023-03-24
Payer: OTHER MISCELLANEOUS

## 2023-03-24 PROCEDURE — 99213 OFFICE O/P EST LOW 20 MIN: CPT

## 2023-03-24 RX ORDER — OXYCODONE HYDROCHLORIDE 15 MG/1
15 TABLET ORAL
Qty: 120 | Refills: 0 | Status: DISCONTINUED | COMMUNITY
Start: 2020-03-05 | End: 2023-03-24

## 2023-03-24 NOTE — HISTORY OF PRESENT ILLNESS
[Right Arm] : right arm [Work related] : work related [Gradual] : gradual [5] : 5 [Dull/Aching] : dull/aching [Radiating] : radiating [Throbbing] : throbbing [Intermittent] : intermittent [Household chores] : household chores [Work] : work [Sleep] : sleep [Rest] : rest [Meds] : meds [Heat] : heat [Retired] : Work status: retired [Steroid] : Steroid [Lower back] : lower back [de-identified] : PTA COMPLETED IN THE PAST 6MNTH 3X A WEEK WORSEN IMPROVMENT WITH PAIN\par HOME STRETCHING 5X AWEEK WITH MILD IMPROVMENT  [] : Post Surgical Visit: no [FreeTextEntry3] : 03/09/2005 [de-identified] : WEATHER  [de-identified] : NEWS DAY [de-identified] : CERVICAL  [de-identified] : EPIDURAL [TWNoteComboBox1] : 60%

## 2023-03-24 NOTE — REASON FOR VISIT
[Home] : at home, [unfilled] , at the time of the visit. [Medical Office: ([facility])___] : at the medical office located in  [Patient] : the patient [Self] : self [Follow-Up Visit] : a follow-up pain management visit [FreeTextEntry2] : Back pain

## 2023-04-12 ENCOUNTER — APPOINTMENT (OUTPATIENT)
Age: 68
End: 2023-04-12

## 2023-04-21 ENCOUNTER — APPOINTMENT (OUTPATIENT)
Dept: PAIN MANAGEMENT | Facility: CLINIC | Age: 68
End: 2023-04-21
Payer: OTHER MISCELLANEOUS

## 2023-04-21 PROCEDURE — 99213 OFFICE O/P EST LOW 20 MIN: CPT | Mod: 95

## 2023-04-21 RX ORDER — OXYCODONE HYDROCHLORIDE 15 MG/1
15 TABLET ORAL
Qty: 120 | Refills: 0 | Status: DISCONTINUED | COMMUNITY
Start: 2023-02-27 | End: 2023-04-21

## 2023-04-21 NOTE — REASON FOR VISIT
[FreeTextEntry2] : PT IS BEING SEEN FOR A FOLLOW-UP IN OFFICE  PAIN MANAGEMENT VISIT FOR  MED REFILL

## 2023-04-21 NOTE — HISTORY OF PRESENT ILLNESS
[Lower back] : lower back [Right Arm] : right arm [Work related] : work related [Gradual] : gradual [5] : 5 [Dull/Aching] : dull/aching [Radiating] : radiating [Throbbing] : throbbing [Intermittent] : intermittent [Household chores] : household chores [Work] : work [Sleep] : sleep [Rest] : rest [Meds] : meds [Heat] : heat [Retired] : Work status: retired [Steroid] : Steroid [Home] : at home, [unfilled] , at the time of the visit. [Medical Office: ([facility])___] : at the medical office located in  [Verbal consent obtained from patient] : the patient, [unfilled] [de-identified] : PTA COMPLETED IN THE PAST 6MNTH 3X A WEEK WORSEN IMPROVMENT WITH PAIN\par HOME STRETCHING 5X AWEEK WITH MILD IMPROVMENT  [] : Post Surgical Visit: no [FreeTextEntry3] : 03/09/2005 [de-identified] : WEATHER  [de-identified] : NEWS DAY [de-identified] : CERVICAL  [de-identified] : EPIDURAL [TWNoteComboBox1] : 60%

## 2023-05-07 ENCOUNTER — OUTPATIENT (OUTPATIENT)
Dept: OUTPATIENT SERVICES | Facility: HOSPITAL | Age: 68
LOS: 1 days | Discharge: HOME | End: 2023-05-07

## 2023-05-07 DIAGNOSIS — Z98.890 OTHER SPECIFIED POSTPROCEDURAL STATES: Chronic | ICD-10-CM

## 2023-05-07 DIAGNOSIS — Z96.7 PRESENCE OF OTHER BONE AND TENDON IMPLANTS: Chronic | ICD-10-CM

## 2023-05-07 DIAGNOSIS — C61 MALIGNANT NEOPLASM OF PROSTATE: ICD-10-CM

## 2023-05-17 ENCOUNTER — APPOINTMENT (OUTPATIENT)
Age: 68
End: 2023-05-17

## 2023-05-19 ENCOUNTER — APPOINTMENT (OUTPATIENT)
Dept: PAIN MANAGEMENT | Facility: CLINIC | Age: 68
End: 2023-05-19
Payer: OTHER MISCELLANEOUS

## 2023-05-19 PROCEDURE — 99213 OFFICE O/P EST LOW 20 MIN: CPT | Mod: 95

## 2023-05-19 NOTE — HISTORY OF PRESENT ILLNESS
[Lower back] : lower back [Right Arm] : right arm [Work related] : work related [Gradual] : gradual [5] : 5 [Dull/Aching] : dull/aching [Radiating] : radiating [Throbbing] : throbbing [Intermittent] : intermittent [Household chores] : household chores [Work] : work [Sleep] : sleep [Rest] : rest [Meds] : meds [Heat] : heat [Retired] : Work status: retired [Steroid] : Steroid [Home] : at home, [unfilled] , at the time of the visit. [Medical Office: ([facility])___] : at the medical office located in  [Verbal consent obtained from patient] : the patient, [unfilled] [] : Post Surgical Visit: no [FreeTextEntry3] : 03/09/2005 [de-identified] : WEATHER  [de-identified] : NEWS DAY [de-identified] : EPIDURAL [de-identified] : CERVICAL  [TWNoteComboBox1] : 60% [de-identified] : PTA COMPLETED IN THE PAST 6MNTH 3X A WEEK WORSEN IMPROVMENT WITH PAIN\par HOME STRETCHING 5X AWEEK WITH MILD IMPROVMENT

## 2023-06-16 ENCOUNTER — APPOINTMENT (OUTPATIENT)
Dept: PAIN MANAGEMENT | Facility: CLINIC | Age: 68
End: 2023-06-16
Payer: OTHER MISCELLANEOUS

## 2023-06-16 PROCEDURE — 99213 OFFICE O/P EST LOW 20 MIN: CPT | Mod: 95

## 2023-06-16 NOTE — HISTORY OF PRESENT ILLNESS
[Right Arm] : right arm [Work related] : work related [Gradual] : gradual [5] : 5 [Dull/Aching] : dull/aching [Radiating] : radiating [Throbbing] : throbbing [Intermittent] : intermittent [Household chores] : household chores [Work] : work [Sleep] : sleep [Rest] : rest [Meds] : meds [Heat] : heat [Retired] : Work status: retired [Steroid] : Steroid [Home] : at home, [unfilled] , at the time of the visit. [Medical Office: ([facility])___] : at the medical office located in  [Verbal consent obtained from patient] : the patient, [unfilled] [] : Post Surgical Visit: no [FreeTextEntry3] : 03/09/2005 [de-identified] : NEWS DAY [de-identified] : WEATHER  [de-identified] : CERVICAL  [de-identified] : EPIDURAL [TWNoteComboBox1] : 60% [de-identified] : PTA COMPLETED IN THE PAST 6MNTH 3X A WEEK WORSEN IMPROVMENT WITH PAIN\par HOME STRETCHING 5X AWEEK WITH MILD IMPROVMENT

## 2023-08-01 ENCOUNTER — APPOINTMENT (OUTPATIENT)
Dept: PAIN MANAGEMENT | Facility: CLINIC | Age: 68
End: 2023-08-01
Payer: OTHER MISCELLANEOUS

## 2023-08-01 PROCEDURE — 99213 OFFICE O/P EST LOW 20 MIN: CPT | Mod: ACP

## 2023-08-01 NOTE — HISTORY OF PRESENT ILLNESS
[Right Arm] : right arm [Work related] : work related [Gradual] : gradual [4] : 4 [Dull/Aching] : dull/aching [Throbbing] : throbbing [Intermittent] : intermittent [Household chores] : household chores [Work] : work [Sleep] : sleep [Rest] : rest [Meds] : meds [Heat] : heat [Physical therapy] : physical therapy [Retired] : Work status: retired [Steroid] : Steroid [de-identified] : CRPS pain varies with weather changes. Pain meds effective, allow him a more functional ADL. Last UDT was 3- and consistent with prescribed medication.  [] : Post Surgical Visit: no [FreeTextEntry3] : 03/09/2006 [FreeTextEntry6] : NUMBNESS  [de-identified] : WEATHER  [de-identified] : HOME EXERCISES / STRETCHES 3-4X WEEK  [de-identified] : NEWS DAY [TWNoteComboBox1] : False

## 2023-08-01 NOTE — DISCUSSION/SUMMARY
[Medication Risks Reviewed] : Medication risks reviewed [de-identified] : Prescriptions renewed. Opioid [place]obtained on chart [place] and [place]. SOAPP-R completed on chart. The patient's medications are documented to the best of their ability. Quality of life and functional ability improved on medications. The patient is showing no aberrant behavior or evidence of diversion. The patient was advised not to use narcotic medication while operating an automobile or heavy machinery due to potential sedation or dizziness. The patient was educated to the risks associated with potential opioid dependence and addiction. Urine toxicology screens as per office protocol. Use of multimodal analgesia used prn. Follow up one month.

## 2023-08-05 ENCOUNTER — LABORATORY RESULT (OUTPATIENT)
Age: 68
End: 2023-08-05

## 2023-08-07 LAB
ALBUMIN SERPL ELPH-MCNC: 4.4 G/DL
ALP BLD-CCNC: 126 U/L
ALT SERPL-CCNC: 9 U/L
ANION GAP SERPL CALC-SCNC: 14 MMOL/L
AST SERPL-CCNC: 10 U/L
BILIRUB SERPL-MCNC: 0.6 MG/DL
BUN SERPL-MCNC: 11 MG/DL
CALCIUM SERPL-MCNC: 9.7 MG/DL
CHLORIDE SERPL-SCNC: 100 MMOL/L
CO2 SERPL-SCNC: 25 MMOL/L
CREAT SERPL-MCNC: 0.75 MG/DL
EGFR: 98 ML/MIN/1.73M2
GLUCOSE SERPL-MCNC: 110 MG/DL
POTASSIUM SERPL-SCNC: 4.2 MMOL/L
PROT SERPL-MCNC: 6.8 G/DL
PSA SERPL-MCNC: <0.01 NG/ML
SODIUM SERPL-SCNC: 139 MMOL/L

## 2023-08-08 ENCOUNTER — OUTPATIENT (OUTPATIENT)
Dept: OUTPATIENT SERVICES | Facility: HOSPITAL | Age: 68
LOS: 1 days | Discharge: HOME | End: 2023-08-08

## 2023-08-08 DIAGNOSIS — R97.20 ELEVATED PROSTATE SPECIFIC ANTIGEN [PSA]: ICD-10-CM

## 2023-08-08 DIAGNOSIS — Z96.7 PRESENCE OF OTHER BONE AND TENDON IMPLANTS: Chronic | ICD-10-CM

## 2023-08-08 DIAGNOSIS — Z98.890 OTHER SPECIFIED POSTPROCEDURAL STATES: Chronic | ICD-10-CM

## 2023-08-08 DIAGNOSIS — C61 MALIGNANT NEOPLASM OF PROSTATE: ICD-10-CM

## 2023-08-16 ENCOUNTER — APPOINTMENT (OUTPATIENT)
Age: 68
End: 2023-08-16

## 2023-08-16 ENCOUNTER — APPOINTMENT (OUTPATIENT)
Dept: HEMATOLOGY ONCOLOGY | Facility: CLINIC | Age: 68
End: 2023-08-16
Payer: MEDICARE

## 2023-08-16 VITALS
DIASTOLIC BLOOD PRESSURE: 93 MMHG | TEMPERATURE: 97.3 F | OXYGEN SATURATION: 97 % | HEART RATE: 69 BPM | WEIGHT: 158.05 LBS | BODY MASS INDEX: 24.81 KG/M2 | SYSTOLIC BLOOD PRESSURE: 158 MMHG | HEIGHT: 67 IN

## 2023-08-16 PROCEDURE — 99213 OFFICE O/P EST LOW 20 MIN: CPT

## 2023-08-16 NOTE — REVIEW OF SYSTEMS
[Negative] : Allergic/Immunologic [FreeTextEntry9] : back pain - degenerative disc disease confirmed on MRI

## 2023-08-16 NOTE — ASSESSMENT
[FreeTextEntry1] : Prostate cancer, with PSA recurrence, now treating with intermittent androgen deprivation - restarted Lupron 2/20, through 10/20, now continuing at patient request.   PSA <0.01  - Patient resumed Lupron on 11/19/21,2/22,5/23/22, 8/1722, 2/15/23, 5/17/23, - continue q 3 month, with next due today -patient prefers continuous ADT. - Patient defers clinical coordination referral for PM&R for degenerative disc disease

## 2023-08-16 NOTE — ADDENDUM
[FreeTextEntry1] : Documented by Corina Goddard  acting as scribe for Dr. Landers on  08/16/2023.  All Medical record entries made by the Scribe were at my, Dr. Landers's, direction and personally dictated by me on  08/16/2023. I have reviewed the chart and agree that the record accurately reflects my personal performance of the history, physical exam, assessment and plan. I have also personally directed, reviewed, and agreed with the discharge instructions.

## 2023-09-06 ENCOUNTER — APPOINTMENT (OUTPATIENT)
Dept: PAIN MANAGEMENT | Facility: CLINIC | Age: 68
End: 2023-09-06
Payer: OTHER MISCELLANEOUS

## 2023-09-06 VITALS — WEIGHT: 158 LBS | HEIGHT: 67 IN | BODY MASS INDEX: 24.8 KG/M2

## 2023-09-06 PROCEDURE — 99213 OFFICE O/P EST LOW 20 MIN: CPT | Mod: ACP,95

## 2023-09-06 PROCEDURE — 99212 OFFICE O/P EST SF 10 MIN: CPT | Mod: ACP,95

## 2023-09-06 NOTE — REASON FOR VISIT
[Follow-Up Visit] : a follow-up pain management visit [Home] : at home, [unfilled] , at the time of the visit. [Medical Office: ([facility])___] : at the medical office located in  [Patient] : the patient [Self] : self [FreeTextEntry2] : MED REFILL

## 2023-09-06 NOTE — DISCUSSION/SUMMARY
[Medication Risks Reviewed] : Medication risks reviewed [de-identified] : Prescriptions renewed. Opioid [place]obtained on chart [place] and [place]. SOAPP-R completed on chart. The patient's medications are documented to the best of their ability. Quality of life and functional ability improved on medications. The patient is showing no aberrant behavior or evidence of diversion. The patient was advised not to use narcotic medication while operating an automobile or heavy machinery due to potential sedation or dizziness. The patient was educated to the risks associated with potential opioid dependence and addiction. Urine toxicology screens as per office protocol. Use of multimodal analgesia used prn. Follow up one month.

## 2023-09-06 NOTE — HISTORY OF PRESENT ILLNESS
[Right Arm] : right arm [Work related] : work related [Gradual] : gradual [6] : 6 [4] : 4 [Burning] : burning [Constant] : constant [Household chores] : household chores [Work] : work [Sleep] : sleep [Rest] : rest [Meds] : meds [Heat] : heat [Physical therapy] : physical therapy [Retired] : Work status: retired [Steroid] : Steroid [FreeTextEntry1] : Right. upper extremity CRPS pain varies with. weather changes. Meds effective prn. Last UDT was 8- and consistent with prescribed medication.  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

## 2023-10-04 ENCOUNTER — APPOINTMENT (OUTPATIENT)
Dept: PAIN MANAGEMENT | Facility: CLINIC | Age: 68
End: 2023-10-04
Payer: OTHER MISCELLANEOUS

## 2023-10-04 VITALS — WEIGHT: 158 LBS | BODY MASS INDEX: 24.8 KG/M2 | HEIGHT: 67 IN

## 2023-10-04 PROCEDURE — 99213 OFFICE O/P EST LOW 20 MIN: CPT | Mod: ACP,95

## 2023-11-01 ENCOUNTER — APPOINTMENT (OUTPATIENT)
Dept: PAIN MANAGEMENT | Facility: CLINIC | Age: 68
End: 2023-11-01
Payer: OTHER MISCELLANEOUS

## 2023-11-01 PROCEDURE — 99213 OFFICE O/P EST LOW 20 MIN: CPT | Mod: ACP,95

## 2023-11-02 ENCOUNTER — OUTPATIENT (OUTPATIENT)
Dept: OUTPATIENT SERVICES | Facility: HOSPITAL | Age: 68
LOS: 1 days | Discharge: HOME | End: 2023-11-02

## 2023-11-02 DIAGNOSIS — Z96.7 PRESENCE OF OTHER BONE AND TENDON IMPLANTS: Chronic | ICD-10-CM

## 2023-11-02 DIAGNOSIS — C61 MALIGNANT NEOPLASM OF PROSTATE: ICD-10-CM

## 2023-11-02 DIAGNOSIS — Z98.890 OTHER SPECIFIED POSTPROCEDURAL STATES: Chronic | ICD-10-CM

## 2023-11-08 ENCOUNTER — APPOINTMENT (OUTPATIENT)
Age: 68
End: 2023-11-08

## 2023-12-02 ENCOUNTER — APPOINTMENT (OUTPATIENT)
Dept: PAIN MANAGEMENT | Facility: CLINIC | Age: 68
End: 2023-12-02
Payer: OTHER MISCELLANEOUS

## 2023-12-02 VITALS — HEIGHT: 67 IN | WEIGHT: 158 LBS | BODY MASS INDEX: 24.8 KG/M2

## 2023-12-02 PROCEDURE — 99213 OFFICE O/P EST LOW 20 MIN: CPT

## 2023-12-29 ENCOUNTER — APPOINTMENT (OUTPATIENT)
Dept: PAIN MANAGEMENT | Facility: CLINIC | Age: 68
End: 2023-12-29
Payer: OTHER MISCELLANEOUS

## 2023-12-29 DIAGNOSIS — G90.513 COMPLEX REGIONAL PAIN SYNDROME I OF UPPER LIMB, BILATERAL: ICD-10-CM

## 2023-12-29 PROCEDURE — 99212 OFFICE O/P EST SF 10 MIN: CPT

## 2023-12-29 PROCEDURE — 99213 OFFICE O/P EST LOW 20 MIN: CPT | Mod: ACP,95

## 2023-12-29 NOTE — REASON FOR VISIT
[Follow-Up Visit] : a follow-up pain management visit [Home] : at home, [unfilled] , at the time of the visit. [Medical Office: ([facility])___] : at the medical office located in  [Patient] : the patient [Self] : self [FreeTextEntry2] : MED REFILL  FOLLOW UP

## 2023-12-29 NOTE — HISTORY OF PRESENT ILLNESS
[Right Arm] : right arm [Work related] : work related [Gradual] : gradual [5] : 5 [4] : 4 [Burning] : burning [Radiating] : radiating [Constant] : constant [Household chores] : household chores [Work] : work [Sleep] : sleep [Rest] : rest [Meds] : meds [Heat] : heat [Physical therapy] : physical therapy [Retired] : Work status: retired [Steroid] : Steroid [FreeTextEntry1] : Right arm CRPS pain stable. Meds effective prn.  Exercises as tolerated. Last UDT was 12- and consistent with prescribed medication. [] : Post Surgical Visit: no [FreeTextEntry3] : 03/09/2006 [FreeTextEntry6] : NUMBNESS IN RT HAND [FreeTextEntry7] : STIFFNESS IN FINGERS RT HAND  [de-identified] : WEATHER  [de-identified] : AS OF 2023-12-29 HOME EXERCISES / STRETCHES 3-4X WEEK  [de-identified] : NEWS DAY

## 2023-12-29 NOTE — DISCUSSION/SUMMARY
[Medication Risks Reviewed] : Medication risks reviewed [de-identified] : Prescriptions renewed. Opioid [place]obtained on chart [place] and [place]. SOAPP-R completed on chart. The patient's medications are documented to the best of their ability. Quality of life and functional ability improved on medications. The patient is showing no aberrant behavior or evidence of diversion. The patient was advised not to use narcotic medication while operating an automobile or heavy machinery due to potential sedation or dizziness. The patient was educated to the risks associated with potential opioid dependence and addiction. Urine toxicology screens as per office protocol. Use of multimodal analgesia used prn. Follow up one month.

## 2024-01-31 ENCOUNTER — APPOINTMENT (OUTPATIENT)
Dept: PAIN MANAGEMENT | Facility: CLINIC | Age: 69
End: 2024-01-31
Payer: OTHER MISCELLANEOUS

## 2024-01-31 PROCEDURE — 99213 OFFICE O/P EST LOW 20 MIN: CPT | Mod: ACP,95

## 2024-01-31 NOTE — DISCUSSION/SUMMARY
[de-identified] : Prescriptions renewed. Opioid agreement/obtained on chart [place]  reviewed and appropriate. SOAPP-R completed on chart. The patient's medications are documented to the best of their ability. Quality of life and functional ability improved on medications. The patient is showing no aberrant behavior or evidence of diversion. The patient was advised not to use narcotic medication while operating an automobile or heavy machinery due to potential sedation or dizziness. The patient was educated to the risks associated with potential opioid dependence and addiction. Urine toxicology screens as per office protocol. Use of multimodal analgesia used prn. Follow up one month. [Medication Risks Reviewed] : Medication risks reviewed

## 2024-01-31 NOTE — HISTORY OF PRESENT ILLNESS
[Right Arm] : right arm [Work related] : work related [Gradual] : gradual [5] : 5 [4] : 4 [Burning] : burning [Radiating] : radiating [Constant] : constant [Household chores] : household chores [Work] : work [Sleep] : sleep [Rest] : rest [Meds] : meds [Heat] : heat [Physical therapy] : physical therapy [Retired] : Work status: retired [Steroid] : Steroid [FreeTextEntry1] : CRPS pain to right upper extremity has increased. States he. awakens at night as his. right hand is stiff. Meds effective. Weather changes and stress affect his condition. Last UDT was 12-  and consistent with prescribed medication.  Maintains a functional ADL. [] : Post Surgical Visit: no [FreeTextEntry3] : 03/09/2006 [FreeTextEntry6] : NUMBNESS IN RT HAND [FreeTextEntry7] : STIFFNESS IN FINGERS RT HAND  [de-identified] : AS OF 2024-01-31 HOME EXERCISES / STRETCHES 3-4X WEEK  [de-identified] : WEATHER  [de-identified] : NEWS DAY

## 2024-01-31 NOTE — REASON FOR VISIT
[Follow-Up Visit] : a follow-up pain management visit [Home] : at home, [unfilled] , at the time of the visit. [Medical Office: ([facility])___] : at the medical office located in  [Patient] : the patient [Self] : self [FreeTextEntry2] : MED REFILL

## 2024-02-07 ENCOUNTER — OUTPATIENT (OUTPATIENT)
Dept: OUTPATIENT SERVICES | Facility: HOSPITAL | Age: 69
LOS: 1 days | Discharge: HOME | End: 2024-02-07

## 2024-02-07 DIAGNOSIS — C61 MALIGNANT NEOPLASM OF PROSTATE: ICD-10-CM

## 2024-02-07 DIAGNOSIS — Z98.890 OTHER SPECIFIED POSTPROCEDURAL STATES: Chronic | ICD-10-CM

## 2024-02-07 DIAGNOSIS — Z96.7 PRESENCE OF OTHER BONE AND TENDON IMPLANTS: Chronic | ICD-10-CM

## 2024-02-14 ENCOUNTER — RESULT REVIEW (OUTPATIENT)
Age: 69
End: 2024-02-14

## 2024-02-14 ENCOUNTER — APPOINTMENT (OUTPATIENT)
Age: 69
End: 2024-02-14

## 2024-02-14 ENCOUNTER — APPOINTMENT (OUTPATIENT)
Dept: HEMATOLOGY ONCOLOGY | Facility: CLINIC | Age: 69
End: 2024-02-14
Payer: MEDICARE

## 2024-02-14 VITALS — DIASTOLIC BLOOD PRESSURE: 92 MMHG | HEART RATE: 98 BPM | OXYGEN SATURATION: 96 % | SYSTOLIC BLOOD PRESSURE: 150 MMHG

## 2024-02-14 LAB
BASOPHILS # BLD AUTO: 0.1 K/UL — SIGNIFICANT CHANGE UP (ref 0–0.2)
BASOPHILS NFR BLD AUTO: 0.7 % — SIGNIFICANT CHANGE UP (ref 0–2)
EOSINOPHIL # BLD AUTO: 0.5 K/UL — SIGNIFICANT CHANGE UP (ref 0–0.5)
EOSINOPHIL NFR BLD AUTO: 5.5 % — SIGNIFICANT CHANGE UP (ref 0–6)
HCT VFR BLD CALC: 40.4 % — SIGNIFICANT CHANGE UP (ref 39–50)
HGB BLD-MCNC: 14.4 G/DL — SIGNIFICANT CHANGE UP (ref 13–17)
LYMPHOCYTES # BLD AUTO: 1.2 K/UL — SIGNIFICANT CHANGE UP (ref 1–3.3)
LYMPHOCYTES # BLD AUTO: 13.4 % — SIGNIFICANT CHANGE UP (ref 13–44)
MCHC RBC-ENTMCNC: 30.4 PG — SIGNIFICANT CHANGE UP (ref 27–34)
MCHC RBC-ENTMCNC: 35.7 G/DL — SIGNIFICANT CHANGE UP (ref 32–36)
MCV RBC AUTO: 85.3 FL — SIGNIFICANT CHANGE UP (ref 80–100)
MONOCYTES # BLD AUTO: 0.6 K/UL — SIGNIFICANT CHANGE UP (ref 0–0.9)
MONOCYTES NFR BLD AUTO: 6.8 % — SIGNIFICANT CHANGE UP (ref 2–14)
NEUTROPHILS # BLD AUTO: 6.6 K/UL — SIGNIFICANT CHANGE UP (ref 1.8–7.4)
NEUTROPHILS NFR BLD AUTO: 73.6 % — SIGNIFICANT CHANGE UP (ref 43–77)
PLATELET # BLD AUTO: 406 K/UL — HIGH (ref 150–400)
RBC # BLD: 4.74 M/UL — SIGNIFICANT CHANGE UP (ref 4.2–5.8)
RBC # FLD: 10.9 % — SIGNIFICANT CHANGE UP (ref 10.3–14.5)
WBC # BLD: 8.9 K/UL — SIGNIFICANT CHANGE UP (ref 3.8–10.5)
WBC # FLD AUTO: 8.9 K/UL — SIGNIFICANT CHANGE UP (ref 3.8–10.5)

## 2024-02-14 PROCEDURE — 99214 OFFICE O/P EST MOD 30 MIN: CPT

## 2024-02-14 NOTE — ADDENDUM
[FreeTextEntry1] : Documented by Otf Chopra acting as scribe for Dr. Landers on  02/14/2024.   All Medical record entries made by the Scribe were at my, Dr. Landers's, direction and personally dictated by me on  02/14/2024. I have reviewed the chart and agree that the record accurately reflects my personal performance of the history, physical exam, assessment and plan. I have also personally directed, reviewed, and agreed with the discharge instructions.

## 2024-02-14 NOTE — HISTORY OF PRESENT ILLNESS
[de-identified] : Mr. Singleton is a 68 year old male following up for prostate cancer. He saw Dr. García for PSA elevation from 5 in 2017 to 9 in March 2018. 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 He is s/p robotic-assisted radical prostatectomy with Dr. Mckenna at [facility] on 3/14/18, with pathology identical to original: [place] 9 prostate adenCa with extensive perineural invasion, sravani-prostatic spread in soft tissue surrounding seminal vesicles, bilateral extensive seminal vesicle involvement and tumor within a few cell layers of bladder base. Soft tissue margins of both seminal vesicles were positive, 4 pelvic nodes were negative. Post-op PSA on 4/10/18 was 1.57. Started on Lupron in April 2018 and treated with external beam radiation in May 2018. Due to intolerable side effects (fatigue, muscle weakness, sexual dysfunction) Jaden decided to hold Lupron in December 2018, at which point his PSA was <0.01. He is now treated with intermittent androgen deprivation, after PSA anup to 2.0 . Last Lupron injection was 7.5 mg on October 9 2020. . PSA had been less than 0.01, and were able to hold Lupron until the PSA anup to greater than 1.    Patient resumed Lupron on 11/19/21 due to rise of PSA to 1.12     . By February 18, 2022 PSA was back down to less than 0.01. A 3-month dose of Lupron was restarted on 8/17/22 The PSA remains less than 0.01.  [de-identified] : Returns for follow up visit. S/p Lupron on 11/8/23, next injection due today, 2/14/24. S/p Mohs surgery performed by Dr. Kemp, dermatology, in [place] on extensor surface right foot for basal cell carcinoma. Biopsy was taken at the time of surgery. Cancer is unrelated to prostate. Followed up with [facility]. Able to ambulate without difficulty. Feels well overall with respect to Lupron injections.   PSA <0.01 on 8/5/23 and 2/15/23

## 2024-02-14 NOTE — ASSESSMENT
[FreeTextEntry1] : Prostate cancer, with PSA recurrence, now treating with intermittent androgen deprivation - restarted Lupron 2/20, through 10/20, now continuing at patient request.  PSA <0.01 on 8/5/23 and 2/15/23  - Patient resumed Lupron on 11/19/21,2/22,5/23/22, 8/1722, 2/15/23, 5/17/23, - continue q 3 month, with next due today 2/14/24 -patient prefers continuous ADT. -Next lupron due 5/15/24. - Patient defers clinical coordination referral for PM&R for degenerative disc disease  -S/p Mohs surgery performed by Dr. Kemp, dermatology, in [place] extensor surface right foot for basal cell carcinoma - will request records. -RTO in 3 months.

## 2024-02-28 ENCOUNTER — APPOINTMENT (OUTPATIENT)
Dept: PAIN MANAGEMENT | Facility: CLINIC | Age: 69
End: 2024-02-28
Payer: OTHER MISCELLANEOUS

## 2024-02-28 PROCEDURE — 99213 OFFICE O/P EST LOW 20 MIN: CPT | Mod: ACP

## 2024-02-28 RX ORDER — NALOXONE HYDROCHLORIDE 4 MG/.1ML
4 SPRAY NASAL
Qty: 1 | Refills: 0 | Status: ACTIVE | COMMUNITY
Start: 2023-09-06 | End: 1900-01-01

## 2024-02-28 NOTE — HISTORY OF PRESENT ILLNESS
[Right Arm] : right arm [Work related] : work related [Gradual] : gradual [8] : 8 [4] : 4 [Burning] : burning [Dull/Aching] : dull/aching [Sharp] : sharp [Radiating] : radiating [Constant] : constant [Household chores] : household chores [Work] : work [Sleep] : sleep [Rest] : rest [Meds] : meds [Heat] : heat [Physical therapy] : physical therapy [Retired] : Work status: retired [Steroid] : Steroid [FreeTextEntry1] : Back pain stable. Meds effective..  Maintains a functional ADL. Last UDT was 11- and consistent with prescribed medication. [] : Post Surgical Visit: no [FreeTextEntry3] : 03/09/2006 [FreeTextEntry6] : NUMBNESS IN RT HAND [FreeTextEntry7] : STIFFNESS IN FINGERS RT HAND  [de-identified] : WEATHER  [de-identified] : AS OF 2024-02-28 HOME EXERCISES / STRETCHES 3-4X WEEK  [de-identified] : NEWS DAY

## 2024-02-28 NOTE — DISCUSSION/SUMMARY
[Medication Risks Reviewed] : Medication risks reviewed [de-identified] : Prescriptions renewed. Opioid [place]obtained on chart [place] and [place]. SOAPP-R completed on chart. The patient's medications are documented to the best of their ability. Quality of life and functional ability improved on medications. The patient is showing no aberrant behavior or evidence of diversion. The patient was advised not to use narcotic medication while operating an automobile or heavy machinery due to potential sedation or dizziness. The patient was educated to the risks associated with potential opioid dependence and addiction. Urine toxicology screens as per office protocol. Use of multimodal analgesia used prn. Follow up one month.

## 2024-02-28 NOTE — REASON FOR VISIT
[Follow-Up Visit] : a follow-up pain management visit [Home] : at home, [unfilled] , at the time of the visit. [Medical Office: ([facility])___] : at the medical office located in  [Patient] : the patient [Self] : self [FreeTextEntry2] : MED REFILL  FOLLOW UP

## 2024-03-25 ENCOUNTER — APPOINTMENT (OUTPATIENT)
Dept: PAIN MANAGEMENT | Facility: CLINIC | Age: 69
End: 2024-03-25
Payer: OTHER MISCELLANEOUS

## 2024-03-25 PROCEDURE — 99213 OFFICE O/P EST LOW 20 MIN: CPT | Mod: ACP

## 2024-03-25 NOTE — DISCUSSION/SUMMARY
[de-identified] : Prescriptions renewed. Opioid agreement/obtained on chart [place]  reviewed and appropriate. SOAPP-R completed on chart. The patient's medications are documented to the best of their ability. Quality of life and functional ability improved on medications. The patient is showing no aberrant behavior or evidence of diversion. The patient was advised not to use narcotic medication while operating an automobile or heavy machinery due to potential sedation or dizziness. The patient was educated to the risks associated with potential opioid dependence and addiction. Urine toxicology screens as per office protocol. Use of multimodal analgesia used prn. Follow up one month. [Medication Risks Reviewed] : Medication risks reviewed

## 2024-03-25 NOTE — REASON FOR VISIT
[Follow-Up Visit] : a follow-up pain management visit [Home] : at home, [unfilled] , at the time of the visit. [Medical Office: ([facility])___] : at the medical office located in  [Self] : self [Patient] : the patient [FreeTextEntry2] : MED REFILL  FOLLOW UP

## 2024-03-25 NOTE — HISTORY OF PRESENT ILLNESS
[Right Arm] : right arm [Work related] : work related [Gradual] : gradual [8] : 8 [4] : 4 [Burning] : burning [Dull/Aching] : dull/aching [Radiating] : radiating [Sharp] : sharp [Constant] : constant [Household chores] : household chores [Work] : work [Sleep] : sleep [Rest] : rest [Meds] : meds [Heat] : heat [Physical therapy] : physical therapy [Retired] : Work status: retired [Steroid] : Steroid [FreeTextEntry1] :  States his right upper extremity CRPS pain varies. Notes rain and weather changes affect. him.  States he awakens at night. with cramping to his right hand and resolves as her exercises his hand.  Pain meds effective prn.  Maintains a functional ADL.Last UDT was 12- and consistent with prescribed medication. [] : Post Surgical Visit: no [FreeTextEntry6] : NUMBNESS IN RT HAND [FreeTextEntry3] : 03/09/2006 [FreeTextEntry7] : STIFFNESS IN FINGERS RT HAND  [de-identified] : WEATHER  [de-identified] : AS OF 2024-03-25 HOME EXERCISES / STRETCHES 3-4X WEEK  , GYM [de-identified] : NEWS DAY

## 2024-04-22 ENCOUNTER — APPOINTMENT (OUTPATIENT)
Dept: PAIN MANAGEMENT | Facility: CLINIC | Age: 69
End: 2024-04-22
Payer: OTHER MISCELLANEOUS

## 2024-04-22 VITALS — BODY MASS INDEX: 25.11 KG/M2 | WEIGHT: 160 LBS | HEIGHT: 67 IN

## 2024-04-22 DIAGNOSIS — M54.16 RADICULOPATHY, LUMBAR REGION: ICD-10-CM

## 2024-04-22 PROCEDURE — 99213 OFFICE O/P EST LOW 20 MIN: CPT

## 2024-05-08 ENCOUNTER — OUTPATIENT (OUTPATIENT)
Dept: OUTPATIENT SERVICES | Facility: HOSPITAL | Age: 69
LOS: 1 days | Discharge: HOME | End: 2024-05-08

## 2024-05-08 DIAGNOSIS — Z98.890 OTHER SPECIFIED POSTPROCEDURAL STATES: Chronic | ICD-10-CM

## 2024-05-08 DIAGNOSIS — C61 MALIGNANT NEOPLASM OF PROSTATE: ICD-10-CM

## 2024-05-08 DIAGNOSIS — Z96.7 PRESENCE OF OTHER BONE AND TENDON IMPLANTS: Chronic | ICD-10-CM

## 2024-05-15 ENCOUNTER — APPOINTMENT (OUTPATIENT)
Age: 69
End: 2024-05-15

## 2024-05-15 ENCOUNTER — LABORATORY RESULT (OUTPATIENT)
Age: 69
End: 2024-05-15

## 2024-05-15 ENCOUNTER — RESULT REVIEW (OUTPATIENT)
Age: 69
End: 2024-05-15

## 2024-05-15 ENCOUNTER — APPOINTMENT (OUTPATIENT)
Dept: HEMATOLOGY ONCOLOGY | Facility: CLINIC | Age: 69
End: 2024-05-15
Payer: MEDICARE

## 2024-05-15 VITALS
HEIGHT: 67 IN | BODY MASS INDEX: 24.89 KG/M2 | DIASTOLIC BLOOD PRESSURE: 83 MMHG | OXYGEN SATURATION: 95 % | TEMPERATURE: 97.3 F | HEART RATE: 71 BPM | SYSTOLIC BLOOD PRESSURE: 123 MMHG | WEIGHT: 158.56 LBS

## 2024-05-15 LAB
ALBUMIN SERPL ELPH-MCNC: 4.6 G/DL
ALP BLD-CCNC: 135 U/L
ALT SERPL-CCNC: 20 U/L
ANION GAP SERPL CALC-SCNC: 13 MMOL/L
AST SERPL-CCNC: 18 U/L
BASOPHILS # BLD AUTO: 0.1 K/UL — SIGNIFICANT CHANGE UP (ref 0–0.2)
BASOPHILS NFR BLD AUTO: 0.7 % — SIGNIFICANT CHANGE UP (ref 0–2)
BILIRUB SERPL-MCNC: 0.5 MG/DL
BUN SERPL-MCNC: 20 MG/DL
CALCIUM SERPL-MCNC: 9.3 MG/DL
CHLORIDE SERPL-SCNC: 103 MMOL/L
CO2 SERPL-SCNC: 24 MMOL/L
CREAT SERPL-MCNC: 0.84 MG/DL
EGFR: 94 ML/MIN/1.73M2
EOSINOPHIL # BLD AUTO: 0.2 K/UL — SIGNIFICANT CHANGE UP (ref 0–0.5)
EOSINOPHIL NFR BLD AUTO: 2.8 % — SIGNIFICANT CHANGE UP (ref 0–6)
GLUCOSE SERPL-MCNC: 123 MG/DL
HCT VFR BLD CALC: 39.7 % — SIGNIFICANT CHANGE UP (ref 39–50)
HGB BLD-MCNC: 14.1 G/DL — SIGNIFICANT CHANGE UP (ref 13–17)
LYMPHOCYTES # BLD AUTO: 1.7 K/UL — SIGNIFICANT CHANGE UP (ref 1–3.3)
LYMPHOCYTES # BLD AUTO: 18.7 % — SIGNIFICANT CHANGE UP (ref 13–44)
MCHC RBC-ENTMCNC: 31.3 PG — SIGNIFICANT CHANGE UP (ref 27–34)
MCHC RBC-ENTMCNC: 36.2 G/DL — HIGH (ref 32–36)
MCV RBC AUTO: 87.2 FL — SIGNIFICANT CHANGE UP (ref 80–100)
MONOCYTES # BLD AUTO: 0.4 K/UL — SIGNIFICANT CHANGE UP (ref 0–0.9)
MONOCYTES NFR BLD AUTO: 5.1 % — SIGNIFICANT CHANGE UP (ref 2–14)
NEUTROPHILS # BLD AUTO: 6.4 K/UL — SIGNIFICANT CHANGE UP (ref 1.8–7.4)
NEUTROPHILS NFR BLD AUTO: 72.6 % — SIGNIFICANT CHANGE UP (ref 43–77)
PLATELET # BLD AUTO: 346 K/UL — SIGNIFICANT CHANGE UP (ref 150–400)
POTASSIUM SERPL-SCNC: 4.1 MMOL/L
PROT SERPL-MCNC: 7 G/DL
PSA SERPL-MCNC: 0.15 NG/ML
RBC # BLD: 4.55 M/UL — SIGNIFICANT CHANGE UP (ref 4.2–5.8)
RBC # FLD: 11.3 % — SIGNIFICANT CHANGE UP (ref 10.3–14.5)
SODIUM SERPL-SCNC: 140 MMOL/L
WBC # BLD: 8.8 K/UL — SIGNIFICANT CHANGE UP (ref 3.8–10.5)
WBC # FLD AUTO: 8.8 K/UL — SIGNIFICANT CHANGE UP (ref 3.8–10.5)

## 2024-05-15 PROCEDURE — 99214 OFFICE O/P EST MOD 30 MIN: CPT

## 2024-05-15 NOTE — ASSESSMENT
[FreeTextEntry1] : Prostate cancer, with PSA recurrence, now treating with intermittent androgen deprivation - restarted Lupron 2/20, through 10/20, now continuing at patient request.  PSA <0.01 on 2/15/23 and  8/5/23  1/15/24 PSA 0.06, no 0,15 (5/15/24)  - Patient resumed Lupron on 11/19/21,2/22,5/23/22, 8/1722, 2/15/23, 5/17/23, - continue q 3 month, with next due today 3/15/24 -patient prefers continuous ADT. -Next lupron due 8/14/24 - Patient defers clinical coordination referral for PM&R for degenerative disc disease  -S/p Mohs surgery performed by Dr. Kemp, dermatology, in [place] extensor surface right foot for basal cell carcinoma - will request records. -Increased urinary frequency and diarrhea - Will order PSMA PET and advised to f/u with GI -Repeat PSA levels rising, now to 0.15 - will plan addition of enzalutamide after review of PSMA PET -RTO after PET

## 2024-05-15 NOTE — ADDENDUM
[FreeTextEntry1] :  Documented by Joshua Caro acting as scribe for Dr. Landers on  05/15/2024.    All Medical record entries made by the Scribe were at my, Dr. Landers's, direction and personally dictated by me on  05/15/2024. I have reviewed the chart and agree that the record accurately reflects my personal performance of the history, physical exam, assessment and plan. I have also personally directed, reviewed, and agreed with the discharge instructions.

## 2024-05-15 NOTE — REVIEW OF SYSTEMS
[Fatigue] : fatigue [Negative] : Musculoskeletal [FreeTextEntry8] : frequent urination [FreeTextEntry9] : back pain - degenerative disc disease confirmed on MRI

## 2024-05-15 NOTE — HISTORY OF PRESENT ILLNESS
[de-identified] : Mr. Singleton is a 69 year old male following up for prostate cancer. He saw Dr. García for PSA elevation from 5 in 2017 to 9 in March 2018. 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 He is s/p robotic-assisted radical prostatectomy with Dr. Mckenna at [facility] on 3/14/18, with pathology identical to original: [place] 9 prostate adenCa with extensive perineural invasion, sravani-prostatic spread in soft tissue surrounding seminal vesicles, bilateral extensive seminal vesicle involvement and tumor within a few cell layers of bladder base. Soft tissue margins of both seminal vesicles were positive, 4 pelvic nodes were negative. Post-op PSA on 4/10/18 was 1.57. Started on Lupron in April 2018 and treated with external beam radiation in May 2018. Due to intolerable side effects (fatigue, muscle weakness, sexual dysfunction) Jaden decided to hold Lupron in December 2018, at which point his PSA was <0.01. He is now treated with intermittent androgen deprivation, after PSA anup to 2.0 . Last Lupron injection was 7.5 mg on October 9 2020. . PSA had been less than 0.01, and were able to hold Lupron until the PSA anup to greater than 1.    Patient resumed Lupron on 11/19/21 due to rise of PSA to 1.12     . By February 18, 2022 PSA was back down to less than 0.01. A 3-month dose of Lupron was restarted on 8/17/22 The PSA had remained less than 0.01 until 1/15/24 (0.06, and now today on 5/1/5/24 up again to .06.  [de-identified] : Returns for follow up visit. S/p Lupron on 2/14/24, next injection due today, 3/15/24. Last colonoscopy was 5 years ago Increased urinary frequency at nights States he has diarrhea right after he eats  Denies blood in stool Reports weight loss  Denies changes in appetite and bone pain

## 2024-05-20 ENCOUNTER — APPOINTMENT (OUTPATIENT)
Dept: NUCLEAR MEDICINE | Facility: CLINIC | Age: 69
End: 2024-05-20

## 2024-05-20 ENCOUNTER — OUTPATIENT (OUTPATIENT)
Dept: OUTPATIENT SERVICES | Facility: HOSPITAL | Age: 69
LOS: 1 days | End: 2024-05-20
Payer: MEDICARE

## 2024-05-20 DIAGNOSIS — Z98.890 OTHER SPECIFIED POSTPROCEDURAL STATES: Chronic | ICD-10-CM

## 2024-05-20 DIAGNOSIS — Z96.7 PRESENCE OF OTHER BONE AND TENDON IMPLANTS: Chronic | ICD-10-CM

## 2024-05-20 DIAGNOSIS — C61 MALIGNANT NEOPLASM OF PROSTATE: ICD-10-CM

## 2024-05-20 PROCEDURE — 78816 PET IMAGE W/CT FULL BODY: CPT | Mod: 26

## 2024-05-22 ENCOUNTER — APPOINTMENT (OUTPATIENT)
Dept: PAIN MANAGEMENT | Facility: CLINIC | Age: 69
End: 2024-05-22
Payer: OTHER MISCELLANEOUS

## 2024-05-22 PROCEDURE — 99213 OFFICE O/P EST LOW 20 MIN: CPT | Mod: ACP

## 2024-05-22 NOTE — DISCUSSION/SUMMARY
[Medication Risks Reviewed] : Medication risks reviewed [de-identified] : Prescriptions renewed. Opioid [place]obtained on chart [place] and [place]. SOAPP-R completed on chart. The patient's medications are documented to the best of their ability. Quality of life and functional ability improved on medications. The patient is showing no aberrant behavior or evidence of diversion. The patient was advised not to use narcotic medication while operating an automobile or heavy machinery due to potential sedation or dizziness. The patient was educated to the risks associated with potential opioid dependence and addiction. Urine toxicology screens as per office protocol. Use of multimodal analgesia used prn. Follow up one month.

## 2024-05-22 NOTE — REASON FOR VISIT
[Follow-Up Visit] : a follow-up pain management visit [Home] : at home, [unfilled] , at the time of the visit. [Medical Office: ([facility])___] : at the medical office located in  [Patient] : the patient [Self] : self

## 2024-05-22 NOTE — HISTORY OF PRESENT ILLNESS
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

## 2024-05-26 NOTE — HISTORY OF PRESENT ILLNESS
This note was copied from a baby's chart.  I met with Stone and Gertrudis today to discuss their infant feeding plan. They have been intermittently offering latch and breast feeding to babies A-Cristóbal and B-Chris, based on their cues. They then follow with a formula bottle feeding.  I encouraged using the breast pump to help stimulate milk production and Gertrudis was agreeable to this.She will notify her nurse when she is ready to get set up for that.   Feeding cues and skin to skin discussed with them and they were encouraged to request assistance when latching if needed.   [de-identified] : Mr. Singleton is a 68 year old male following up for prostate cancer. He saw Dr. García for PSA elevation from 5 in 2017 to 9 in March 2018. 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 He is s/p robotic-assisted radical prostatectomy with Dr. Mcknena at [facility] on 3/14/18, with pathology identical to original: [place] 9 prostate adenCa with extensive perineural invasion, sravani-prostatic spread in soft tissue surrounding seminal vesicles, bilateral extensive seminal vesicle involvement and tumor within a few cell layers of bladder base. Soft tissue margins of both seminal vesicles were positive, 4 pelvic nodes were negative. Post-op PSA on 4/10/18 was 1.57. Started on Lupron in April 2018 and treated with external beam radiation in May 2018. Due to intolerable side effects (fatigue, muscle weakness, sexual dysfunction) Jaden decided to hold Lupron in December 2018, at which point his PSA was <0.01. He is now treated with intermittent androgen deprivation, after PSA anup to 2.0 . Last Lupron injection was 7.5 mg on October 9 2020. . PSA had been less than 0.01, and were able to hold Lupron until the PSA anup to greater than 1.    Patient resumed Lupron on 11/19/21 due to rise of PSA to 1.12     . By February 18, 2022 PSA was back down to less than 0.01. A 3-month dose of Lupron was restarted on 8/17/22 The PSA remains less than 0.01.  [de-identified] : Returns for follow up visit. Feels well overall. Notes recently diagnosed with lumbosacral degenerative disc disease and c/o + back pain   PSA <0.01

## 2024-05-29 ENCOUNTER — APPOINTMENT (OUTPATIENT)
Dept: HEMATOLOGY ONCOLOGY | Facility: CLINIC | Age: 69
End: 2024-05-29
Payer: MEDICARE

## 2024-05-29 VITALS
OXYGEN SATURATION: 97 % | HEART RATE: 69 BPM | RESPIRATION RATE: 17 BRPM | BODY MASS INDEX: 24.62 KG/M2 | WEIGHT: 153.2 LBS | DIASTOLIC BLOOD PRESSURE: 97 MMHG | SYSTOLIC BLOOD PRESSURE: 151 MMHG | HEIGHT: 66 IN | TEMPERATURE: 97.3 F

## 2024-05-29 DIAGNOSIS — C61 MALIGNANT NEOPLASM OF PROSTATE: ICD-10-CM

## 2024-05-29 PROCEDURE — 99214 OFFICE O/P EST MOD 30 MIN: CPT

## 2024-05-30 PROBLEM — C61 PROSTATE CANCER: Status: ACTIVE | Noted: 2018-02-02

## 2024-05-31 RX ORDER — ENZALUTAMIDE 40 MG/1
40 CAPSULE ORAL DAILY
Qty: 120 | Refills: 6 | Status: ACTIVE | COMMUNITY
Start: 2024-05-29 | End: 1900-01-01

## 2024-06-13 ENCOUNTER — APPOINTMENT (OUTPATIENT)
Dept: GASTROENTEROLOGY | Facility: CLINIC | Age: 69
End: 2024-06-13

## 2024-06-26 ENCOUNTER — APPOINTMENT (OUTPATIENT)
Dept: PAIN MANAGEMENT | Facility: CLINIC | Age: 69
End: 2024-06-26
Payer: OTHER MISCELLANEOUS

## 2024-06-26 DIAGNOSIS — G89.4 CHRONIC PAIN SYNDROME: ICD-10-CM

## 2024-06-26 DIAGNOSIS — G90.511 COMPLEX REGIONAL PAIN SYNDROME I OF RIGHT UPPER LIMB: ICD-10-CM

## 2024-06-26 PROCEDURE — 99214 OFFICE O/P EST MOD 30 MIN: CPT | Mod: ACP

## 2024-06-26 RX ORDER — OXYCODONE HYDROCHLORIDE 15 MG/1
15 TABLET ORAL
Qty: 120 | Refills: 0 | Status: ACTIVE | COMMUNITY
Start: 2023-01-30 | End: 1900-01-01

## 2024-07-03 ENCOUNTER — RESULT REVIEW (OUTPATIENT)
Age: 69
End: 2024-07-03

## 2024-07-03 ENCOUNTER — APPOINTMENT (OUTPATIENT)
Dept: HEMATOLOGY ONCOLOGY | Facility: CLINIC | Age: 69
End: 2024-07-03
Payer: MEDICARE

## 2024-07-03 VITALS
HEART RATE: 50 BPM | OXYGEN SATURATION: 97 % | WEIGHT: 154.25 LBS | DIASTOLIC BLOOD PRESSURE: 91 MMHG | BODY MASS INDEX: 24.79 KG/M2 | SYSTOLIC BLOOD PRESSURE: 171 MMHG | HEIGHT: 66 IN

## 2024-07-03 DIAGNOSIS — C61 MALIGNANT NEOPLASM OF PROSTATE: ICD-10-CM

## 2024-07-03 LAB
ALBUMIN SERPL ELPH-MCNC: 4.5 G/DL
ALP BLD-CCNC: 114 U/L
ALT SERPL-CCNC: 11 U/L
ANION GAP SERPL CALC-SCNC: 15 MMOL/L
AST SERPL-CCNC: 14 U/L
BASOPHILS # BLD AUTO: 0.1 K/UL — SIGNIFICANT CHANGE UP (ref 0–0.2)
BASOPHILS NFR BLD AUTO: 0.9 % — SIGNIFICANT CHANGE UP (ref 0–2)
BILIRUB SERPL-MCNC: 0.6 MG/DL
BUN SERPL-MCNC: 18 MG/DL
CALCIUM SERPL-MCNC: 9.6 MG/DL
CHLORIDE SERPL-SCNC: 103 MMOL/L
CO2 SERPL-SCNC: 24 MMOL/L
CREAT SERPL-MCNC: 0.79 MG/DL
EGFR: 96 ML/MIN/1.73M2
EOSINOPHIL # BLD AUTO: 0.3 K/UL — SIGNIFICANT CHANGE UP (ref 0–0.5)
EOSINOPHIL NFR BLD AUTO: 4.3 % — SIGNIFICANT CHANGE UP (ref 0–6)
GLUCOSE SERPL-MCNC: 98 MG/DL
HCT VFR BLD CALC: 43.4 % — SIGNIFICANT CHANGE UP (ref 39–50)
HGB BLD-MCNC: 15 G/DL — SIGNIFICANT CHANGE UP (ref 13–17)
LYMPHOCYTES # BLD AUTO: 1.6 K/UL — SIGNIFICANT CHANGE UP (ref 1–3.3)
LYMPHOCYTES # BLD AUTO: 25 % — SIGNIFICANT CHANGE UP (ref 13–44)
MCHC RBC-ENTMCNC: 31.4 PG — SIGNIFICANT CHANGE UP (ref 27–34)
MCHC RBC-ENTMCNC: 34.5 G/DL — SIGNIFICANT CHANGE UP (ref 32–36)
MCV RBC AUTO: 91.2 FL — SIGNIFICANT CHANGE UP (ref 80–100)
MONOCYTES # BLD AUTO: 0.5 K/UL — SIGNIFICANT CHANGE UP (ref 0–0.9)
MONOCYTES NFR BLD AUTO: 7.7 % — SIGNIFICANT CHANGE UP (ref 2–14)
NEUTROPHILS # BLD AUTO: 4.1 K/UL — SIGNIFICANT CHANGE UP (ref 1.8–7.4)
NEUTROPHILS NFR BLD AUTO: 62.1 % — SIGNIFICANT CHANGE UP (ref 43–77)
PLATELET # BLD AUTO: 292 K/UL — SIGNIFICANT CHANGE UP (ref 150–400)
POTASSIUM SERPL-SCNC: 4.4 MMOL/L
PROT SERPL-MCNC: 6.8 G/DL
PSA SERPL-MCNC: <0.01 NG/ML
RBC # BLD: 4.76 M/UL — SIGNIFICANT CHANGE UP (ref 4.2–5.8)
RBC # FLD: 11.7 % — SIGNIFICANT CHANGE UP (ref 10.3–14.5)
SODIUM SERPL-SCNC: 142 MMOL/L
WBC # BLD: 6.6 K/UL — SIGNIFICANT CHANGE UP (ref 3.8–10.5)
WBC # FLD AUTO: 6.6 K/UL — SIGNIFICANT CHANGE UP (ref 3.8–10.5)

## 2024-07-03 PROCEDURE — 99214 OFFICE O/P EST MOD 30 MIN: CPT

## 2024-07-23 ENCOUNTER — APPOINTMENT (OUTPATIENT)
Dept: PAIN MANAGEMENT | Facility: CLINIC | Age: 69
End: 2024-07-23
Payer: OTHER MISCELLANEOUS

## 2024-07-23 ENCOUNTER — OUTPATIENT (OUTPATIENT)
Dept: OUTPATIENT SERVICES | Facility: HOSPITAL | Age: 69
LOS: 1 days | Discharge: HOME | End: 2024-07-23

## 2024-07-23 VITALS — HEIGHT: 67 IN | BODY MASS INDEX: 23.54 KG/M2 | WEIGHT: 150 LBS

## 2024-07-23 DIAGNOSIS — Z98.890 OTHER SPECIFIED POSTPROCEDURAL STATES: Chronic | ICD-10-CM

## 2024-07-23 DIAGNOSIS — G90.511 COMPLEX REGIONAL PAIN SYNDROME I OF RIGHT UPPER LIMB: ICD-10-CM

## 2024-07-23 DIAGNOSIS — C61 MALIGNANT NEOPLASM OF PROSTATE: ICD-10-CM

## 2024-07-23 DIAGNOSIS — Z96.7 PRESENCE OF OTHER BONE AND TENDON IMPLANTS: Chronic | ICD-10-CM

## 2024-07-23 PROCEDURE — 99213 OFFICE O/P EST LOW 20 MIN: CPT | Mod: ACP

## 2024-07-23 NOTE — HISTORY OF PRESENT ILLNESS
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

## 2024-07-23 NOTE — DISCUSSION/SUMMARY
[Medication Risks Reviewed] : Medication risks reviewed [de-identified] : Prescriptions renewed. Opioid [place]obtained on chart [place] and [place]. SOAPP-R completed on chart. The patient's medications are documented to the best of their ability. Quality of life and functional ability improved on medications. The patient is showing no aberrant behavior or evidence of diversion. The patient was advised not to use narcotic medication while operating an automobile or heavy machinery due to potential sedation or dizziness. The patient was educated to the risks associated with potential opioid dependence and addiction. Urine toxicology screens as per office protocol. Use of multimodal analgesia used prn. Follow up one month.

## 2024-07-24 ENCOUNTER — APPOINTMENT (OUTPATIENT)
Dept: HEMATOLOGY ONCOLOGY | Facility: CLINIC | Age: 69
End: 2024-07-24
Payer: MEDICARE

## 2024-07-24 VITALS
SYSTOLIC BLOOD PRESSURE: 162 MMHG | HEART RATE: 53 BPM | WEIGHT: 150 LBS | BODY MASS INDEX: 23.54 KG/M2 | DIASTOLIC BLOOD PRESSURE: 84 MMHG | OXYGEN SATURATION: 97 % | HEIGHT: 67 IN

## 2024-07-24 DIAGNOSIS — C61 MALIGNANT NEOPLASM OF PROSTATE: ICD-10-CM

## 2024-07-24 PROCEDURE — 99214 OFFICE O/P EST MOD 30 MIN: CPT

## 2024-07-24 NOTE — ASSESSMENT
[FreeTextEntry1] : Prostate cancer, with recurrence, now treating with intermittent androgen deprivation - restarted Lupron 2/20, through 10/20, now continuing at patient request.  PSA <0.01 on 2/15/23 and 8/5/23  1/15/24 PSA 0.06, now 0.15 (5/15/24) 7/3/24 PSA < 0.01.  - Patient resumed Lupron on 11/19/21,2/22,5/23/22, 8/1722, 2/15/23, 5/17/23, - continue q 3 month - Next Lupron due 8/14/24   - Increased urinary frequency and diarrhea - PSMA PET shows high PSMA expression in bone mets, low expression in adenopathy above and below diaphragm    IMPRESSION: 1.  No abnormal uptake at prostatectomy site. 2.  Lymph nodes with increased PSMA expression above and below the diaphragm, with quantitatively low to intermediate PSMA expression. 3.  Osseous metastasis with quantitatively high PSMA expression. 4.  No evidence of visceral metastases.  - Repeat PSA levels anup to 0.15 as of 5/15/24.  - Enzalutamide started in May 2024. PSA has returned to <0.01.  -Patient feels poorly on enzalutamide. He complains of insomnia, fatigue, and his hypertension exacerbation appears to be temporally related to initiation of enzalutamide. Today I asked Jaden to stop Xtandi.  Will obtain second opinion from Dr. Cornelio Cerrato regarding next steps in treatment. - RTO in 1 month

## 2024-07-24 NOTE — REVIEW OF SYSTEMS
[Fatigue] : fatigue [Negative] : Allergic/Immunologic [FreeTextEntry8] : frequent urination [FreeTextEntry9] : back pain - degenerative disc disease confirmed on MRI. left shoulder pain for 6 weeks.

## 2024-07-24 NOTE — HISTORY OF PRESENT ILLNESS
[de-identified] : Mr. Singleton is a 69 year old male following up for prostate cancer. He saw Dr. García for PSA elevation from 5 in 2017 to 9 in March 2018. 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 He is s/p robotic-assisted radical prostatectomy with Dr. Mckenna at [facility] on 3/14/18, with pathology identical to original: [place] 9 prostate adenCa with extensive perineural invasion, sravani-prostatic spread in soft tissue surrounding seminal vesicles, bilateral extensive seminal vesicle involvement and tumor within a few cell layers of bladder base. Soft tissue margins of both seminal vesicles were positive, 4 pelvic nodes were negative. Post-op PSA on 4/10/18 was 1.57. Started on Lupron in April 2018 and treated with external beam radiation in May 2018. Due to intolerable side effects (fatigue, muscle weakness, sexual dysfunction) Jaden decided to hold Lupron in December 2018, at which point his PSA was <0.01. He is now treated with intermittent androgen deprivation, after PSA anup to 2.0 . Last Lupron injection was 7.5 mg on October 9 2020. . PSA had been less than 0.01, and were able to hold Lupron until the PSA anup to greater than 1.    Patient resumed Lupron on 11/19/21 due to rise of PSA to 1.12     . By February 18, 2022 PSA was back down to less than 0.01. A 3-month dose of Lupron was restarted on 8/17/22 The PSA had remained less than 0.01 until 1/15/24 (0.06, and on 5/1/5/24 up again to .06.  [de-identified] : Returns for follow up visit. Next Lupron injection due on 8/14/24. Started Enzalutamide in May 2024.  Patient reports a recent hospital visit on 7/19/24 for an episode of elevated blood pressure, 255/180. He required a dose change for his medication but now complains of inability to sleep at night. Now running around 180/110s.  Today he feels poorly on enzalutamide. He complains of fatigue and like his eyes are going to pop out of his head.   PSA < 0.01 on 7/3/24 after starting Enzalutamide.   5/21/24 PET/CT PSMA: IMPRESSION: 1.  No abnormal uptake at prostatectomy site. 2.  Lymph nodes with increased PSMA expression above and below the diaphragm, with quantitatively low to intermediate PSMA expression. 3.  Osseous metastasis with quantitatively high PSMA expression. 4.  No evidence of visceral metastases.

## 2024-07-24 NOTE — HISTORY OF PRESENT ILLNESS
[de-identified] : Mr. Singleton is a 69 year old male following up for prostate cancer. He saw Dr. García for PSA elevation from 5 in 2017 to 9 in March 2018. 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 He is s/p robotic-assisted radical prostatectomy with Dr. Mckenna at [facility] on 3/14/18, with pathology identical to original: [place] 9 prostate adenCa with extensive perineural invasion, sravani-prostatic spread in soft tissue surrounding seminal vesicles, bilateral extensive seminal vesicle involvement and tumor within a few cell layers of bladder base. Soft tissue margins of both seminal vesicles were positive, 4 pelvic nodes were negative. Post-op PSA on 4/10/18 was 1.57. Started on Lupron in April 2018 and treated with external beam radiation in May 2018. Due to intolerable side effects (fatigue, muscle weakness, sexual dysfunction) Jaden decided to hold Lupron in December 2018, at which point his PSA was <0.01. He is now treated with intermittent androgen deprivation, after PSA anup to 2.0 . Last Lupron injection was 7.5 mg on October 9 2020. . PSA had been less than 0.01, and were able to hold Lupron until the PSA anup to greater than 1.    Patient resumed Lupron on 11/19/21 due to rise of PSA to 1.12     . By February 18, 2022 PSA was back down to less than 0.01. A 3-month dose of Lupron was restarted on 8/17/22 The PSA had remained less than 0.01 until 1/15/24 (0.06, and on 5/1/5/24 up again to .06.  [de-identified] : Returns for follow up visit. Next Lupron injection due on 8/14/24. Started Enzalutamide in May 2024.  Patient reports a recent hospital visit on 7/19/24 for an episode of elevated blood pressure, 255/180. He required a dose change for his medication but now complains of inability to sleep at night. Now running around 180/110s.  Today he feels poorly on enzalutamide. He complains of fatigue and like his eyes are going to pop out of his head.   PSA < 0.01 on 7/3/24 after starting Enzalutamide.   5/21/24 PET/CT PSMA: IMPRESSION: 1.  No abnormal uptake at prostatectomy site. 2.  Lymph nodes with increased PSMA expression above and below the diaphragm, with quantitatively low to intermediate PSMA expression. 3.  Osseous metastasis with quantitatively high PSMA expression. 4.  No evidence of visceral metastases.

## 2024-07-24 NOTE — ADDENDUM
[FreeTextEntry1] : Documented by Otf Chopra acting as scribe for Dr. Landers on  07/24/2024.   All Medical record entries made by the Scribe were at my, Dr. Landers's, direction and personally dictated by me on  07/24/2024. I have reviewed the chart and agree that the record accurately reflects my personal performance of the history, physical exam, assessment and plan. I have also personally directed, reviewed, and agreed with the discharge instructions.

## 2024-08-09 PROBLEM — I10 HYPERTENSION, UNSPECIFIED TYPE: Status: ACTIVE | Noted: 2024-08-09

## 2024-08-09 PROBLEM — Z79.818 ANDROGEN DEPRIVATION THERAPY: Status: ACTIVE | Noted: 2024-08-09

## 2024-08-09 NOTE — CONSULT LETTER
[Dear  ___] : Dear  [unfilled], [Courtesy Letter:] : I had the pleasure of seeing your patient, [unfilled], in my office today. [Please see my note below.] : Please see my note below. [Consult Closing:] : Thank you very much for allowing me to participate in the care of this patient.  If you have any questions, please do not hesitate to contact me. [Sincerely,] : Sincerely, [DrAnthony  ___] : Dr. COWAN [DrAnthony ___] : Dr. COWAN

## 2024-08-09 NOTE — CONSULT LETTER
[Dear  ___] : Dear  [unfilled], [Courtesy Letter:] : I had the pleasure of seeing your patient, [unfilled], in my office today. [Please see my note below.] : Please see my note below. [Consult Closing:] : Thank you very much for allowing me to participate in the care of this patient.  If you have any questions, please do not hesitate to contact me. [Sincerely,] : Sincerely, [DrAtnhony  ___] : Dr. COWAN [DrAnthony ___] : Dr. COWAN

## 2024-08-14 ENCOUNTER — APPOINTMENT (OUTPATIENT)
Age: 69
End: 2024-08-14

## 2024-08-14 ENCOUNTER — APPOINTMENT (OUTPATIENT)
Dept: HEMATOLOGY ONCOLOGY | Facility: CLINIC | Age: 69
End: 2024-08-14

## 2024-08-14 ENCOUNTER — NON-APPOINTMENT (OUTPATIENT)
Age: 69
End: 2024-08-14

## 2024-08-14 DIAGNOSIS — C61 MALIGNANT NEOPLASM OF PROSTATE: ICD-10-CM

## 2024-08-14 NOTE — ADDENDUM
[FreeTextEntry1] : Documented by Otf Chopra acting as scribe for Dr. Landers on  08/14/2024.   All Medical record entries made by the Scribe were at my, Dr. Landers's, direction and personally dictated by me on  08/14/2024. I have reviewed the chart and agree that the record accurately reflects my personal performance of the history, physical exam, assessment and plan. I have also personally directed, reviewed, and agreed with the discharge instructions.

## 2024-08-14 NOTE — HISTORY OF PRESENT ILLNESS
[de-identified] : Mr. Singleton is a 69 year old male following up for prostate cancer. He saw Dr. García for PSA elevation from 5 in 2017 to 9 in March 2018. 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 He is s/p robotic-assisted radical prostatectomy with Dr. Mckenna at [facility] on 3/14/18, with pathology identical to original: [place] 9 prostate adenCa with extensive perineural invasion, sravani-prostatic spread in soft tissue surrounding seminal vesicles, bilateral extensive seminal vesicle involvement and tumor within a few cell layers of bladder base. Soft tissue margins of both seminal vesicles were positive, 4 pelvic nodes were negative. Post-op PSA on 4/10/18 was 1.57. Started on Lupron in April 2018 and treated with external beam radiation in May 2018. Due to intolerable side effects (fatigue, muscle weakness, sexual dysfunction) Jaden decided to hold Lupron in December 2018, at which point his PSA was <0.01. He is now treated with intermittent androgen deprivation, after PSA anup to 2.0 . Last Lupron injection was 7.5 mg on October 9 2020. . PSA had been less than 0.01, and were able to hold Lupron until the PSA anup to greater than 1.    Patient resumed Lupron on 11/19/21 due to rise of PSA to 1.12     . By February 18, 2022 PSA was back down to less than 0.01. A 3-month dose of Lupron was restarted on 8/17/22 The PSA had remained less than 0.01 until 1/15/24 (0.06, and on 5/1/5/24 up again to .06.  [de-identified] : Returns for follow up visit. Next Lupron injection due on 8/14/24. Started Enzalutamide in May 2024.  Patient reports a recent hospital visit on 7/19/24 for an episode of elevated blood pressure, 255/180. He required a dose change for his medication but now complains of inability to sleep at night. Now running around 180/110s.  Today he feels poorly on enzalutamide. He complains of fatigue and like his eyes are going to pop out of his head.   PSA < 0.01 on 7/3/24 after starting Enzalutamide.   5/21/24 PET/CT PSMA: IMPRESSION: 1.  No abnormal uptake at prostatectomy site. 2.  Lymph nodes with increased PSMA expression above and below the diaphragm, with quantitatively low to intermediate PSMA expression. 3.  Osseous metastasis with quantitatively high PSMA expression. 4.  No evidence of visceral metastases.

## 2024-08-14 NOTE — HISTORY OF PRESENT ILLNESS
[de-identified] : Mr. Singleton is a 69 year old male following up for prostate cancer. He saw Dr. García for PSA elevation from 5 in 2017 to 9 in March 2018. 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 He is s/p robotic-assisted radical prostatectomy with Dr. Mckenna at [facility] on 3/14/18, with pathology identical to original: [place] 9 prostate adenCa with extensive perineural invasion, sravani-prostatic spread in soft tissue surrounding seminal vesicles, bilateral extensive seminal vesicle involvement and tumor within a few cell layers of bladder base. Soft tissue margins of both seminal vesicles were positive, 4 pelvic nodes were negative. Post-op PSA on 4/10/18 was 1.57. Started on Lupron in April 2018 and treated with external beam radiation in May 2018. Due to intolerable side effects (fatigue, muscle weakness, sexual dysfunction) Jaden decided to hold Lupron in December 2018, at which point his PSA was <0.01. He is now treated with intermittent androgen deprivation, after PSA anup to 2.0 . Last Lupron injection was 7.5 mg on October 9 2020. . PSA had been less than 0.01, and were able to hold Lupron until the PSA anup to greater than 1.    Patient resumed Lupron on 11/19/21 due to rise of PSA to 1.12     . By February 18, 2022 PSA was back down to less than 0.01. A 3-month dose of Lupron was restarted on 8/17/22 The PSA had remained less than 0.01 until 1/15/24 (0.06, and on 5/1/5/24 up again to .06.  [de-identified] : Returns for follow up visit. Next Lupron injection due on 8/14/24. Started Enzalutamide in May 2024.  Patient reports a recent hospital visit on 7/19/24 for an episode of elevated blood pressure, 255/180. He required a dose change for his medication but now complains of inability to sleep at night. Now running around 180/110s.  Today he feels poorly on enzalutamide. He complains of fatigue and like his eyes are going to pop out of his head.   PSA < 0.01 on 7/3/24 after starting Enzalutamide.   5/21/24 PET/CT PSMA: IMPRESSION: 1.  No abnormal uptake at prostatectomy site. 2.  Lymph nodes with increased PSMA expression above and below the diaphragm, with quantitatively low to intermediate PSMA expression. 3.  Osseous metastasis with quantitatively high PSMA expression. 4.  No evidence of visceral metastases.

## 2024-08-15 ENCOUNTER — RESULT REVIEW (OUTPATIENT)
Age: 69
End: 2024-08-15

## 2024-08-15 ENCOUNTER — APPOINTMENT (OUTPATIENT)
Dept: HEMATOLOGY ONCOLOGY | Facility: CLINIC | Age: 69
End: 2024-08-15
Payer: MEDICARE

## 2024-08-15 VITALS
WEIGHT: 154.1 LBS | HEART RATE: 76 BPM | OXYGEN SATURATION: 96 % | DIASTOLIC BLOOD PRESSURE: 80 MMHG | BODY MASS INDEX: 25.06 KG/M2 | TEMPERATURE: 98.8 F | HEIGHT: 65.75 IN | SYSTOLIC BLOOD PRESSURE: 123 MMHG | RESPIRATION RATE: 18 BRPM

## 2024-08-15 DIAGNOSIS — Z79.818 LONG TERM (CURRENT) USE OF OTHER AGENTS AFFECTING ESTROGEN RECEPTORS AND ESTROGEN LEVELS: ICD-10-CM

## 2024-08-15 DIAGNOSIS — G89.29 OTHER CHRONIC PAIN: ICD-10-CM

## 2024-08-15 DIAGNOSIS — I10 ESSENTIAL (PRIMARY) HYPERTENSION: ICD-10-CM

## 2024-08-15 DIAGNOSIS — M54.16 RADICULOPATHY, LUMBAR REGION: ICD-10-CM

## 2024-08-15 PROCEDURE — 99215 OFFICE O/P EST HI 40 MIN: CPT

## 2024-08-15 PROCEDURE — G2211 COMPLEX E/M VISIT ADD ON: CPT

## 2024-08-15 RX ORDER — ABIRATERONE ACETATE 250 MG/1
250 TABLET, FILM COATED ORAL
Qty: 120 | Refills: 11 | Status: ACTIVE | COMMUNITY
Start: 2024-08-15 | End: 1900-01-01

## 2024-08-15 RX ORDER — PREDNISONE 5 MG/1
5 TABLET ORAL
Qty: 30 | Refills: 11 | Status: ACTIVE | COMMUNITY
Start: 2024-08-15 | End: 1900-01-01

## 2024-08-15 NOTE — REVIEW OF SYSTEMS
[Fatigue] : fatigue [Diarrhea: Grade 2 - Increase of 4 - 6 stools per day over baseline; moderate increase in ostomy output compared to baseline] : Diarrhea: Grade 2 - Increase of 4 - 6 stools per day over baseline; moderate increase in ostomy output compared to baseline [Incontinence] : incontinence [Joint Pain] : joint pain [Hot Flashes] : hot flashes [Negative] : ENT [Fever] : no fever [Chills] : no chills [Recent Change In Weight] : ~T no recent weight change [Chest Pain] : no chest pain [Palpitations] : no palpitations [Lower Ext Edema] : no lower extremity edema [Wheezing] : no wheezing [Cough] : no cough [SOB on Exertion] : no shortness of breath during exertion [Abdominal Pain] : no abdominal pain [Vomiting] : no vomiting [Constipation] : no constipation [Dysuria] : no dysuria [Joint Stiffness] : no joint stiffness [Muscle Pain] : no muscle pain [Skin Rash] : no skin rash [Dizziness] : no dizziness [Difficulty Walking] : no difficulty walking [Anxiety] : no anxiety [Depression] : no depression [Muscle Weakness] : no muscle weakness [Easy Bruising] : no tendency for easy bruising [FreeTextEntry9] : no cramps [de-identified] : no pruritus [de-identified] : Occ HA, some cognitive, no falls.

## 2024-08-15 NOTE — REVIEW OF SYSTEMS
[Fatigue] : fatigue [Diarrhea: Grade 2 - Increase of 4 - 6 stools per day over baseline; moderate increase in ostomy output compared to baseline] : Diarrhea: Grade 2 - Increase of 4 - 6 stools per day over baseline; moderate increase in ostomy output compared to baseline [Incontinence] : incontinence [Joint Pain] : joint pain [Hot Flashes] : hot flashes [Negative] : ENT [Fever] : no fever [Chills] : no chills [Recent Change In Weight] : ~T no recent weight change [Chest Pain] : no chest pain [Palpitations] : no palpitations [Lower Ext Edema] : no lower extremity edema [Wheezing] : no wheezing [Cough] : no cough [SOB on Exertion] : no shortness of breath during exertion [Abdominal Pain] : no abdominal pain [Vomiting] : no vomiting [Constipation] : no constipation [Dysuria] : no dysuria [Joint Stiffness] : no joint stiffness [Muscle Pain] : no muscle pain [Skin Rash] : no skin rash [Dizziness] : no dizziness [Difficulty Walking] : no difficulty walking [Anxiety] : no anxiety [Depression] : no depression [Muscle Weakness] : no muscle weakness [Easy Bruising] : no tendency for easy bruising [FreeTextEntry9] : no cramps [de-identified] : no pruritus [de-identified] : Occ HA, some cognitive, no falls.

## 2024-08-15 NOTE — RESULTS/DATA
[FreeTextEntry1] : Surgical Final Report --2018 Final Diagnosis  1. PROSTATE AND SEMINAL VESICLES (RADICAL PROSTATECTOMY, 47.6 G): - PROSTATIC ADENOCARCINOMA, PROGNOSTIC GRADE GROUP 5 (VANI SCORE 5+4 = 9). - TUMOR OCCUPIES ROUGHLY 40% OF GLANDULAR VOLUME. - EXTENSIVE PERINEURAL INVASION. - ESHA-PROSTATIC SPREAD PRESENT IN THE RIGHT POSTERIOR REGION AND IN SOFT TISSUE SURROUNDING SEMINAL VESICLES. - SEMINAL VESICLES BILATERALLY EXTENSIVELY INVOLVED BY TUMOR. - TUMOR IS FOCALLY PRESENT AT SOFT TISSUE MARGINS OF BOTH SEMINAL VESICLES. - TUMOR IS PRESENT WITHIN A FEW CELL LAYERS OF THE BLADDER BASE MARGIN - ALL OTHER MARGINS ARE FREE OF TUMOR. - Reactive urothelium with "cystitis glandularis."  2. Lymph nodes (right pelvic): - Two lymph nodes negative for malignancy (0/2).  3. Lymph nodes (left pelvic): - Two lymph nodes negative for malignancy (0/2).  JALEESA BRIDGES M.D. (Electronic Signature) Reported on: 03/09/18 *************************************************************************** Surgical Final Report  2018           Final Diagnosis           1. Prostate, right base medial, biopsy -Adenocarcinoma of the prostate, Prognostic Grade Group 5           (Novi score 4+5=9) involving 90% (8 mm in length) of 1 of 1 core(s).            2. Prostate, right base lateral, biopsy -Adenocarcinoma of the prostate, Prognostic Grade Group 5 (Novi score 4+5=9) involving 100% (9.5 mm in length) of 1 of 1 core(s). -Perineural invasion is identified.            3. Prostate, right mid medial, biopsy -Adenocarcinoma of the prostate, Prognostic Grade Group 5 (Vani score 4+5=9) involving 90% (14 mm in length) of 1 of 1 core(s). -Perineural invasion is identified.            4. Prostate, right mid lateral, biopsy -Adenocarcinoma of the prostate, Prognostic Grade Group 5 (Novi score 4+5=9) involving 100% (12.5 mm in length) of 1 of 1 core(s).            5. Prostate, right apex medial, biopsy -Adenocarcinoma of the prostate, Prognostic Grade Group 4 (Novi score 4+4=8) involving 80% (10.5 mm in length) of 1 of 1 core(s).            6. Prostate, right apex lateral, biopsy -Adenocarcinoma of the prostate, Prognostic Grade Group 4 (Vani score 4+4=8) involving 100% (14 mm in length) of 1 of 1 core(s).            7. Prostate, left base medial, biopsy -Adenocarcinoma of the prostate, Prognostic Grade Group 5 (Novi score 4+5=9) involving 80% (6 mm in length) of 1 of 1 core(s).            8. Prostate, left base lateral, biopsy -Adenocarcinoma of the prostate, Prognostic Grade Group 5 (Vani score 4+5=9) involving 75% (11 mm in length) of 1 of 1 core(s).            9. Prostate, left mid medial, biopsy -Adenocarcinoma of the prostate, Prognostic Grade Group 5 (Vani score 4+5=9) involving 75% (11.5 mm in length) of 1 of 1 core(s).             10. Prostate, left mid lateral, biopsy -Adenocarcinoma of the prostate, Prognostic Grade Group 5 (Novi score 4+5=9) involving 40% (3 mm in length) of 1 of 1 core(s).            11. Prostate, left apex medial, biopsy -Adenocarcinoma of the prostate, Prognostic Grade Group 4 (Novi score 4+4=8) involving 5% (0.5 mm in length) of 1 of 1 core(s).            12. Prostate, left apex lateral, biopsy -Adenocarcinoma of the prostate, Prognostic Grade Group 5 (Novi score 4+5=9) involving 25% (3 mm in length) of 1 of 1 core(s).            Comment:           Case reported to Tumor Registry.            Kassandra Rascon M.D., Chief of Genitourinary Pathology           (Electronic Signature)           Reported on: 02/01/18 ********************************************************** EXAM:  CT ABDOMEN AND PELVIS OC IC PROCEDURE DATE:  05/24/2021 INTERPRETATION:  CLINICAL INFORMATION: Prostate cancer COMPARISON: Abdominal CT dated 02/08/2018 CONTRAST/COMPLICATIONS: IV Contrast: Omnipaque 300  90 cc administered   10 cc discarded Oral Contrast: Omnipaque 300 Complications: None reported at time of study completion  PROCEDURE: CT of the Abdomen and Pelvis was performed. Sagittal and coronal reformats were performed.  FINDINGS: LOWER CHEST: Small hiatal hernia.  LIVER: Within normal limits. BILE DUCTS: Normal caliber. GALLBLADDER: Within normal limits. SPLEEN: Within normal limits. PANCREAS: Within normal limits. ADRENALS: Within normal limits. KIDNEYS/URETERS: The kidneys are normal in size without hydronephrosis. There is 1 cm simple appearing cyst in upper pole of the left kidney.  BLADDER: Within normal limits. REPRODUCTIVE ORGANS: Prior prostatectomy. The prostatectomy bed is unremarkable.  BOWEL: No bowel obstruction. Appendix is normal. PERITONEUM: No ascites. VESSELS: Atherosclerotic changes. RETROPERITONEUM/LYMPH NODES: No lymphadenopathy. ABDOMINAL WALL: Within normal limits. BONES: No sclerotic or lytic lesions are identified.  IMPRESSION: Prior prostatectomy. No CT evidence of local recurrence or metastatic disease. YI LEIVA M.D., ATTENDING RADIOLOGIST Phone #: [phone] This document has been electronically signed. May 26 2021 ****************************************************************** EXAM:  NM BONE IMG WHOLE BODY EXAM:  NM BONE SPECT CT SA SD PROCEDURE DATE:  05/24/2021 INTERPRETATION:  RADIOPHARMACEUTICAL: 21.3mCi Tc-99m HDP, I.V. CLINICAL STATEMENT: 66-year-old male with history of prostate cancer referred for follow-up bone scan. Patient had recent trauma to the head.  TECHNIQUE:  Anterior and posterior whole body images were obtained 2-3 hours following administration of radiotracer. SPECT/CT of the lumbosacral region was performed.. The CT protocol was optimized for SPECT attenuation correction and to provide anatomic detail for localization of SPECT abnormalities. The CT protocol was not designed to produce and cannot replace state-of- the-art diagnostic CT images with specific imaging protocols for different body parts and indications.  COMPARISON: Bone scan from 2/8/2018.  FINDINGS: There is new mild focal uptake in the mid frontal region of the skull likely secondary to posttraumatic change. There is new mild uptake in the left ischium with no definite abnormality on CT and may represent enthesopathy or degenerative disease. Again seen are unchanged degenerative changes in the spine and major joints. No abnormal focus of increased or decreased activity suggestive of osseous metastasis is identified. Both kidneys are visualized and are symmetric in appearance. IMPRESSION: Bone scan demonstrates: New posttraumatic changes in the mid frontal region of the skull. Degenerative changes in the spine and major joints. No scan evidence of osseous metastasis. JAMIL MARSHALL MD; Attending Nuclear Medicine This document has been electronically signed. May 24 2021 ************************************************************************ EXAM: [id number] - PETCT WB PSMA INIT  - ORDERED BY: EVELYNE SMITH PROCEDURE DATE:  05/20/2024 INTERPRETATION:  CLINICAL INFORMATION: Prostate cancer diagnosed in 2018, status post radiation therapy. Currently on ADT. Most recent PSA: 0.06 Vani score at diagnosis: 9  TREATMENT STRATEGY EVALUATION: Initial RADIOPHARMACEUTICAL: 10.05 mCi F-18 Piflufolastat (Pylarify), I.V. I.V. SITE: antecubital left UPTAKE PERIOD: 60 min SCANNER: [employer] [id number]  TECHNIQUE: Following intravenous injection of radiopharmaceutical and above uptake period, PET/CT was obtained from vertex of skull to below the knees. CT was performed during shallow respiration. CT protocol was optimized for PET attenuation correction and anatomic localization and was not designed to produce and cannot replace state-of-the-art diagnostic CT images with specific imaging protocols for different body parts and indications. Images were reconstructed and reviewed in axial, coronal, and sagittal views and three-dimensional MIP.  Standardized uptake values (SUV) are normalized to patient body weight and indicate the highest activity concentration (SUVmax) in a given site. All image numbers refer to axial image number. PET findings are scored using the Molecular Imaging PSMA Expression Score (Score): Score 0: Uptake < blood pool - No PSMA expression Score 1: Uptake > blood pool AND < liver - Low PSMA expression Score 2: Uptake > liver AND < parotid gland - Intermediate expression Score 3: Uptake > parotid gland - High expression (Kadi, et. al. Prostate Cancer Molecular Imaging Standardized Evaluation (PROMISE): Proposed miTNM Classification for the Interpretation of PSMA-Ligand PET/CT. J Nucl Med 2018; 59:469-478). COMPARISON:  No prior PSMA-PET/CT OTHER STUDIES USED FOR CORRELATION: Bone scan and CT abdomen/pelvis 5/24/2021 FINDINGS:  HEAD/NECK: Physiologic radiotracer activity in lacrimal and salivary glands. For reference, mean SUV in RIGHT parotid: 12.4. Carotid atherosclerosis is noted.  THORAX: Increased uptake is seen at multiple thoracic lymph nodes. Reference examples include:  Left thoracic inlet, ill-defined on low-dose CT (SUV 6.4, image 94) Small LEFT thoracic inlet node (0.8 cm, SUV 6.0, image 94) Anterior mediastinum (SUV 4.4, image 107)  LUNGS: No abnormal radiotracer activity. No lung nodule.  PLEURA/PERICARDIUM: No abnormal radiotracer activity. No pleural or pericardial effusion.  ESOPHAGUS/STOMACH: No abnormal activity.  HEPATOBILIARY/PANCREAS: Physiologic radiotracer activity. For reference, liver SUV mean is 4.4.  SPLEEN: Physiologic activity. Normal size.  ADRENAL GLANDS: No abnormal radiotracer activity. No nodule.  KIDNEYS/URINARY BLADDER: Physiologic excreted radiotracer activity.  REPRODUCTIVE ORGANS: Prostatectomy site is unremarkable.  ABDOMINOPELVIC LYMPH NODES/RETROPERITONEUM: Suspected LEFT retrocrural node (SUV 10.1, image 163), and RIGHT para-aortic versus a ganglion (SUV 6.3, image 133) is suspected LEFT retroperitoneal node (SUV 4.1, image 229).  BOWEL/PERITONEUM/MESENTERY: Physiologic radiotracer activity.  ABDOMINAL WALL: No abnormal radiotracer activity.  BONES/SOFT TISSUES: Foci of intensely increased PSMA expression seen within the skeleton consistent with metastatic disease. Examples include:  Right glenoid (SUV 64.5, image 87) Right fifth rib (SUV 26.3, image 133) Medial RIGHT clavicle (SUV 8.0, image 96)  VESSELS: Atherosclerotic calcification of nonaneurysmal abdominal aorta including visceral and/or runoff branches.  IMPRESSION: 1.  No abnormal uptake at prostatectomy site. 2.  Lymph nodes with increased PSMA expression above and below the diaphragm, with quantitatively low to intermediate PSMA expression. 3.  Osseous metastasis with quantitatively high PSMA expression. 4.  No evidence of visceral metastases. --- End of Report --- JAJA KUHN MD; Attending Radiologist This document has been electronically signed. May 21 2024 ****************************************************************

## 2024-08-15 NOTE — PHYSICAL EXAM
[Fully active, able to carry on all pre-disease performance without restriction] : Status 0 - Fully active, able to carry on all pre-disease performance without restriction [Normal] : affect appropriate [de-identified] : no edema noted [de-identified] : no gynecomastia

## 2024-08-15 NOTE — PHYSICAL EXAM
[Fully active, able to carry on all pre-disease performance without restriction] : Status 0 - Fully active, able to carry on all pre-disease performance without restriction [Normal] : affect appropriate [de-identified] : no edema noted [de-identified] : no gynecomastia

## 2024-08-15 NOTE — RESULTS/DATA
[FreeTextEntry1] : Surgical Final Report --2018 Final Diagnosis  1. PROSTATE AND SEMINAL VESICLES (RADICAL PROSTATECTOMY, 47.6 G): - PROSTATIC ADENOCARCINOMA, PROGNOSTIC GRADE GROUP 5 (VNAI SCORE 5+4 = 9). - TUMOR OCCUPIES ROUGHLY 40% OF GLANDULAR VOLUME. - EXTENSIVE PERINEURAL INVASION. - ESHA-PROSTATIC SPREAD PRESENT IN THE RIGHT POSTERIOR REGION AND IN SOFT TISSUE SURROUNDING SEMINAL VESICLES. - SEMINAL VESICLES BILATERALLY EXTENSIVELY INVOLVED BY TUMOR. - TUMOR IS FOCALLY PRESENT AT SOFT TISSUE MARGINS OF BOTH SEMINAL VESICLES. - TUMOR IS PRESENT WITHIN A FEW CELL LAYERS OF THE BLADDER BASE MARGIN - ALL OTHER MARGINS ARE FREE OF TUMOR. - Reactive urothelium with "cystitis glandularis."  2. Lymph nodes (right pelvic): - Two lymph nodes negative for malignancy (0/2).  3. Lymph nodes (left pelvic): - Two lymph nodes negative for malignancy (0/2).  JALEESA BRIDGES M.D. (Electronic Signature) Reported on: 03/09/18 *************************************************************************** Surgical Final Report  2018           Final Diagnosis           1. Prostate, right base medial, biopsy -Adenocarcinoma of the prostate, Prognostic Grade Group 5           (Brick score 4+5=9) involving 90% (8 mm in length) of 1 of 1 core(s).            2. Prostate, right base lateral, biopsy -Adenocarcinoma of the prostate, Prognostic Grade Group 5 (Brick score 4+5=9) involving 100% (9.5 mm in length) of 1 of 1 core(s). -Perineural invasion is identified.            3. Prostate, right mid medial, biopsy -Adenocarcinoma of the prostate, Prognostic Grade Group 5 (Vani score 4+5=9) involving 90% (14 mm in length) of 1 of 1 core(s). -Perineural invasion is identified.            4. Prostate, right mid lateral, biopsy -Adenocarcinoma of the prostate, Prognostic Grade Group 5 (Brick score 4+5=9) involving 100% (12.5 mm in length) of 1 of 1 core(s).            5. Prostate, right apex medial, biopsy -Adenocarcinoma of the prostate, Prognostic Grade Group 4 (Brick score 4+4=8) involving 80% (10.5 mm in length) of 1 of 1 core(s).            6. Prostate, right apex lateral, biopsy -Adenocarcinoma of the prostate, Prognostic Grade Group 4 (Vani score 4+4=8) involving 100% (14 mm in length) of 1 of 1 core(s).            7. Prostate, left base medial, biopsy -Adenocarcinoma of the prostate, Prognostic Grade Group 5 (Brick score 4+5=9) involving 80% (6 mm in length) of 1 of 1 core(s).            8. Prostate, left base lateral, biopsy -Adenocarcinoma of the prostate, Prognostic Grade Group 5 (Vani score 4+5=9) involving 75% (11 mm in length) of 1 of 1 core(s).            9. Prostate, left mid medial, biopsy -Adenocarcinoma of the prostate, Prognostic Grade Group 5 (Vani score 4+5=9) involving 75% (11.5 mm in length) of 1 of 1 core(s).             10. Prostate, left mid lateral, biopsy -Adenocarcinoma of the prostate, Prognostic Grade Group 5 (Brick score 4+5=9) involving 40% (3 mm in length) of 1 of 1 core(s).            11. Prostate, left apex medial, biopsy -Adenocarcinoma of the prostate, Prognostic Grade Group 4 (Brick score 4+4=8) involving 5% (0.5 mm in length) of 1 of 1 core(s).            12. Prostate, left apex lateral, biopsy -Adenocarcinoma of the prostate, Prognostic Grade Group 5 (Brick score 4+5=9) involving 25% (3 mm in length) of 1 of 1 core(s).            Comment:           Case reported to Tumor Registry.            Kassandra Rascon M.D., Chief of Genitourinary Pathology           (Electronic Signature)           Reported on: 02/01/18 ********************************************************** EXAM:  CT ABDOMEN AND PELVIS OC IC PROCEDURE DATE:  05/24/2021 INTERPRETATION:  CLINICAL INFORMATION: Prostate cancer COMPARISON: Abdominal CT dated 02/08/2018 CONTRAST/COMPLICATIONS: IV Contrast: Omnipaque 300  90 cc administered   10 cc discarded Oral Contrast: Omnipaque 300 Complications: None reported at time of study completion  PROCEDURE: CT of the Abdomen and Pelvis was performed. Sagittal and coronal reformats were performed.  FINDINGS: LOWER CHEST: Small hiatal hernia.  LIVER: Within normal limits. BILE DUCTS: Normal caliber. GALLBLADDER: Within normal limits. SPLEEN: Within normal limits. PANCREAS: Within normal limits. ADRENALS: Within normal limits. KIDNEYS/URETERS: The kidneys are normal in size without hydronephrosis. There is 1 cm simple appearing cyst in upper pole of the left kidney.  BLADDER: Within normal limits. REPRODUCTIVE ORGANS: Prior prostatectomy. The prostatectomy bed is unremarkable.  BOWEL: No bowel obstruction. Appendix is normal. PERITONEUM: No ascites. VESSELS: Atherosclerotic changes. RETROPERITONEUM/LYMPH NODES: No lymphadenopathy. ABDOMINAL WALL: Within normal limits. BONES: No sclerotic or lytic lesions are identified.  IMPRESSION: Prior prostatectomy. No CT evidence of local recurrence or metastatic disease. YI LEIVA M.D., ATTENDING RADIOLOGIST Phone #: [phone] This document has been electronically signed. May 26 2021 ****************************************************************** EXAM:  NM BONE IMG WHOLE BODY EXAM:  NM BONE SPECT CT SA SD PROCEDURE DATE:  05/24/2021 INTERPRETATION:  RADIOPHARMACEUTICAL: 21.3mCi Tc-99m HDP, I.V. CLINICAL STATEMENT: 66-year-old male with history of prostate cancer referred for follow-up bone scan. Patient had recent trauma to the head.  TECHNIQUE:  Anterior and posterior whole body images were obtained 2-3 hours following administration of radiotracer. SPECT/CT of the lumbosacral region was performed.. The CT protocol was optimized for SPECT attenuation correction and to provide anatomic detail for localization of SPECT abnormalities. The CT protocol was not designed to produce and cannot replace state-of- the-art diagnostic CT images with specific imaging protocols for different body parts and indications.  COMPARISON: Bone scan from 2/8/2018.  FINDINGS: There is new mild focal uptake in the mid frontal region of the skull likely secondary to posttraumatic change. There is new mild uptake in the left ischium with no definite abnormality on CT and may represent enthesopathy or degenerative disease. Again seen are unchanged degenerative changes in the spine and major joints. No abnormal focus of increased or decreased activity suggestive of osseous metastasis is identified. Both kidneys are visualized and are symmetric in appearance. IMPRESSION: Bone scan demonstrates: New posttraumatic changes in the mid frontal region of the skull. Degenerative changes in the spine and major joints. No scan evidence of osseous metastasis. JAMIL MARSHALL MD; Attending Nuclear Medicine This document has been electronically signed. May 24 2021 ************************************************************************ EXAM: [id number] - PETCT WB PSMA INIT  - ORDERED BY: EVELYNE SMITH PROCEDURE DATE:  05/20/2024 INTERPRETATION:  CLINICAL INFORMATION: Prostate cancer diagnosed in 2018, status post radiation therapy. Currently on ADT. Most recent PSA: 0.06 Vani score at diagnosis: 9  TREATMENT STRATEGY EVALUATION: Initial RADIOPHARMACEUTICAL: 10.05 mCi F-18 Piflufolastat (Pylarify), I.V. I.V. SITE: antecubital left UPTAKE PERIOD: 60 min SCANNER: GE Discovery [id number]  TECHNIQUE: Following intravenous injection of radiopharmaceutical and above uptake period, PET/CT was obtained from vertex of skull to below the knees. CT was performed during shallow respiration. CT protocol was optimized for PET attenuation correction and anatomic localization and was not designed to produce and cannot replace state-of-the-art diagnostic CT images with specific imaging protocols for different body parts and indications. Images were reconstructed and reviewed in axial, coronal, and sagittal views and three-dimensional MIP.  Standardized uptake values (SUV) are normalized to patient body weight and indicate the highest activity concentration (SUVmax) in a given site. All image numbers refer to axial image number. PET findings are scored using the Molecular Imaging PSMA Expression Score (Score): Score 0: Uptake < blood pool - No PSMA expression Score 1: Uptake > blood pool AND < liver - Low PSMA expression Score 2: Uptake > liver AND < parotid gland - Intermediate expression Score 3: Uptake > parotid gland - High expression (Kadi, et. al. Prostate Cancer Molecular Imaging Standardized Evaluation (PROMISE): Proposed miTNM Classification for the Interpretation of PSMA-Ligand PET/CT. J Nucl Med 2018; 59:469-478). COMPARISON:  No prior PSMA-PET/CT OTHER STUDIES USED FOR CORRELATION: Bone scan and CT abdomen/pelvis 5/24/2021 FINDINGS:  HEAD/NECK: Physiologic radiotracer activity in lacrimal and salivary glands. For reference, mean SUV in RIGHT parotid: 12.4. Carotid atherosclerosis is noted.  THORAX: Increased uptake is seen at multiple thoracic lymph nodes. Reference examples include:  Left thoracic inlet, ill-defined on low-dose CT (SUV 6.4, image 94) Small LEFT thoracic inlet node (0.8 cm, SUV 6.0, image 94) Anterior mediastinum (SUV 4.4, image 107)  LUNGS: No abnormal radiotracer activity. No lung nodule.  PLEURA/PERICARDIUM: No abnormal radiotracer activity. No pleural or pericardial effusion.  ESOPHAGUS/STOMACH: No abnormal activity.  HEPATOBILIARY/PANCREAS: Physiologic radiotracer activity. For reference, liver SUV mean is 4.4.  SPLEEN: Physiologic activity. Normal size.  ADRENAL GLANDS: No abnormal radiotracer activity. No nodule.  KIDNEYS/URINARY BLADDER: Physiologic excreted radiotracer activity.  REPRODUCTIVE ORGANS: Prostatectomy site is unremarkable.  ABDOMINOPELVIC LYMPH NODES/RETROPERITONEUM: Suspected LEFT retrocrural node (SUV 10.1, image 163), and RIGHT para-aortic versus a ganglion (SUV 6.3, image 133) is suspected LEFT retroperitoneal node (SUV 4.1, image 229).  BOWEL/PERITONEUM/MESENTERY: Physiologic radiotracer activity.  ABDOMINAL WALL: No abnormal radiotracer activity.  BONES/SOFT TISSUES: Foci of intensely increased PSMA expression seen within the skeleton consistent with metastatic disease. Examples include:  Right glenoid (SUV 64.5, image 87) Right fifth rib (SUV 26.3, image 133) Medial RIGHT clavicle (SUV 8.0, image 96)  VESSELS: Atherosclerotic calcification of nonaneurysmal abdominal aorta including visceral and/or runoff branches.  IMPRESSION: 1.  No abnormal uptake at prostatectomy site. 2.  Lymph nodes with increased PSMA expression above and below the diaphragm, with quantitatively low to intermediate PSMA expression. 3.  Osseous metastasis with quantitatively high PSMA expression. 4.  No evidence of visceral metastases. --- End of Report --- JAJA KUHN MD; Attending Radiologist This document has been electronically signed. May 21 2024 ****************************************************************

## 2024-08-15 NOTE — ASSESSMENT
[Palliative Care Plan] : not applicable at this time [Designated Health Care Proxy] : Designated Health Care Proxy [Name: ___] : Name: [unfilled] [Relationship: ___] : Relationship: [unfilled] [FreeTextEntry1] : Jaden Singleton was seen in the office today at the request of Dr. Pipe Landers.  He presented with his , with whom he has lived for more than 30 years.  He was diagnosed with prostate cancer in February 2018 with [place] grade group 5 tumor.  His PSA was reportedly 5 at that time and he was found to have a hard prostate on examination.  He underwent a radical prostatectomy, revealing extensive perineural invasion with periprostatic spread in the right posterior region and in the soft tissue surrounding the seminal vesicles.  Both seminal vesicles were involved by tumor.  Tumor was focally present at the soft tissue margin of both seminal vesicles and was present within a few cell layers of the bladder margin base.  Lymph nodes removed were negative.  His postoperative PSA was 1.57, and he was started on Lupron at that time and he received external beam radiation therapy in May 2018.  Due to adverse events, he decided to hold off on Lupron in December 2018 and then was resumed again in November 19, 2021 when his PSA was increased to 1.12.  The first PSA in our laboratory system was 0.05 in May 2018 after starting ADT.  Subsequently, the PSAs were undetectable from August 2018 through March 2019 and then the PSA began to rise.  In February 2020 it was up to 2.09.  It then became undetectable once again from May 2020 through February 2021 and it anup to 1.12 in November 2021 and was undetectable again from May 2022 through August 2023.  In May 2024 the PSA was 0.15 and he was started on enzalutamide subsequently.  His PSA was undetectable on July 3.  A PSMA PET scan done in May 2024 revealed some evidence of distant metastasis, although the patient did not clearly understand this.  After starting on enzalutamide in May 2024, he had significant complaints.  He was recently hospitalized in July for an episode of an elevated blood pressure, reportedly at 255/180.  He had headaches at that time.  He complains of inability to sleep at night and his blood pressure has remained notably elevated.  At the last visit he complained that he felt unwell on enzalutamide and it was discontinued.  The onset of the symptoms correlated with the start of enzalutamide, and now after 1 month off of treatment, the symptoms have improved.  A comprehensive history was obtained, and a physical examination was performed.  The review of systems revealed some degree of fatigue.  There was no recent weight loss.  He does have some joint pains of chronic nature.  There were no muscle cramps.  He does have some cognitive issues over the course of the past 3 years.  There is an occasional headache.  He has not fallen down.  He denies any anxiety or depression.  He does have some hot flushes.  There is no easy bruising.  On physical examination, he appears well.  His performance status is 0.  His blood pressure was 123/80 and his weight was 69.9 kg, stable.  The oxygen saturation was 96%.  The physical examination was unremarkable.  There was no palpable adenopathy, and there was no tenderness to percussion or palpation over the spinal column or other bones.  Today's chemistry panel revealed normal findings with the exception of a glucose of 136 and a total bilirubin of 1.3.  The alkaline phosphatase was 121, and are upper it was normal on the last determination, but was elevated to 135 and 126 in August 2023, and in May 2024.  Today's PSA was once again undetectable.  The white blood cell count was 13.1 with a hemoglobin value of 15.9.  The platelet count was 219,000.  Previously his white blood cell count has been normal.  There was neutrophil predominance in the differential.  There is no evidence of infection at this time.  We reviewed his PSMA PET scan from May 20.  His PSA was only 0.06 at the time the PET scan was performed.  There was a small left thoracic inlet node measuring 8 mm in size with an SUV of 6.  There were no lung nodules.  The liver was normal.  There was a suspected left retrocrural node with an SUV of 10.1.  A right para-aortic node versus a ganglion on showed an SUV of 6.3 and a suspected left retroperitoneal lymph node had an SUV of 4.1.  There was a focus of intensely increased PSMA expression seen within the skeleton consistent with metastatic disease involving the right glenoid, the right fifth rib, and the right medial clavicle.  We discussed the meaning of these findings.  I explained to them that he has stage IV disease and that his disease is incurable, but treatable and controllable for a long time in many cases.  His  was struck by this, as they did not realize that he had stage IV disease.  I explained that it is not like stage IV disease and other tumor types.  We contrasted other tumors to the behavior of prostate cancer as well as the treatments that are administered for prostate cancer.  These findings on imaging were new compared to his initial standard radiologic imaging in 2021.  The PET scan is more sensitive, and it is possible that some of these findings may have been present in 2021.  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  Enzalutamide was chosen, which she has had difficulty in tolerating.  Approximately 20% of patients have significant lethargy and fatigue associated with this medication, and I usually prefer the use of abiraterone as first treatment.  In addition, patients who progressed on abiraterone may have clinical benefit from enzalutamide subsequently.  And a crossover trial from the the [place] group, using a 30% PSA response, the response of enzalutamide after abiraterone was 36% whereas the response to abiraterone after enzalutamide was only 4%.  For this reason, I prefer the use of abiraterone before enzalutamide.  Since he does not have resistance to enzalutamide, it is likely he may benefit from abiraterone.  He had a rising PSA before the initiation of this recent treatment, but it does not appear that circulating tumor DNA was assessed for abnormalities.  Unfortunately it would be a waste of money at this point given an undetectable PSA.  A DEXA scan is needed for assessment for osteoporosis or osteopenia.  I explained that I would prefer that he be on abiraterone at this time.  I discussed the adverse effects that may occur with the medication including, but not limited to, fluid retention, elevated blood pressure, transaminase elevations, increased fatigue, and a mild increase in cardiology events when compared to androgen deprivation therapy alone.  A prescription was sent to his pharmacy for abiraterone and prednisone and he was given instructions as to how to take the medications.  He has an appointment in follow-up with Dr. Landers on September 11 and he will see him at that point.  He was informed that I am leaving the institution and I will be located in [place] at our cancer center there from mid October on.  All questions were answered to the best of my ability and to their apparent satisfaction.  I have sent an email to Dr. Landers and I asked him to contact me for discussion.

## 2024-08-15 NOTE — HISTORY OF PRESENT ILLNESS
[Disease: _____________________] : Disease: [unfilled] [T: ___] : T[unfilled] [N: ___] : N[unfilled] [M: ___] : M[unfilled] [AJCC Stage: ____] : AJCC Stage: [unfilled] [de-identified] : Jaden Singleton is seen today for second opinion, at the request of Dr. Pipe Landers.  He presented with his .   He was diagnosed with prostate cancer by biopsy in February 2018, with [place] grade group 5 tumor.  His PSA was reportedly 5 at that time and he was found to have a hard prostate.  He underwent a radical prostatectomy revealing extensive perineural invasion, periprostatic spread in the right posterior region and in the soft tissues surrounding the seminal vesicles.  Both seminal vesicles were involved by tumor.  Tumor was focally present at the soft tissue margin of both seminal vesicles and was present within a few cell layers of the bladder base margin.  Lymph nodes were negative.  Notes indicate that his postoperative PSA was 1.57 on April 10, 2018.  He was started on Lupron at that time and received external beam radiation therapy in May 2018.  Due to adverse events, he decided to hold off on Lupron in December 2018 and resumed treatment on November 19, 2021 when his PSA was 1.12.  The first PSA in our laboratory reveals a value of 0.05 in May 2018.  Subsequently, the PSAs were undetectable from August 2018 through March 2019.  The PSA began to rise, and it was 2.09 in February 2020.  It then declined to undetectable levels once again from May 2020 through February 2021.  The PSA anup to 1.12 in November 2021, and it was undetectable again from May 2022 through August 2023.  In May 2024 the PSA was 0.15 and was once again undetectable on July 3, 2024.  He was started on enzalutamide in May 2024.  He had a recent hospital visit in July 2024 for an episode of an elevated blood pressure, reportedly at 255/180.  He complains of inability to sleep at night, and his blood pressure remains notably elevated.  At the last visit he complained that he felt unwell on enzalutamide.  Dr. Landers felt that the onset of these symptoms correlates with the start of enzalutamide, and he asked the patient to discontinue the treatment at the last visit, one month ago.   He had his Lupron shot yesterday and he feels "very drained" today. He had lower back last night. Not at the site n injections. Had fevers and chills last night. Took temp, was 101.4. No temp this AM, took Tylenol x 2 last night. No dysuria, no hematuria. Flow is "good", nocturia x 5-6 at times. Has incontinence, no pads, no diaper. No libido, no erections, since the surgery. Back pain is intermittently present, but then says he has pain all the time. shoulders. 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 Some days he does not take oxycodone. Low back pain in intermittent as well. Aching quality: may awaken him, better if he sleeps on his sie, better with walking.  No cough, no BRIGHT, no chest pain/pressure. No palpitations. Appetite is good, no weight loss/gain. No N/V/C. Diarrhea "all the time", has seen GI doctor, 5-6 times a day, watery or loose, often after meals, no abd cramps, no blood. "GI doc said all was okay". Hot flushes occur about 3 days a week, with sweats. fatigue "all the time". No energy, occ naps, which helps. Occ headahces: 3-4 times a week, may last most of the day, takes Advil. No visual changes with the HA. No dizziness, no balance issues, no falls.  is concerned about cognitive issues, worsened by enzalutamide. Feels better after stopping Xtandi. He felt his "eyes were popping out" on Xtandi. Better since off Xtandi. Seen by neurologist for cognitive issues, forgetful. was started on donezepil, which  felt was helping.  [de-identified] : Aditya grade group 5 (5+4) [de-identified] : Initial PSA 5.0 [de-identified] : Intermittent therapy with ADT due to poor tolerance of treatment. Enzalutamide started in May 2024 and very poorly tolerated

## 2024-08-15 NOTE — BEGINNING OF VISIT
[1] : 2) Feeling down, depressed, or hopeless for several days (1) [PHQ-2 Negative] : PHQ-2 Negative [[id number]] : 2 [Pain Scale: ___] : On a scale of 1-10, today the patient's pain is a(n) [unfilled]. [Future Reassessment of Pain Scale] : Future reassessment of pain scale [Medication(s)] : Medication(s) [5-9] : 5-9 [> 15 Years] : > 15 Years [Date Discussed (MM/DD/YY): ___] : Discussed: [unfilled] [Reviewed updated] : Reviewed updated

## 2024-08-15 NOTE — BEGINNING OF VISIT
[1] : 2) Feeling down, depressed, or hopeless for several days (1) [PHQ-2 Negative] : PHQ-2 Negative [[id number]] : 2 [Pain Scale: ___] : On a scale of 1-10, today the patient's pain is a(n) [unfilled]. [Future Reassessment of Pain Scale] : Future reassessment of pain scale [Medication(s)] : Medication(s) [5-9] : 5-9 [> 15 Years] : > 15 Years [Date Discussed (MM/DD/YY): ___] : Discussed: [unfilled] [Reviewed updated] : Reviewed updated

## 2024-08-15 NOTE — PHYSICAL EXAM
[Fully active, able to carry on all pre-disease performance without restriction] : Status 0 - Fully active, able to carry on all pre-disease performance without restriction [Normal] : affect appropriate [de-identified] : no edema noted [de-identified] : no gynecomastia

## 2024-08-15 NOTE — REVIEW OF SYSTEMS
[Fatigue] : fatigue [Diarrhea: Grade 2 - Increase of 4 - 6 stools per day over baseline; moderate increase in ostomy output compared to baseline] : Diarrhea: Grade 2 - Increase of 4 - 6 stools per day over baseline; moderate increase in ostomy output compared to baseline [Incontinence] : incontinence [Joint Pain] : joint pain [Hot Flashes] : hot flashes [Negative] : ENT [Fever] : no fever [Chills] : no chills [Recent Change In Weight] : ~T no recent weight change [Chest Pain] : no chest pain [Palpitations] : no palpitations [Lower Ext Edema] : no lower extremity edema [Wheezing] : no wheezing [Cough] : no cough [SOB on Exertion] : no shortness of breath during exertion [Abdominal Pain] : no abdominal pain [Vomiting] : no vomiting [Constipation] : no constipation [Dysuria] : no dysuria [Joint Stiffness] : no joint stiffness [Muscle Pain] : no muscle pain [Skin Rash] : no skin rash [Dizziness] : no dizziness [Difficulty Walking] : no difficulty walking [Anxiety] : no anxiety [Depression] : no depression [Muscle Weakness] : no muscle weakness [Easy Bruising] : no tendency for easy bruising [FreeTextEntry9] : no cramps [de-identified] : no pruritus [de-identified] : Occ HA, some cognitive, no falls.

## 2024-08-15 NOTE — ASSESSMENT
[Palliative Care Plan] : not applicable at this time [Designated Health Care Proxy] : Designated Health Care Proxy [Name: ___] : Name: [unfilled] [Relationship: ___] : Relationship: [unfilled] [FreeTextEntry1] : Jaden Singleton was seen in the office today at the request of Dr. Pipe Landers.  He presented with his , with whom he has lived for more than 30 years.  He was diagnosed with prostate cancer in February 2018 with Nunn grade group 5 tumor.  His PSA was reportedly 5 at that time and he was found to have a hard prostate on examination.  He underwent a radical prostatectomy, revealing extensive perineural invasion with periprostatic spread in the right posterior region and in the soft tissue surrounding the seminal vesicles.  Both seminal vesicles were involved by tumor.  Tumor was focally present at the soft tissue margin of both seminal vesicles and was present within a few cell layers of the bladder margin base.  Lymph nodes removed were negative.  His postoperative PSA was 1.57, and he was started on Lupron at that time and he received external beam radiation therapy in May 2018.  Due to adverse events, he decided to hold off on Lupron in December 2018 and then was resumed again in November 19, 2021 when his PSA was increased to 1.12.  The first PSA in our laboratory system was 0.05 in May 2018 after starting ADT.  Subsequently, the PSAs were undetectable from August 2018 through March 2019 and then the PSA began to rise.  In February 2020 it was up to 2.09.  It then became undetectable once again from May 2020 through February 2021 and it anup to 1.12 in November 2021 and was undetectable again from May 2022 through August 2023.  In May 2024 the PSA was 0.15 and he was started on enzalutamide subsequently.  His PSA was undetectable on July 3.  A PSMA PET scan done in May 2024 revealed some evidence of distant metastasis, although the patient did not clearly understand this.  After starting on enzalutamide in May 2024, he had significant complaints.  He was recently hospitalized in July for an episode of an elevated blood pressure, reportedly at 255/180.  He had headaches at that time.  He complains of inability to sleep at night and his blood pressure has remained notably elevated.  At the last visit he complained that he felt unwell on enzalutamide and it was discontinued.  The onset of the symptoms correlated with the start of enzalutamide, and now after 1 month off of treatment, the symptoms have improved.  A comprehensive history was obtained, and a physical examination was performed.  The review of systems revealed some degree of fatigue.  There was no recent weight loss.  He does have some joint pains of chronic nature.  There were no muscle cramps.  He does have some cognitive issues over the course of the past 3 years.  There is an occasional headache.  He has not fallen down.  He denies any anxiety or depression.  He does have some hot flushes.  There is no easy bruising.  On physical examination, he appears well.  His performance status is 0.  His blood pressure was 123/80 and his weight was 69.9 kg, stable.  The oxygen saturation was 96%.  The physical examination was unremarkable.  There was no palpable adenopathy, and there was no tenderness to percussion or palpation over the spinal column or other bones.  Today's chemistry panel revealed normal findings with the exception of a glucose of 136 and a total bilirubin of 1.3.  The alkaline phosphatase was 121, and are upper it was normal on the last determination, but was elevated to 135 and 126 in August 2023, and in May 2024.  Today's PSA was once again undetectable.  The white blood cell count was 13.1 with a hemoglobin value of 15.9.  The platelet count was 219,000.  Previously his white blood cell count has been normal.  There was neutrophil predominance in the differential.  There is no evidence of infection at this time.  We reviewed his PSMA PET scan from May 20.  His PSA was only 0.06 at the time the PET scan was performed.  There was a small left thoracic inlet node measuring 8 mm in size with an SUV of 6.  There were no lung nodules.  The liver was normal.  There was a suspected left retrocrural node with an SUV of 10.1.  A right para-aortic node versus a ganglion on showed an SUV of 6.3 and a suspected left retroperitoneal lymph node had an SUV of 4.1.  There was a focus of intensely increased PSMA expression seen within the skeleton consistent with metastatic disease involving the right glenoid, the right fifth rib, and the right medial clavicle.  We discussed the meaning of these findings.  I explained to them that he has stage IV disease and that his disease is incurable, but treatable and controllable for a long time in many cases.  His  was struck by this, as they did not realize that he had stage IV disease.  I explained that it is not like stage IV disease and other tumor types.  We contrasted other tumors to the behavior of prostate cancer as well as the treatments that are administered for prostate cancer.  These findings on imaging were new compared to his initial standard radiologic imaging in 2021.  The PET scan is more sensitive, and it is possible that some of these findings may have been present in 2021.  Given his high risk disease with Nunn 9 tumor, pathologic T3b status, R1 resection, it is reasonable to treat him with an androgen receptor inhibitor.  Enzalutamide was chosen, which she has had difficulty in tolerating.  Approximately 20% of patients have significant lethargy and fatigue associated with this medication, and I usually prefer the use of abiraterone as first treatment.  In addition, patients who progressed on abiraterone may have clinical benefit from enzalutamide subsequently.  And a crossover trial from the the [place] group, using a 30% PSA response, the response of enzalutamide after abiraterone was 36% whereas the response to abiraterone after enzalutamide was only 4%.  For this reason, I prefer the use of abiraterone before enzalutamide.  Since he does not have resistance to enzalutamide, it is likely he may benefit from abiraterone.  He had a rising PSA before the initiation of this recent treatment, but it does not appear that circulating tumor DNA was assessed for abnormalities.  Unfortunately it would be a waste of money at this point given an undetectable PSA.  A DEXA scan is needed for assessment for osteoporosis or osteopenia.  I explained that I would prefer that he be on abiraterone at this time.  I discussed the adverse effects that may occur with the medication including, but not limited to, fluid retention, elevated blood pressure, transaminase elevations, increased fatigue, and a mild increase in cardiology events when compared to androgen deprivation therapy alone.  A prescription was sent to his pharmacy for abiraterone and prednisone and he was given instructions as to how to take the medications.  He has an appointment in follow-up with Dr. Landers on September 11 and he will see him at that point.  He was informed that I am leaving the institution and I will be located in [place] at our cancer center there from mid October on.  All questions were answered to the best of my ability and to their apparent satisfaction.  I have sent an email to Dr. Landers and I asked him to contact me for discussion.

## 2024-08-15 NOTE — HISTORY OF PRESENT ILLNESS
[Disease: _____________________] : Disease: [unfilled] [T: ___] : T[unfilled] [N: ___] : N[unfilled] [M: ___] : M[unfilled] [AJCC Stage: ____] : AJCC Stage: [unfilled] [de-identified] : Jaden Singleton is seen today for second opinion, at the request of Dr. Pipe Landers.  He presented with his .   He was diagnosed with prostate cancer by biopsy in February 2018, with [place] grade group 5 tumor.  His PSA was reportedly 5 at that time and he was found to have a hard prostate.  He underwent a radical prostatectomy revealing extensive perineural invasion, periprostatic spread in the right posterior region and in the soft tissues surrounding the seminal vesicles.  Both seminal vesicles were involved by tumor.  Tumor was focally present at the soft tissue margin of both seminal vesicles and was present within a few cell layers of the bladder base margin.  Lymph nodes were negative.  Notes indicate that his postoperative PSA was 1.57 on April 10, 2018.  He was started on Lupron at that time and received external beam radiation therapy in May 2018.  Due to adverse events, he decided to hold off on Lupron in December 2018 and resumed treatment on November 19, 2021 when his PSA was 1.12.  The first PSA in our laboratory reveals a value of 0.05 in May 2018.  Subsequently, the PSAs were undetectable from August 2018 through March 2019.  The PSA began to rise, and it was 2.09 in February 2020.  It then declined to undetectable levels once again from May 2020 through February 2021.  The PSA anup to 1.12 in November 2021, and it was undetectable again from May 2022 through August 2023.  In May 2024 the PSA was 0.15 and was once again undetectable on July 3, 2024.  He was started on enzalutamide in May 2024.  He had a recent hospital visit in July 2024 for an episode of an elevated blood pressure, reportedly at 255/180.  He complains of inability to sleep at night, and his blood pressure remains notably elevated.  At the last visit he complained that he felt unwell on enzalutamide.  Dr. Landers felt that the onset of these symptoms correlates with the start of enzalutamide, and he asked the patient to discontinue the treatment at the last visit, one month ago.   He had his Lupron shot yesterday and he feels "very drained" today. He had lower back last night. Not at the site n injections. Had fevers and chills last night. Took temp, was 101.4. No temp this AM, took Tylenol x 2 last night. No dysuria, no hematuria. Flow is "good", nocturia x 5-6 at times. Has incontinence, no pads, no diaper. No libido, no erections, since the surgery. Back pain is intermittently present, but then says he has pain all the time. shoulders. 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 Some days he does not take oxycodone. Low back pain in intermittent as well. Aching quality: may awaken him, better if he sleeps on his sie, better with walking.  No cough, no BRIGHT, no chest pain/pressure. No palpitations. Appetite is good, no weight loss/gain. No N/V/C. Diarrhea "all the time", has seen GI doctor, 5-6 times a day, watery or loose, often after meals, no abd cramps, no blood. "GI doc said all was okay". Hot flushes occur about 3 days a week, with sweats. fatigue "all the time". No energy, occ naps, which helps. Occ headahces: 3-4 times a week, may last most of the day, takes Advil. No visual changes with the HA. No dizziness, no balance issues, no falls.  is concerned about cognitive issues, worsened by enzalutamide. Feels better after stopping Xtandi. He felt his "eyes were popping out" on Xtandi. Better since off Xtandi. Seen by neurologist for cognitive issues, forgetful. was started on donezepil, which  felt was helping.  [de-identified] : Aditya grade group 5 (5+4) [de-identified] : Initial PSA 5.0 [de-identified] : Intermittent therapy with ADT due to poor tolerance of treatment. Enzalutamide started in May 2024 and very poorly tolerated

## 2024-08-15 NOTE — RESULTS/DATA
[FreeTextEntry1] : Surgical Final Report --2018 Final Diagnosis  1. PROSTATE AND SEMINAL VESICLES (RADICAL PROSTATECTOMY, 47.6 G): - PROSTATIC ADENOCARCINOMA, PROGNOSTIC GRADE GROUP 5 (VANI SCORE 5+4 = 9). - TUMOR OCCUPIES ROUGHLY 40% OF GLANDULAR VOLUME. - EXTENSIVE PERINEURAL INVASION. - ESHA-PROSTATIC SPREAD PRESENT IN THE RIGHT POSTERIOR REGION AND IN SOFT TISSUE SURROUNDING SEMINAL VESICLES. - SEMINAL VESICLES BILATERALLY EXTENSIVELY INVOLVED BY TUMOR. - TUMOR IS FOCALLY PRESENT AT SOFT TISSUE MARGINS OF BOTH SEMINAL VESICLES. - TUMOR IS PRESENT WITHIN A FEW CELL LAYERS OF THE BLADDER BASE MARGIN - ALL OTHER MARGINS ARE FREE OF TUMOR. - Reactive urothelium with "cystitis glandularis."  2. Lymph nodes (right pelvic): - Two lymph nodes negative for malignancy (0/2).  3. Lymph nodes (left pelvic): - Two lymph nodes negative for malignancy (0/2).  JALEESA BRIDGES M.D. (Electronic Signature) Reported on: 03/09/18 *************************************************************************** Surgical Final Report  2018           Final Diagnosis           1. Prostate, right base medial, biopsy -Adenocarcinoma of the prostate, Prognostic Grade Group 5           ([place] score 4+5=9) involving 90% (8 mm in length) of 1 of 1 core(s).            2. Prostate, right base lateral, biopsy -Adenocarcinoma of the prostate, Prognostic Grade Group 5 ([place] score 4+5=9) involving 100% (9.5 mm in length) of 1 of 1 core(s). -Perineural invasion is identified.            3. Prostate, right mid medial, biopsy -Adenocarcinoma of the prostate, Prognostic Grade Group 5 (Vani score 4+5=9) involving 90% (14 mm in length) of 1 of 1 core(s). -Perineural invasion is identified.            4. Prostate, right mid lateral, biopsy -Adenocarcinoma of the prostate, Prognostic Grade Group 5 ([place] score 4+5=9) involving 100% (12.5 mm in length) of 1 of 1 core(s).            5. Prostate, right apex medial, biopsy -Adenocarcinoma of the prostate, Prognostic Grade Group 4 ([place] score 4+4=8) involving 80% (10.5 mm in length) of 1 of 1 core(s).            6. Prostate, right apex lateral, biopsy -Adenocarcinoma of the prostate, Prognostic Grade Group 4 (Vani score 4+4=8) involving 100% (14 mm in length) of 1 of 1 core(s).            7. Prostate, left base medial, biopsy -Adenocarcinoma of the prostate, Prognostic Grade Group 5 ([place] score 4+5=9) involving 80% (6 mm in length) of 1 of 1 core(s).            8. Prostate, left base lateral, biopsy -Adenocarcinoma of the prostate, Prognostic Grade Group 5 (Vani score 4+5=9) involving 75% (11 mm in length) of 1 of 1 core(s).            9. Prostate, left mid medial, biopsy -Adenocarcinoma of the prostate, Prognostic Grade Group 5 (Vani score 4+5=9) involving 75% (11.5 mm in length) of 1 of 1 core(s).             10. Prostate, left mid lateral, biopsy -Adenocarcinoma of the prostate, Prognostic Grade Group 5 ([place] score 4+5=9) involving 40% (3 mm in length) of 1 of 1 core(s).            11. Prostate, left apex medial, biopsy -Adenocarcinoma of the prostate, Prognostic Grade Group 4 ([place] score 4+4=8) involving 5% (0.5 mm in length) of 1 of 1 core(s).            12. Prostate, left apex lateral, biopsy -Adenocarcinoma of the prostate, Prognostic Grade Group 5 ([place] score 4+5=9) involving 25% (3 mm in length) of 1 of 1 core(s).            Comment:           Case reported to Tumor Registry.            Kassandra Rascon M.D., Chief of Genitourinary Pathology           (Electronic Signature)           Reported on: 02/01/18 ********************************************************** EXAM:  CT ABDOMEN AND PELVIS OC IC PROCEDURE DATE:  05/24/2021 INTERPRETATION:  CLINICAL INFORMATION: Prostate cancer COMPARISON: Abdominal CT dated 02/08/2018 CONTRAST/COMPLICATIONS: IV Contrast: Omnipaque 300  90 cc administered   10 cc discarded Oral Contrast: Omnipaque 300 Complications: None reported at time of study completion  PROCEDURE: CT of the Abdomen and Pelvis was performed. Sagittal and coronal reformats were performed.  FINDINGS: LOWER CHEST: Small hiatal hernia.  LIVER: Within normal limits. BILE DUCTS: Normal caliber. GALLBLADDER: Within normal limits. SPLEEN: Within normal limits. PANCREAS: Within normal limits. ADRENALS: Within normal limits. KIDNEYS/URETERS: The kidneys are normal in size without hydronephrosis. There is 1 cm simple appearing cyst in upper pole of the left kidney.  BLADDER: Within normal limits. REPRODUCTIVE ORGANS: Prior prostatectomy. The prostatectomy bed is unremarkable.  BOWEL: No bowel obstruction. Appendix is normal. PERITONEUM: No ascites. VESSELS: Atherosclerotic changes. RETROPERITONEUM/LYMPH NODES: No lymphadenopathy. ABDOMINAL WALL: Within normal limits. BONES: No sclerotic or lytic lesions are identified.  IMPRESSION: Prior prostatectomy. No CT evidence of local recurrence or metastatic disease. YI LEIVA M.D., ATTENDING RADIOLOGIST Phone #: [phone] This document has been electronically signed. May 26 2021 ****************************************************************** EXAM:  NM BONE IMG WHOLE BODY EXAM:  NM BONE SPECT CT SA SD PROCEDURE DATE:  05/24/2021 INTERPRETATION:  RADIOPHARMACEUTICAL: 21.3mCi Tc-99m HDP, I.V. CLINICAL STATEMENT: 66-year-old male with history of prostate cancer referred for follow-up bone scan. Patient had recent trauma to the head.  TECHNIQUE:  Anterior and posterior whole body images were obtained 2-3 hours following administration of radiotracer. SPECT/CT of the lumbosacral region was performed.. The CT protocol was optimized for SPECT attenuation correction and to provide anatomic detail for localization of SPECT abnormalities. The CT protocol was not designed to produce and cannot replace state-of- the-art diagnostic CT images with specific imaging protocols for different body parts and indications.  COMPARISON: Bone scan from 2/8/2018.  FINDINGS: There is new mild focal uptake in the mid frontal region of the skull likely secondary to posttraumatic change. There is new mild uptake in the left ischium with no definite abnormality on CT and may represent enthesopathy or degenerative disease. Again seen are unchanged degenerative changes in the spine and major joints. No abnormal focus of increased or decreased activity suggestive of osseous metastasis is identified. Both kidneys are visualized and are symmetric in appearance. IMPRESSION: Bone scan demonstrates: New posttraumatic changes in the mid frontal region of the skull. Degenerative changes in the spine and major joints. No scan evidence of osseous metastasis. JAMIL MARSHALL MD; Attending Nuclear Medicine This document has been electronically signed. May 24 2021 ************************************************************************ EXAM: [id number] - PETCT WB PSMA INIT  - ORDERED BY: EVELYNE SMITH PROCEDURE DATE:  05/20/2024 INTERPRETATION:  CLINICAL INFORMATION: Prostate cancer diagnosed in 2018, status post radiation therapy. Currently on ADT. Most recent PSA: 0.06 Vani score at diagnosis: 9  TREATMENT STRATEGY EVALUATION: Initial RADIOPHARMACEUTICAL: 10.05 mCi F-18 Piflufolastat (Pylarify), I.V. I.V. SITE: antecubital left UPTAKE PERIOD: 60 min SCANNER: [employer] [id number]  TECHNIQUE: Following intravenous injection of radiopharmaceutical and above uptake period, PET/CT was obtained from vertex of skull to below the knees. CT was performed during shallow respiration. CT protocol was optimized for PET attenuation correction and anatomic localization and was not designed to produce and cannot replace state-of-the-art diagnostic CT images with specific imaging protocols for different body parts and indications. Images were reconstructed and reviewed in axial, coronal, and sagittal views and three-dimensional MIP.  Standardized uptake values (SUV) are normalized to patient body weight and indicate the highest activity concentration (SUVmax) in a given site. All image numbers refer to axial image number. PET findings are scored using the Molecular Imaging PSMA Expression Score (Score): Score 0: Uptake < blood pool - No PSMA expression Score 1: Uptake > blood pool AND < liver - Low PSMA expression Score 2: Uptake > liver AND < parotid gland - Intermediate expression Score 3: Uptake > parotid gland - High expression (Kadi, et. al. Prostate Cancer Molecular Imaging Standardized Evaluation (PROMISE): Proposed miTNM Classification for the Interpretation of PSMA-Ligand PET/CT. J Nucl Med 2018; 59:469-478). COMPARISON:  No prior PSMA-PET/CT OTHER STUDIES USED FOR CORRELATION: Bone scan and CT abdomen/pelvis 5/24/2021 FINDINGS:  HEAD/NECK: Physiologic radiotracer activity in lacrimal and salivary glands. For reference, mean SUV in RIGHT parotid: 12.4. Carotid atherosclerosis is noted.  THORAX: Increased uptake is seen at multiple thoracic lymph nodes. Reference examples include:  Left thoracic inlet, ill-defined on low-dose CT (SUV 6.4, image 94) Small LEFT thoracic inlet node (0.8 cm, SUV 6.0, image 94) Anterior mediastinum (SUV 4.4, image 107)  LUNGS: No abnormal radiotracer activity. No lung nodule.  PLEURA/PERICARDIUM: No abnormal radiotracer activity. No pleural or pericardial effusion.  ESOPHAGUS/STOMACH: No abnormal activity.  HEPATOBILIARY/PANCREAS: Physiologic radiotracer activity. For reference, liver SUV mean is 4.4.  SPLEEN: Physiologic activity. Normal size.  ADRENAL GLANDS: No abnormal radiotracer activity. No nodule.  KIDNEYS/URINARY BLADDER: Physiologic excreted radiotracer activity.  REPRODUCTIVE ORGANS: Prostatectomy site is unremarkable.  ABDOMINOPELVIC LYMPH NODES/RETROPERITONEUM: Suspected LEFT retrocrural node (SUV 10.1, image 163), and RIGHT para-aortic versus a ganglion (SUV 6.3, image 133) is suspected LEFT retroperitoneal node (SUV 4.1, image 229).  BOWEL/PERITONEUM/MESENTERY: Physiologic radiotracer activity.  ABDOMINAL WALL: No abnormal radiotracer activity.  BONES/SOFT TISSUES: Foci of intensely increased PSMA expression seen within the skeleton consistent with metastatic disease. Examples include:  Right glenoid (SUV 64.5, image 87) Right fifth rib (SUV 26.3, image 133) Medial RIGHT clavicle (SUV 8.0, image 96)  VESSELS: Atherosclerotic calcification of nonaneurysmal abdominal aorta including visceral and/or runoff branches.  IMPRESSION: 1.  No abnormal uptake at prostatectomy site. 2.  Lymph nodes with increased PSMA expression above and below the diaphragm, with quantitatively low to intermediate PSMA expression. 3.  Osseous metastasis with quantitatively high PSMA expression. 4.  No evidence of visceral metastases. --- End of Report --- JAJA KUHN MD; Attending Radiologist This document has been electronically signed. May 21 2024 ****************************************************************

## 2024-08-15 NOTE — BEGINNING OF VISIT
[1] : 2) Feeling down, depressed, or hopeless for several days (1) [PHQ-2 Negative] : PHQ-2 Negative [[id number]] : 2 [Pain Scale: ___] : On a scale of 1-10, today the patient's pain is a(n) [unfilled]. [Future Reassessment of Pain Scale] : Future reassessment of pain scale [Medication(s)] : Medication(s) [5-9] : 5-9 [> 15 Years] : > 15 Years [Date Discussed (MM/DD/YY): ___] : Discussed: [unfilled] [Reviewed updated] : Reviewed updated

## 2024-08-15 NOTE — ASSESSMENT
[Palliative Care Plan] : not applicable at this time [Designated Health Care Proxy] : Designated Health Care Proxy [Name: ___] : Name: [unfilled] [Relationship: ___] : Relationship: [unfilled] [FreeTextEntry1] : Jaden Singleton was seen in the office today at the request of Dr. Pipe Landers.  He presented with his , with whom he has lived for more than 30 years.  He was diagnosed with prostate cancer in February 2018 with Bagley grade group 5 tumor.  His PSA was reportedly 5 at that time and he was found to have a hard prostate on examination.  He underwent a radical prostatectomy, revealing extensive perineural invasion with periprostatic spread in the right posterior region and in the soft tissue surrounding the seminal vesicles.  Both seminal vesicles were involved by tumor.  Tumor was focally present at the soft tissue margin of both seminal vesicles and was present within a few cell layers of the bladder margin base.  Lymph nodes removed were negative.  His postoperative PSA was 1.57, and he was started on Lupron at that time and he received external beam radiation therapy in May 2018.  Due to adverse events, he decided to hold off on Lupron in December 2018 and then was resumed again in November 19, 2021 when his PSA was increased to 1.12.  The first PSA in our laboratory system was 0.05 in May 2018 after starting ADT.  Subsequently, the PSAs were undetectable from August 2018 through March 2019 and then the PSA began to rise.  In February 2020 it was up to 2.09.  It then became undetectable once again from May 2020 through February 2021 and it anup to 1.12 in November 2021 and was undetectable again from May 2022 through August 2023.  In May 2024 the PSA was 0.15 and he was started on enzalutamide subsequently.  His PSA was undetectable on July 3.  A PSMA PET scan done in May 2024 revealed some evidence of distant metastasis, although the patient did not clearly understand this.  After starting on enzalutamide in May 2024, he had significant complaints.  He was recently hospitalized in July for an episode of an elevated blood pressure, reportedly at 255/180.  He had headaches at that time.  He complains of inability to sleep at night and his blood pressure has remained notably elevated.  At the last visit he complained that he felt unwell on enzalutamide and it was discontinued.  The onset of the symptoms correlated with the start of enzalutamide, and now after 1 month off of treatment, the symptoms have improved.  A comprehensive history was obtained, and a physical examination was performed.  The review of systems revealed some degree of fatigue.  There was no recent weight loss.  He does have some joint pains of chronic nature.  There were no muscle cramps.  He does have some cognitive issues over the course of the past 3 years.  There is an occasional headache.  He has not fallen down.  He denies any anxiety or depression.  He does have some hot flushes.  There is no easy bruising.  On physical examination, he appears well.  His performance status is 0.  His blood pressure was 123/80 and his weight was 69.9 kg, stable.  The oxygen saturation was 96%.  The physical examination was unremarkable.  There was no palpable adenopathy, and there was no tenderness to percussion or palpation over the spinal column or other bones.  Today's chemistry panel revealed normal findings with the exception of a glucose of 136 and a total bilirubin of 1.3.  The alkaline phosphatase was 121, and are upper it was normal on the last determination, but was elevated to 135 and 126 in August 2023, and in May 2024.  Today's PSA was once again undetectable.  The white blood cell count was 13.1 with a hemoglobin value of 15.9.  The platelet count was 219,000.  Previously his white blood cell count has been normal.  There was neutrophil predominance in the differential.  There is no evidence of infection at this time.  We reviewed his PSMA PET scan from May 20.  His PSA was only 0.06 at the time the PET scan was performed.  There was a small left thoracic inlet node measuring 8 mm in size with an SUV of 6.  There were no lung nodules.  The liver was normal.  There was a suspected left retrocrural node with an SUV of 10.1.  A right para-aortic node versus a ganglion on showed an SUV of 6.3 and a suspected left retroperitoneal lymph node had an SUV of 4.1.  There was a focus of intensely increased PSMA expression seen within the skeleton consistent with metastatic disease involving the right glenoid, the right fifth rib, and the right medial clavicle.  We discussed the meaning of these findings.  I explained to them that he has stage IV disease and that his disease is incurable, but treatable and controllable for a long time in many cases.  His  was struck by this, as they did not realize that he had stage IV disease.  I explained that it is not like stage IV disease and other tumor types.  We contrasted other tumors to the behavior of prostate cancer as well as the treatments that are administered for prostate cancer.  These findings on imaging were new compared to his initial standard radiologic imaging in 2021.  The PET scan is more sensitive, and it is possible that some of these findings may have been present in 2021.  Given his high risk disease with Bagley 9 tumor, pathologic T3b status, R1 resection, it is reasonable to treat him with an androgen receptor inhibitor.  Enzalutamide was chosen, which she has had difficulty in tolerating.  Approximately 20% of patients have significant lethargy and fatigue associated with this medication, and I usually prefer the use of abiraterone as first treatment.  In addition, patients who progressed on abiraterone may have clinical benefit from enzalutamide subsequently.  And a crossover trial from the the [place] [place] group, using a 30% PSA response, the response of enzalutamide after abiraterone was 36% whereas the response to abiraterone after enzalutamide was only 4%.  For this reason, I prefer the use of abiraterone before enzalutamide.  Since he does not have resistance to enzalutamide, it is likely he may benefit from abiraterone.  He had a rising PSA before the initiation of this recent treatment, but it does not appear that circulating tumor DNA was assessed for abnormalities.  Unfortunately it would be a waste of money at this point given an undetectable PSA.  A DEXA scan is needed for assessment for osteoporosis or osteopenia.  I explained that I would prefer that he be on abiraterone at this time.  I discussed the adverse effects that may occur with the medication including, but not limited to, fluid retention, elevated blood pressure, transaminase elevations, increased fatigue, and a mild increase in cardiology events when compared to androgen deprivation therapy alone.  A prescription was sent to his pharmacy for abiraterone and prednisone and he was given instructions as to how to take the medications.  He has an appointment in follow-up with Dr. Landers on September 11 and he will see him at that point.  He was informed that I am leaving the institution and I will be located in [place] at our cancer center there from mid October on.  All questions were answered to the best of my ability and to their apparent satisfaction.  I have sent an email to Dr. aLnders and I asked him to contact me for discussion.

## 2024-08-20 RX ORDER — PREDNISONE 5 MG/1
5 TABLET ORAL
Qty: 30 | Refills: 11 | Status: ACTIVE | COMMUNITY
Start: 2024-08-20 | End: 1900-01-01

## 2024-08-20 RX ORDER — ABIRATERONE ACETATE 250 MG/1
250 TABLET, FILM COATED ORAL
Qty: 120 | Refills: 11 | Status: ACTIVE | COMMUNITY
Start: 2024-08-20 | End: 1900-01-01

## 2024-08-21 ENCOUNTER — APPOINTMENT (OUTPATIENT)
Dept: PAIN MANAGEMENT | Facility: CLINIC | Age: 69
End: 2024-08-21
Payer: OTHER MISCELLANEOUS

## 2024-08-21 DIAGNOSIS — G90.511 COMPLEX REGIONAL PAIN SYNDROME I OF RIGHT UPPER LIMB: ICD-10-CM

## 2024-08-21 PROCEDURE — 99212 OFFICE O/P EST SF 10 MIN: CPT | Mod: ACP

## 2024-08-21 NOTE — REASON FOR VISIT
[Home] : at home, [unfilled] , at the time of the visit. [Medical Office: ([facility])___] : at the medical office located in  [Patient] : the patient [Self] : self [Follow-Up Visit] : a follow-up pain management visit

## 2024-08-21 NOTE — HISTORY OF PRESENT ILLNESS
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

## 2024-08-21 NOTE — DISCUSSION/SUMMARY
[Medication Risks Reviewed] : Medication risks reviewed [de-identified] : Prescriptions renewed. Opioid [place]obtained on chart [place] and [place]. SOAPP-R completed on chart. The patient's medications are documented to the best of their ability. Quality of life and functional ability improved on medications. The patient is showing no aberrant behavior or evidence of diversion. The patient was advised not to use narcotic medication while operating an automobile or heavy machinery due to potential sedation or dizziness. The patient was educated to the risks associated with potential opioid dependence and addiction. Urine toxicology screens as per office protocol. Use of multimodal analgesia used prn. Follow up one month.

## 2024-09-05 ENCOUNTER — APPOINTMENT (OUTPATIENT)
Dept: HEMATOLOGY ONCOLOGY | Facility: CLINIC | Age: 69
End: 2024-09-05

## 2024-09-05 NOTE — DISCUSSION/SUMMARY
[FreeTextEntry1] : REASON FOR CONSULT Jaden Singleton is a 69-year-old female who was referred by Dr. Avery Cerrato for cancer genetic counseling and risk assessment due to his personal and family history of cancer. He was accompanied by his partner, Kath and Kath's granddaughter, Candy.  RELEVANT MEDICAL HISTORY Mr. Singleton was diagnosed with prostate cancer in 2/2018 at age 62 via prostate biopsy. He is s/p radical prostatectomy in 3/2018. Pathology showed prostatic adenocarcinoma, prognostic grade group 5 (Aditya score 5+4=9). He started Lupron in 4/2018 and received external beam radiation therapy in 5/2018. He was started on enzalutamide in May 2024 which was discontinued in 7/2024 as this was poorly tolerated. ADT with Lupron has been intermittent due to poor tolerance, but he does continue on this.  He also reportedly underwent Mohs for a basal cell carcinoma identified on the R-foot earlier this year and had a squamous cell carcinoma on his scalp a few years ago which was removed.  He also has a family history of cancer, see below.  OTHER MEDICAL AND SURGICAL HISTORY: Medical: R-hand/arm nerve damage Surgical: Shoulder Surgery, Back Surgery, Elbow Surgery  CANCER SCREENING HISTORY:   Colon: -	Colonoscopy: 5 years ago; Results: reportedly no polyps; Frequency: every 5 years per Mr. Singleton -	Total Polyps: 0 per Mr. iSngleton Skin:   -	FBSE: 2024; Frequency: every 6 months per Mr. Singleton -	Lesions biopsied/removed; Yes, reportedly 1 BCC removed from foot this year and 1 squamous cell carcinoma removed from scalp a few years ago Prostate:  -	PSA: 8/2024; Results: <0.01 Other: -	Endoscopy: none -	5/21/2024 PET/CT: 1. No abnormal uptake at prostatectomy site. 2. Lymph nodes with increased PSMA expression above and below the diaphragm, with quantitatively low to intermediate PSMA expression. 3. Osseous metastasis with quantitatively high PSMA expression. 4. No evidence of visceral metastases.  SOCIAL HISTORY: -	Partnered -	Tobacco-product use: Yes, 1 year total, 1 pack every 2 weeks. quit 40 years ago. -	Environmental exposure: Yes, he worked for 19years with [employer] with ink-related chemical exposure.  FAMILY HISTORY: Maternal ancestry was reported as Nepali and paternal ancestry was reported as [place]. A detailed family history of cancer was ascertained. Relevant diagnoses are detailed below and in the scanned pedigree.   To Mr. Singleton's knowledge, no one in the family has had germline testing for cancer susceptibility.    	RISK ASSESSMENT: Mr. Singleton's personal history of high-risk Aditya 9 prostate cancer with recent PETCT from 2024 showing osseous metastasis and family history of two brothers with prostate cancer and his sister with breast cancer is suggestive of an inherited predisposition to prostate and/or breast cancers and related cancers. He meets Medicare and NCCN criterias for genetic testing. We recommended genetic testing for a Custom[employer] panel of genes associated with breast and prostate cancers offered by [facility]. This test analyzes 20 genes: MARC, BARD1, BRCA1, BRCA2, CDH1, CHEK2, EPCAM, HOXB13, MLH1, MSH2, MSH6, NBN, NF1, PALB2, PMS2, PTEN, RAD51C, RAD51D, STK11, TP53.  We discussed the risks, benefits and limitations, and implications of genetic testing. We also discussed the psychosocial implications of genetic testing. Possible test results were reviewed with Mr. Singleton, along with associated medical management options.   Mr. Singleton consented to the above-mentioned genetic testing panel. Blood was drawn in our laboratory and sent to [facility] today.  PLAN:  1.	Blood drawn today will be sent to [facility] for analysis.  2.	We will contact Mr. Singleton once the results are available and will schedule a follow-up appointment, as needed. Results generally return in 2-3 weeks from the day the sample is received in the lab.   For any additional questions please call [facility] at [phone].    Lyubov Figueroa MS, [facility] Genetic Counselor, [facility]   CC:  Patient Dr. Avery Cerrato

## 2024-09-11 ENCOUNTER — APPOINTMENT (OUTPATIENT)
Dept: HEMATOLOGY ONCOLOGY | Facility: CLINIC | Age: 69
End: 2024-09-11
Payer: MEDICARE

## 2024-09-11 VITALS
HEIGHT: 65 IN | WEIGHT: 155 LBS | BODY MASS INDEX: 25.83 KG/M2 | TEMPERATURE: 97.1 F | HEART RATE: 50 BPM | DIASTOLIC BLOOD PRESSURE: 93 MMHG | OXYGEN SATURATION: 95 % | SYSTOLIC BLOOD PRESSURE: 181 MMHG

## 2024-09-11 DIAGNOSIS — C61 MALIGNANT NEOPLASM OF PROSTATE: ICD-10-CM

## 2024-09-11 DIAGNOSIS — Z79.818 LONG TERM (CURRENT) USE OF OTHER AGENTS AFFECTING ESTROGEN RECEPTORS AND ESTROGEN LEVELS: ICD-10-CM

## 2024-09-11 PROCEDURE — 99214 OFFICE O/P EST MOD 30 MIN: CPT

## 2024-09-11 RX ORDER — TRAZODONE HYDROCHLORIDE 50 MG/1
50 TABLET ORAL
Qty: 30 | Refills: 3 | Status: ACTIVE | COMMUNITY
Start: 2024-09-11 | End: 1900-01-01

## 2024-09-13 NOTE — HISTORY OF PRESENT ILLNESS
[de-identified] : Mr. Singleton is a 69 year old male following up for prostate cancer. He saw Dr. García for PSA elevation from 5 in 2017 to 9 in March 2018. 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 He is s/p robotic-assisted radical prostatectomy with Dr. Mckenna at [facility] on 3/14/18, with pathology identical to original: [place] 9 prostate adenCa with extensive perineural invasion, sravani-prostatic spread in soft tissue surrounding seminal vesicles, bilateral extensive seminal vesicle involvement and tumor within a few cell layers of bladder base. Soft tissue margins of both seminal vesicles were positive, 4 pelvic nodes were negative. Post-op PSA on 4/10/18 was 1.57. Started on Lupron in April 2018 and treated with external beam radiation in May 2018. Due to intolerable side effects (fatigue, muscle weakness, sexual dysfunction) Jaden decided to hold Lupron in December 2018, at which point his PSA was <0.01. He is now treated with intermittent androgen deprivation, after PSA anup to 2.0 . Last Lupron injection was 7.5 mg on October 9 2020. . PSA had been less than 0.01, and were able to hold Lupron until the PSA anup to greater than 1.    Patient resumed Lupron on 11/19/21 due to rise of PSA to 1.12     . By February 18, 2022 PSA was back down to less than 0.01. A 3-month dose of Lupron was restarted on 8/17/22 The PSA had remained less than 0.01 until 1/15/24 (0.06, and on 5/1/5/24 up again to .06.  [de-identified] : Returns for follow up visit. Next Lupron injection due in November. Started Abiraterone.  Today he feels much better on abiraterone compared to enzalutamide.  He complains of an abscess/cyst to his left forearm and a new bump to the back of his head.  Jaden continues to suffer from insomnia.  Post Lupron injection, he experienced a fever of 103.  PSA < 0.01 on 8/15/24

## 2024-09-13 NOTE — ADDENDUM
[FreeTextEntry1] : Documented by Otf Chopra acting as scribe for Dr. Landers on  09/11/2024.   All Medical record entries made by the Scribe were at my, Dr. Landers's, direction and personally dictated by me on  09/11/2024. I have reviewed the chart and agree that the record accurately reflects my personal performance of the history, physical exam, assessment and plan. I have also personally directed, reviewed, and agreed with the discharge instructions.

## 2024-09-13 NOTE — HISTORY OF PRESENT ILLNESS
[de-identified] : Mr. Singleton is a 69 year old male following up for prostate cancer. He saw Dr. García for PSA elevation from 5 in 2017 to 9 in March 2018. 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 He is s/p robotic-assisted radical prostatectomy with Dr. Mckenna at [facility] on 3/14/18, with pathology identical to original: [place] 9 prostate adenCa with extensive perineural invasion, sravani-prostatic spread in soft tissue surrounding seminal vesicles, bilateral extensive seminal vesicle involvement and tumor within a few cell layers of bladder base. Soft tissue margins of both seminal vesicles were positive, 4 pelvic nodes were negative. Post-op PSA on 4/10/18 was 1.57. Started on Lupron in April 2018 and treated with external beam radiation in May 2018. Due to intolerable side effects (fatigue, muscle weakness, sexual dysfunction) Jaden decided to hold Lupron in December 2018, at which point his PSA was <0.01. He is now treated with intermittent androgen deprivation, after PSA anup to 2.0 . Last Lupron injection was 7.5 mg on October 9 2020. . PSA had been less than 0.01, and were able to hold Lupron until the PSA anup to greater than 1.    Patient resumed Lupron on 11/19/21 due to rise of PSA to 1.12     . By February 18, 2022 PSA was back down to less than 0.01. A 3-month dose of Lupron was restarted on 8/17/22 The PSA had remained less than 0.01 until 1/15/24 (0.06, and on 5/1/5/24 up again to .06.  [de-identified] : Returns for follow up visit. Next Lupron injection due in November. Started Abiraterone.  Today he feels much better on abiraterone compared to enzalutamide.  He complains of an abscess/cyst to his left forearm and a new bump to the back of his head.  Jaden continues to suffer from insomnia.  Post Lupron injection, he experienced a fever of 103.  PSA < 0.01 on 8/15/24

## 2024-09-13 NOTE — ASSESSMENT
[FreeTextEntry1] : Prostate cancer, with recurrence, now treating with intermittent androgen deprivation - restarted Lupron 2/20, through 10/20, now continuing at patient request.   PSA <0.01 on 2/15/23 and 8/5/23  1/15/24 PSA 0.06, now 0.15 (5/15/24) 7/3/24 PSA < 0.01. 8/15/24 SMILEY < 0.01   - Patient resumed Lupron on 11/19/21,2/22,5/23/22, 8/1722, 2/15/23, 5/17/23, - continue q 3 month - Next Lupron due 11/13/24   - Increased urinary frequency and diarrhea - PSMA PET shows high PSMA expression in bone mets, low expression in adenopathy above and below diaphragm   5/21/24 PET/CT PSMA: IMPRESSION: 1.  No abnormal uptake at prostatectomy site. 2.  Lymph nodes with increased PSMA expression above and below the diaphragm, with quantitatively low to intermediate PSMA expression. 3.  Osseous metastasis with quantitatively high PSMA expression. 4.  No evidence of visceral metastases.  - Repeat PSA levels anup to 0.15 as of 5/15/24.  - Enzalutamide started in May 2024. PSA has returned to <0.01.  -Patient felt poorly on enzalutamide, complaining of insomnia, fatigue, and his hypertension exacerbation appeared to be temporally related to initiation of enzalutamide. -Patient has discontinued Xtandi. Now has started Abiraterone, tolerating much better. -Will prescribe Trazadone to help with insomnia.  - RTO in 1 month

## 2024-09-16 ENCOUNTER — RESULT REVIEW (OUTPATIENT)
Age: 69
End: 2024-09-16

## 2024-09-16 ENCOUNTER — APPOINTMENT (OUTPATIENT)
Dept: HEMATOLOGY ONCOLOGY | Facility: CLINIC | Age: 69
End: 2024-09-16

## 2024-09-16 LAB
BASOPHILS # BLD AUTO: 0 K/UL — SIGNIFICANT CHANGE UP (ref 0–0.2)
BASOPHILS NFR BLD AUTO: 0.6 % — SIGNIFICANT CHANGE UP (ref 0–2)
EOSINOPHIL # BLD AUTO: 0.1 K/UL — SIGNIFICANT CHANGE UP (ref 0–0.5)
EOSINOPHIL NFR BLD AUTO: 1.2 % — SIGNIFICANT CHANGE UP (ref 0–6)
HCT VFR BLD CALC: 41.7 % — SIGNIFICANT CHANGE UP (ref 39–50)
HGB BLD-MCNC: 14.5 G/DL — SIGNIFICANT CHANGE UP (ref 13–17)
LYMPHOCYTES # BLD AUTO: 1.3 K/UL — SIGNIFICANT CHANGE UP (ref 1–3.3)
LYMPHOCYTES # BLD AUTO: 14.7 % — SIGNIFICANT CHANGE UP (ref 13–44)
MCHC RBC-ENTMCNC: 31.3 PG — SIGNIFICANT CHANGE UP (ref 27–34)
MCHC RBC-ENTMCNC: 34.7 G/DL — SIGNIFICANT CHANGE UP (ref 32–36)
MCV RBC AUTO: 90.3 FL — SIGNIFICANT CHANGE UP (ref 80–100)
MONOCYTES # BLD AUTO: 0.5 K/UL — SIGNIFICANT CHANGE UP (ref 0–0.9)
MONOCYTES NFR BLD AUTO: 6.1 % — SIGNIFICANT CHANGE UP (ref 2–14)
NEUTROPHILS # BLD AUTO: 6.6 K/UL — SIGNIFICANT CHANGE UP (ref 1.8–7.4)
NEUTROPHILS NFR BLD AUTO: 77.5 % — HIGH (ref 43–77)
PLATELET # BLD AUTO: 228 K/UL — SIGNIFICANT CHANGE UP (ref 150–400)
RBC # BLD: 4.62 M/UL — SIGNIFICANT CHANGE UP (ref 4.2–5.8)
RBC # FLD: 11.3 % — SIGNIFICANT CHANGE UP (ref 10.3–14.5)
WBC # BLD: 8.6 K/UL — SIGNIFICANT CHANGE UP (ref 3.8–10.5)
WBC # FLD AUTO: 8.6 K/UL — SIGNIFICANT CHANGE UP (ref 3.8–10.5)

## 2024-09-17 LAB
ALBUMIN SERPL ELPH-MCNC: 4.4 G/DL
ALP BLD-CCNC: 144 U/L
ALT SERPL-CCNC: 13 U/L
ANION GAP SERPL CALC-SCNC: 12 MMOL/L
AST SERPL-CCNC: 14 U/L
BILIRUB SERPL-MCNC: 1.7 MG/DL
BUN SERPL-MCNC: 13 MG/DL
CALCIUM SERPL-MCNC: 8.9 MG/DL
CHLORIDE SERPL-SCNC: 104 MMOL/L
CO2 SERPL-SCNC: 24 MMOL/L
CREAT SERPL-MCNC: 0.78 MG/DL
EGFR: 96 ML/MIN/1.73M2
GLUCOSE SERPL-MCNC: 137 MG/DL
POTASSIUM SERPL-SCNC: 3.8 MMOL/L
PROT SERPL-MCNC: 6.4 G/DL
PSA SERPL-MCNC: <0.01 NG/ML
SODIUM SERPL-SCNC: 140 MMOL/L

## 2024-09-20 ENCOUNTER — APPOINTMENT (OUTPATIENT)
Dept: PAIN MANAGEMENT | Facility: CLINIC | Age: 69
End: 2024-09-20
Payer: OTHER MISCELLANEOUS

## 2024-09-20 DIAGNOSIS — G90.511 COMPLEX REGIONAL PAIN SYNDROME I OF RIGHT UPPER LIMB: ICD-10-CM

## 2024-09-20 PROCEDURE — 99212 OFFICE O/P EST SF 10 MIN: CPT | Mod: ACP

## 2024-09-20 NOTE — REASON FOR VISIT
[Home] : at home, [unfilled] , at the time of the visit. [Medical Office: ([facility])___] : at the medical office located in  [Patient] : the patient [Self] : self [Follow-Up Visit] : a follow-up pain management visit

## 2024-09-20 NOTE — HISTORY OF PRESENT ILLNESS
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

## 2024-09-20 NOTE — DISCUSSION/SUMMARY
[Medication Risks Reviewed] : Medication risks reviewed [de-identified] : Prescriptions renewed. Opioid [place]obtained on chart [place] and [place]. SOAPP-R completed on chart. The patient's medications are documented to the best of their ability. Quality of life and functional ability improved on medications. The patient is showing no aberrant behavior or evidence of diversion. The patient was advised not to use narcotic medication while operating an automobile or heavy machinery due to potential sedation or dizziness. The patient was educated to the risks associated with potential opioid dependence and addiction. Urine toxicology screens as per office protocol. Use of multimodal analgesia used prn. Follow up one month.

## 2024-09-25 ENCOUNTER — NON-APPOINTMENT (OUTPATIENT)
Age: 69
End: 2024-09-25

## 2024-09-25 NOTE — DISCUSSION/SUMMARY
[FreeTextEntry1] : RESULTS TRANSMISSION Jaden Singleton is a 69-year-old male who was called 9/25/2024 for a discussion regarding his genetic testing results related to hereditary cancer predisposition.   Mr. Singleton was originally seen at [facility] on 9/5/2024 for hereditary cancer predisposition risk assessment. Mr. Singleton was diagnosed with prostate cancer in 2/2018 at age 62 via prostate biopsy. He is s/p radical prostatectomy in 3/2018. Pathology showed prostatic adenocarcinoma, prognostic grade group 5 (Aditya score 5+4=9). He also has a family history of breast cancer. Mr. Singleton decided to pursue genetic testing using a [employer] panel of genes associated with breast and prostate cancers offered by [employer]  TEST RESULTS: NEGATIVE  No pathogenic (disease-causing) variants or VUSs were detected in the following 20 genes:  MARC, BARD1, BRCA1, BRCA2, CDH1, CHEK2, EPCAM, HOXB13, MLH1, MSH2, MSH6, NBN, NF1, PALB2, PMS2, PTEN, RAD51C, RAD51D, STK11, TP53  RESULTS INTERPRETATION AND ASSESSMENT: Given Mr. Singleton's personal and current reported family history of cancer, and his negative genetic test results, the following screening guidelines and risk-reducing recommendations were discussed:  PROSTATE: There may be a familial predisposition to prostate cancer given Mr. Singleton's own diagnosis and his two brothers' diagnoses. We therefore discussed that his nephews may consider pursuing prostate cancer risk assessment and prostate cancer screening at approximately age 40 due to the family history. Mr. Singleton's long-term management and surveillance related to his prostate cancer diagnosis should be based on Mr. Singleton's on-going treatment protocol as recommended by his oncology team.  OTHER: In the absence of other indications, Mr. Singleton should practice age-appropriate cancer screening of other organ systems as recommended for the general population.  We also discussed that, while no cause of the patient's personal and family history of cancer was identified, this result, while reassuring, does entirely not rule out a hereditary cancer risk in the patient. It is possible, although unlikely, the patient has a mutation in one of the genes tested that is not detectable by this analysis, or has a mutation in a different gene, either known or unknown. It is also possible there is a hereditary cancer predisposition in the family, but the patient did not inherit it.  We informed Mr. Singletno that our knowledge of genetics and inherited cancer conditions is changing rapidly. Therefore, we recommended that Mr. Singleton contact our office, every 2 to 3 years, to discuss relevant advances in cancer genetics.  We emphasized the importance of re-contacting us with updates regarding his personal and family history of cancer as well as any updates regarding additional cancer genetic test results performed for the patient and/or family members.  Such updates could possibly change our risk assessment and recommendations.   In addition, we discussed Mr. Signleton's affected siblings with breast and prostate cancers could consider pursuing cancer risk assessment genetic counseling with the option of genetic testing.   PLAN: 1.	See above for recommended screening and risk-reduction strategies. 2.	Patient informed consult note(s) will be available through their [employer] patient portal and genetic test results will be released via [facility] laboratory portal.  3.	Mr. Singleton was encouraged to contact us every 2-3 years to discuss relevant advances in cancer genetics, or sooner if there are any changes in his personal or family history of cancer.   For any additional questions please call Cancer Genetics at [phone].    Lyubov Figueroa MS, [facility] Genetic Counselor, [facility]   CC:  Patient Dr. Avery Cerrato

## 2024-10-18 ENCOUNTER — APPOINTMENT (OUTPATIENT)
Dept: PAIN MANAGEMENT | Facility: CLINIC | Age: 69
End: 2024-10-18
Payer: OTHER MISCELLANEOUS

## 2024-10-18 DIAGNOSIS — G90.511 COMPLEX REGIONAL PAIN SYNDROME I OF RIGHT UPPER LIMB: ICD-10-CM

## 2024-10-18 PROCEDURE — 99212 OFFICE O/P EST SF 10 MIN: CPT | Mod: ACP

## 2024-11-12 ENCOUNTER — APPOINTMENT (OUTPATIENT)
Dept: PAIN MANAGEMENT | Facility: CLINIC | Age: 69
End: 2024-11-12
Payer: OTHER MISCELLANEOUS

## 2024-11-12 VITALS — WEIGHT: 155 LBS | HEIGHT: 67 IN | BODY MASS INDEX: 24.33 KG/M2

## 2024-11-12 DIAGNOSIS — G90.511 COMPLEX REGIONAL PAIN SYNDROME I OF RIGHT UPPER LIMB: ICD-10-CM

## 2024-11-12 PROCEDURE — 99213 OFFICE O/P EST LOW 20 MIN: CPT | Mod: ACP
